# Patient Record
Sex: MALE | Race: WHITE | NOT HISPANIC OR LATINO | Employment: OTHER | URBAN - METROPOLITAN AREA
[De-identification: names, ages, dates, MRNs, and addresses within clinical notes are randomized per-mention and may not be internally consistent; named-entity substitution may affect disease eponyms.]

---

## 2017-01-19 ENCOUNTER — COMMUNICATION - HEALTHEAST (OUTPATIENT)
Dept: SCHEDULING | Facility: CLINIC | Age: 77
End: 2017-01-19

## 2017-01-19 DIAGNOSIS — I10 HYPERTENSION: ICD-10-CM

## 2017-02-28 ENCOUNTER — OFFICE VISIT - HEALTHEAST (OUTPATIENT)
Dept: VASCULAR SURGERY | Facility: CLINIC | Age: 77
End: 2017-02-28

## 2017-02-28 DIAGNOSIS — Z72.0 TOBACCO ABUSE: ICD-10-CM

## 2017-02-28 DIAGNOSIS — I10 UNSPECIFIED ESSENTIAL HYPERTENSION: ICD-10-CM

## 2017-02-28 DIAGNOSIS — I73.9 PAD (PERIPHERAL ARTERY DISEASE) (H): ICD-10-CM

## 2017-02-28 DIAGNOSIS — I65.23 CAROTID STENOSIS, BILATERAL: ICD-10-CM

## 2017-02-28 DIAGNOSIS — E78.5 HYPERLIPIDEMIA: ICD-10-CM

## 2017-02-28 ASSESSMENT — MIFFLIN-ST. JEOR: SCORE: 1372.63

## 2017-04-06 ENCOUNTER — OFFICE VISIT - HEALTHEAST (OUTPATIENT)
Dept: INTERNAL MEDICINE | Facility: CLINIC | Age: 77
End: 2017-04-06

## 2017-04-06 DIAGNOSIS — N18.9 CHRONIC KIDNEY DISEASE, UNSPECIFIED: ICD-10-CM

## 2017-04-06 DIAGNOSIS — E87.1 HYPONATREMIA: ICD-10-CM

## 2017-04-06 DIAGNOSIS — R01.0 INNOCENT HEART MURMUR: ICD-10-CM

## 2017-04-06 DIAGNOSIS — I10 ESSENTIAL HYPERTENSION WITH GOAL BLOOD PRESSURE LESS THAN 140/90: ICD-10-CM

## 2017-04-06 ASSESSMENT — MIFFLIN-ST. JEOR: SCORE: 1408.92

## 2017-06-12 ENCOUNTER — COMMUNICATION - HEALTHEAST (OUTPATIENT)
Dept: INTERNAL MEDICINE | Facility: CLINIC | Age: 77
End: 2017-06-12

## 2017-06-12 DIAGNOSIS — I10 HYPERTENSION: ICD-10-CM

## 2017-06-27 ENCOUNTER — OFFICE VISIT - HEALTHEAST (OUTPATIENT)
Dept: INTERNAL MEDICINE | Facility: CLINIC | Age: 77
End: 2017-06-27

## 2017-06-27 DIAGNOSIS — L02.31 CUTANEOUS ABSCESS OF BUTTOCK: ICD-10-CM

## 2017-06-27 ASSESSMENT — MIFFLIN-ST. JEOR: SCORE: 1419.35

## 2017-07-03 ENCOUNTER — RECORDS - HEALTHEAST (OUTPATIENT)
Dept: ADMINISTRATIVE | Facility: OTHER | Age: 77
End: 2017-07-03

## 2017-07-24 ENCOUNTER — COMMUNICATION - HEALTHEAST (OUTPATIENT)
Dept: INTERNAL MEDICINE | Facility: CLINIC | Age: 77
End: 2017-07-24

## 2017-07-24 ENCOUNTER — OFFICE VISIT - HEALTHEAST (OUTPATIENT)
Dept: INTERNAL MEDICINE | Facility: CLINIC | Age: 77
End: 2017-07-24

## 2017-07-24 DIAGNOSIS — Z01.818 PREOP EXAM FOR INTERNAL MEDICINE: ICD-10-CM

## 2017-07-24 DIAGNOSIS — E87.1 HYPONATREMIA: ICD-10-CM

## 2017-07-24 DIAGNOSIS — I10 ESSENTIAL HYPERTENSION WITH GOAL BLOOD PRESSURE LESS THAN 140/90: ICD-10-CM

## 2017-07-24 DIAGNOSIS — L02.31 ABSCESS OF BUTTOCK: ICD-10-CM

## 2017-07-24 LAB
ATRIAL RATE - MUSE: 53 BPM
DIASTOLIC BLOOD PRESSURE - MUSE: NORMAL MMHG
INTERPRETATION ECG - MUSE: NORMAL
P AXIS - MUSE: 74 DEGREES
PR INTERVAL - MUSE: 250 MS
QRS DURATION - MUSE: 90 MS
QT - MUSE: 438 MS
QTC - MUSE: 410 MS
R AXIS - MUSE: 48 DEGREES
SYSTOLIC BLOOD PRESSURE - MUSE: NORMAL MMHG
T AXIS - MUSE: 84 DEGREES
VENTRICULAR RATE- MUSE: 53 BPM

## 2017-07-24 ASSESSMENT — MIFFLIN-ST. JEOR: SCORE: 1400.41

## 2017-07-27 ENCOUNTER — SURGERY - HEALTHEAST (OUTPATIENT)
Dept: SURGERY | Facility: CLINIC | Age: 77
End: 2017-07-27

## 2017-07-27 ENCOUNTER — ANESTHESIA - HEALTHEAST (OUTPATIENT)
Dept: SURGERY | Facility: CLINIC | Age: 77
End: 2017-07-27

## 2017-07-27 ASSESSMENT — MIFFLIN-ST. JEOR: SCORE: 1381.7

## 2017-08-20 ENCOUNTER — COMMUNICATION - HEALTHEAST (OUTPATIENT)
Dept: INTERNAL MEDICINE | Facility: CLINIC | Age: 77
End: 2017-08-20

## 2017-08-20 DIAGNOSIS — F32.A DEPRESSION: ICD-10-CM

## 2017-08-27 ENCOUNTER — OFFICE VISIT - HEALTHEAST (OUTPATIENT)
Dept: FAMILY MEDICINE | Facility: CLINIC | Age: 77
End: 2017-08-27

## 2017-08-27 ENCOUNTER — RECORDS - HEALTHEAST (OUTPATIENT)
Dept: GENERAL RADIOLOGY | Facility: CLINIC | Age: 77
End: 2017-08-27

## 2017-08-27 DIAGNOSIS — R06.02 SOB (SHORTNESS OF BREATH): ICD-10-CM

## 2017-08-27 DIAGNOSIS — R06.02 SHORTNESS OF BREATH: ICD-10-CM

## 2017-08-27 DIAGNOSIS — R05.9 COUGH: ICD-10-CM

## 2017-08-27 DIAGNOSIS — J20.9 ACUTE BRONCHITIS: ICD-10-CM

## 2017-09-05 ENCOUNTER — COMMUNICATION - HEALTHEAST (OUTPATIENT)
Dept: INTERNAL MEDICINE | Facility: CLINIC | Age: 77
End: 2017-09-05

## 2017-09-05 DIAGNOSIS — E78.5 HYPERLIPIDEMIA: ICD-10-CM

## 2017-09-09 ENCOUNTER — COMMUNICATION - HEALTHEAST (OUTPATIENT)
Dept: INTERNAL MEDICINE | Facility: CLINIC | Age: 77
End: 2017-09-09

## 2017-09-09 DIAGNOSIS — N40.0 BPH (BENIGN PROSTATIC HYPERPLASIA): ICD-10-CM

## 2017-09-11 ENCOUNTER — AMBULATORY - HEALTHEAST (OUTPATIENT)
Dept: INTERNAL MEDICINE | Facility: CLINIC | Age: 77
End: 2017-09-11

## 2017-09-12 ENCOUNTER — OFFICE VISIT - HEALTHEAST (OUTPATIENT)
Dept: INTERNAL MEDICINE | Facility: CLINIC | Age: 77
End: 2017-09-12

## 2017-09-12 ENCOUNTER — COMMUNICATION - HEALTHEAST (OUTPATIENT)
Dept: INTERNAL MEDICINE | Facility: CLINIC | Age: 77
End: 2017-09-12

## 2017-09-12 DIAGNOSIS — I10 ESSENTIAL HYPERTENSION WITH GOAL BLOOD PRESSURE LESS THAN 140/90: ICD-10-CM

## 2017-09-12 DIAGNOSIS — J40 BRONCHITIS: ICD-10-CM

## 2017-09-12 DIAGNOSIS — J31.0 RHINITIS: ICD-10-CM

## 2017-09-12 ASSESSMENT — MIFFLIN-ST. JEOR: SCORE: 1390.78

## 2017-10-14 ENCOUNTER — COMMUNICATION - HEALTHEAST (OUTPATIENT)
Dept: INTERNAL MEDICINE | Facility: CLINIC | Age: 77
End: 2017-10-14

## 2017-10-14 DIAGNOSIS — I10 HYPERTENSION: ICD-10-CM

## 2017-10-14 DIAGNOSIS — F32.A DEPRESSION: ICD-10-CM

## 2017-10-19 ENCOUNTER — COMMUNICATION - HEALTHEAST (OUTPATIENT)
Dept: INTERNAL MEDICINE | Facility: CLINIC | Age: 77
End: 2017-10-19

## 2017-10-19 DIAGNOSIS — I10 HYPERTENSION: ICD-10-CM

## 2017-10-25 ENCOUNTER — COMMUNICATION - HEALTHEAST (OUTPATIENT)
Dept: INTERNAL MEDICINE | Facility: CLINIC | Age: 77
End: 2017-10-25

## 2017-10-25 DIAGNOSIS — I10 ESSENTIAL HYPERTENSION: ICD-10-CM

## 2018-04-23 ENCOUNTER — COMMUNICATION - HEALTHEAST (OUTPATIENT)
Dept: INTERNAL MEDICINE | Facility: CLINIC | Age: 78
End: 2018-04-23

## 2018-04-23 DIAGNOSIS — I10 HYPERTENSION: ICD-10-CM

## 2018-04-24 ENCOUNTER — AMBULATORY - HEALTHEAST (OUTPATIENT)
Dept: INTERNAL MEDICINE | Facility: CLINIC | Age: 78
End: 2018-04-24

## 2018-04-24 ENCOUNTER — COMMUNICATION - HEALTHEAST (OUTPATIENT)
Dept: INTERNAL MEDICINE | Facility: CLINIC | Age: 78
End: 2018-04-24

## 2018-05-10 ENCOUNTER — OFFICE VISIT - HEALTHEAST (OUTPATIENT)
Dept: INTERNAL MEDICINE | Facility: CLINIC | Age: 78
End: 2018-05-10

## 2018-05-10 ENCOUNTER — COMMUNICATION - HEALTHEAST (OUTPATIENT)
Dept: INTERNAL MEDICINE | Facility: CLINIC | Age: 78
End: 2018-05-10

## 2018-05-10 DIAGNOSIS — E55.9 VITAMIN D DEFICIENCY: ICD-10-CM

## 2018-05-10 DIAGNOSIS — I73.9 PERIPHERAL VASCULAR DISEASE (H): ICD-10-CM

## 2018-05-10 DIAGNOSIS — N18.9 CHRONIC KIDNEY DISEASE, UNSPECIFIED CKD STAGE: ICD-10-CM

## 2018-05-10 DIAGNOSIS — K63.5 COLON POLYP: ICD-10-CM

## 2018-05-10 DIAGNOSIS — E78.49 OTHER HYPERLIPIDEMIA: ICD-10-CM

## 2018-05-10 DIAGNOSIS — E72.10 DISORDER OF SULFUR-BEARING AMINO ACID METABOLISM (H): ICD-10-CM

## 2018-05-10 DIAGNOSIS — I10 ESSENTIAL HYPERTENSION: ICD-10-CM

## 2018-05-10 DIAGNOSIS — J43.8 OTHER EMPHYSEMA (H): ICD-10-CM

## 2018-05-10 LAB
ALBUMIN SERPL-MCNC: 3.2 G/DL (ref 3.5–5)
ALP SERPL-CCNC: 71 U/L (ref 45–120)
ALT SERPL W P-5'-P-CCNC: <9 U/L (ref 0–45)
ANION GAP SERPL CALCULATED.3IONS-SCNC: 8 MMOL/L (ref 5–18)
AST SERPL W P-5'-P-CCNC: 11 U/L (ref 0–40)
BILIRUB DIRECT SERPL-MCNC: 0.1 MG/DL
BILIRUB SERPL-MCNC: 0.3 MG/DL (ref 0–1)
BUN SERPL-MCNC: 25 MG/DL (ref 8–28)
CALCIUM SERPL-MCNC: 9.3 MG/DL (ref 8.5–10.5)
CHLORIDE BLD-SCNC: 102 MMOL/L (ref 98–107)
CHOLEST SERPL-MCNC: 108 MG/DL
CO2 SERPL-SCNC: 25 MMOL/L (ref 22–31)
CREAT SERPL-MCNC: 1.48 MG/DL (ref 0.7–1.3)
ERYTHROCYTE [DISTWIDTH] IN BLOOD BY AUTOMATED COUNT: 12 % (ref 11–14.5)
FASTING STATUS PATIENT QL REPORTED: YES
FASTING STATUS PATIENT QL REPORTED: YES
GFR SERPL CREATININE-BSD FRML MDRD: 46 ML/MIN/1.73M2
GLUCOSE BLD-MCNC: 98 MG/DL (ref 70–125)
HCT VFR BLD AUTO: 37.5 % (ref 40–54)
HDLC SERPL-MCNC: 31 MG/DL
HGB BLD-MCNC: 12.8 G/DL (ref 14–18)
HOMOCYSTEINE PLASMA - HISTORICAL: 16 UMOL/L (ref 0–13)
LDLC SERPL CALC-MCNC: 63 MG/DL
MCH RBC QN AUTO: 30.1 PG (ref 27–34)
MCHC RBC AUTO-ENTMCNC: 34 G/DL (ref 32–36)
MCV RBC AUTO: 88 FL (ref 80–100)
PLATELET # BLD AUTO: 271 THOU/UL (ref 140–440)
PMV BLD AUTO: 7.3 FL (ref 7–10)
POTASSIUM BLD-SCNC: 5.5 MMOL/L (ref 3.5–5)
PROT SERPL-MCNC: 6.2 G/DL (ref 6–8)
RBC # BLD AUTO: 4.24 MILL/UL (ref 4.4–6.2)
SODIUM SERPL-SCNC: 135 MMOL/L (ref 136–145)
TRIGL SERPL-MCNC: 70 MG/DL
TSH SERPL DL<=0.005 MIU/L-ACNC: 1.27 UIU/ML (ref 0.3–5)
WBC: 7.2 THOU/UL (ref 4–11)

## 2018-05-11 ENCOUNTER — COMMUNICATION - HEALTHEAST (OUTPATIENT)
Dept: INTERNAL MEDICINE | Facility: CLINIC | Age: 78
End: 2018-05-11

## 2018-05-11 LAB — 25(OH)D3 SERPL-MCNC: 55.7 NG/ML (ref 30–80)

## 2018-05-13 ENCOUNTER — AMBULATORY - HEALTHEAST (OUTPATIENT)
Dept: INTERNAL MEDICINE | Facility: CLINIC | Age: 78
End: 2018-05-13

## 2018-05-15 ENCOUNTER — COMMUNICATION - HEALTHEAST (OUTPATIENT)
Dept: INTERNAL MEDICINE | Facility: CLINIC | Age: 78
End: 2018-05-15

## 2018-05-15 DIAGNOSIS — F32.A DEPRESSION: ICD-10-CM

## 2018-05-18 ENCOUNTER — COMMUNICATION - HEALTHEAST (OUTPATIENT)
Dept: INTERNAL MEDICINE | Facility: CLINIC | Age: 78
End: 2018-05-18

## 2018-05-18 DIAGNOSIS — J44.9 COPD (CHRONIC OBSTRUCTIVE PULMONARY DISEASE) (H): ICD-10-CM

## 2018-05-26 ENCOUNTER — COMMUNICATION - HEALTHEAST (OUTPATIENT)
Dept: INTERNAL MEDICINE | Facility: CLINIC | Age: 78
End: 2018-05-26

## 2018-06-05 ENCOUNTER — COMMUNICATION - HEALTHEAST (OUTPATIENT)
Dept: INTERNAL MEDICINE | Facility: CLINIC | Age: 78
End: 2018-06-05

## 2018-06-12 ENCOUNTER — COMMUNICATION - HEALTHEAST (OUTPATIENT)
Dept: INTERNAL MEDICINE | Facility: CLINIC | Age: 78
End: 2018-06-12

## 2018-06-12 DIAGNOSIS — N40.0 BPH (BENIGN PROSTATIC HYPERPLASIA): ICD-10-CM

## 2018-06-20 ENCOUNTER — COMMUNICATION - HEALTHEAST (OUTPATIENT)
Dept: INTERNAL MEDICINE | Facility: CLINIC | Age: 78
End: 2018-06-20

## 2018-06-20 DIAGNOSIS — I10 HYPERTENSION: ICD-10-CM

## 2018-06-25 ENCOUNTER — OFFICE VISIT - HEALTHEAST (OUTPATIENT)
Dept: INTERNAL MEDICINE | Facility: CLINIC | Age: 78
End: 2018-06-25

## 2018-06-25 DIAGNOSIS — I10 ESSENTIAL HYPERTENSION: ICD-10-CM

## 2018-06-25 DIAGNOSIS — F32.A DEPRESSION: ICD-10-CM

## 2018-10-30 ENCOUNTER — RECORDS - HEALTHEAST (OUTPATIENT)
Dept: ADMINISTRATIVE | Facility: OTHER | Age: 78
End: 2018-10-30

## 2018-10-31 ENCOUNTER — RECORDS - HEALTHEAST (OUTPATIENT)
Dept: ADMINISTRATIVE | Facility: OTHER | Age: 78
End: 2018-10-31

## 2018-10-31 ENCOUNTER — COMMUNICATION - HEALTHEAST (OUTPATIENT)
Dept: INTERNAL MEDICINE | Facility: CLINIC | Age: 78
End: 2018-10-31

## 2018-10-31 DIAGNOSIS — I10 HYPERTENSION: ICD-10-CM

## 2018-11-14 ENCOUNTER — COMMUNICATION - HEALTHEAST (OUTPATIENT)
Dept: INTERNAL MEDICINE | Facility: CLINIC | Age: 78
End: 2018-11-14

## 2018-11-14 DIAGNOSIS — I10 HYPERTENSION: ICD-10-CM

## 2018-12-09 ENCOUNTER — COMMUNICATION - HEALTHEAST (OUTPATIENT)
Dept: INTERNAL MEDICINE | Facility: CLINIC | Age: 78
End: 2018-12-09

## 2018-12-09 DIAGNOSIS — E78.5 HYPERLIPIDEMIA: ICD-10-CM

## 2019-02-13 ENCOUNTER — OFFICE VISIT - HEALTHEAST (OUTPATIENT)
Dept: INTERNAL MEDICINE | Facility: CLINIC | Age: 79
End: 2019-02-13

## 2019-02-13 DIAGNOSIS — J43.9 PULMONARY EMPHYSEMA, UNSPECIFIED EMPHYSEMA TYPE (H): ICD-10-CM

## 2019-02-13 DIAGNOSIS — E78.5 HYPERLIPIDEMIA: ICD-10-CM

## 2019-02-13 DIAGNOSIS — Z51.81 MEDICATION MONITORING ENCOUNTER: ICD-10-CM

## 2019-02-13 DIAGNOSIS — N40.0 BPH (BENIGN PROSTATIC HYPERPLASIA): ICD-10-CM

## 2019-02-13 DIAGNOSIS — Z87.891 HISTORY OF TOBACCO USE: ICD-10-CM

## 2019-02-13 DIAGNOSIS — Z12.5 SCREENING FOR PROSTATE CANCER: ICD-10-CM

## 2019-02-13 DIAGNOSIS — F32.A DEPRESSION: ICD-10-CM

## 2019-02-13 DIAGNOSIS — I73.9 PERIPHERAL VASCULAR DISEASE (H): ICD-10-CM

## 2019-02-13 DIAGNOSIS — N18.9 CHRONIC KIDNEY DISEASE, UNSPECIFIED CKD STAGE: ICD-10-CM

## 2019-02-13 DIAGNOSIS — I10 HYPERTENSION: ICD-10-CM

## 2019-02-13 DIAGNOSIS — Z86.0100 HISTORY OF COLONIC POLYPS: ICD-10-CM

## 2019-02-13 DIAGNOSIS — Z86.73 HISTORY OF STROKE: ICD-10-CM

## 2019-02-13 LAB
ALBUMIN SERPL-MCNC: 3.5 G/DL (ref 3.5–5)
ALP SERPL-CCNC: 75 U/L (ref 45–120)
ALT SERPL W P-5'-P-CCNC: <9 U/L (ref 0–45)
ANION GAP SERPL CALCULATED.3IONS-SCNC: 11 MMOL/L (ref 5–18)
AST SERPL W P-5'-P-CCNC: 15 U/L (ref 0–40)
BILIRUB SERPL-MCNC: 0.3 MG/DL (ref 0–1)
BUN SERPL-MCNC: 22 MG/DL (ref 8–28)
CALCIUM SERPL-MCNC: 9.2 MG/DL (ref 8.5–10.5)
CHLORIDE BLD-SCNC: 96 MMOL/L (ref 98–107)
CO2 SERPL-SCNC: 26 MMOL/L (ref 22–31)
CREAT SERPL-MCNC: 1.38 MG/DL (ref 0.7–1.3)
ERYTHROCYTE [DISTWIDTH] IN BLOOD BY AUTOMATED COUNT: 12.6 % (ref 11–14.5)
GFR SERPL CREATININE-BSD FRML MDRD: 50 ML/MIN/1.73M2
GLUCOSE BLD-MCNC: 102 MG/DL (ref 70–125)
HCT VFR BLD AUTO: 40.9 % (ref 40–54)
HGB BLD-MCNC: 13.6 G/DL (ref 14–18)
MCH RBC QN AUTO: 29.7 PG (ref 27–34)
MCHC RBC AUTO-ENTMCNC: 33.3 G/DL (ref 32–36)
MCV RBC AUTO: 89 FL (ref 80–100)
PLATELET # BLD AUTO: 286 THOU/UL (ref 140–440)
PMV BLD AUTO: 7.2 FL (ref 7–10)
POTASSIUM BLD-SCNC: 4.9 MMOL/L (ref 3.5–5)
PROT SERPL-MCNC: 6.3 G/DL (ref 6–8)
PSA SERPL-MCNC: 0.6 NG/ML (ref 0–6.5)
RBC # BLD AUTO: 4.58 MILL/UL (ref 4.4–6.2)
SODIUM SERPL-SCNC: 133 MMOL/L (ref 136–145)
WBC: 8.3 THOU/UL (ref 4–11)

## 2019-02-14 ENCOUNTER — COMMUNICATION - HEALTHEAST (OUTPATIENT)
Dept: INTERNAL MEDICINE | Facility: CLINIC | Age: 79
End: 2019-02-14

## 2019-02-14 ENCOUNTER — HOSPITAL ENCOUNTER (OUTPATIENT)
Dept: CT IMAGING | Facility: HOSPITAL | Age: 79
Discharge: HOME | End: 2019-02-14
Attending: INTERNAL MEDICINE

## 2019-02-14 DIAGNOSIS — Z87.891 HISTORY OF TOBACCO USE: ICD-10-CM

## 2019-02-18 ENCOUNTER — COMMUNICATION - HEALTHEAST (OUTPATIENT)
Dept: INTERNAL MEDICINE | Facility: CLINIC | Age: 79
End: 2019-02-18

## 2019-02-18 DIAGNOSIS — F32.A DEPRESSION: ICD-10-CM

## 2019-02-18 DIAGNOSIS — I10 HYPERTENSION: ICD-10-CM

## 2019-03-11 ENCOUNTER — COMMUNICATION - HEALTHEAST (OUTPATIENT)
Dept: INTERNAL MEDICINE | Facility: CLINIC | Age: 79
End: 2019-03-11

## 2019-03-11 DIAGNOSIS — N40.0 BPH (BENIGN PROSTATIC HYPERPLASIA): ICD-10-CM

## 2019-03-14 ENCOUNTER — COMMUNICATION - HEALTHEAST (OUTPATIENT)
Dept: SCHEDULING | Facility: CLINIC | Age: 79
End: 2019-03-14

## 2019-03-14 DIAGNOSIS — N40.0 BPH (BENIGN PROSTATIC HYPERPLASIA): ICD-10-CM

## 2019-05-15 ENCOUNTER — OFFICE VISIT - HEALTHEAST (OUTPATIENT)
Dept: INTERNAL MEDICINE | Facility: CLINIC | Age: 79
End: 2019-05-15

## 2019-05-15 DIAGNOSIS — I73.9 PERIPHERAL VASCULAR DISEASE (H): ICD-10-CM

## 2019-05-15 DIAGNOSIS — I65.29 STENOSIS OF CAROTID ARTERY, UNSPECIFIED LATERALITY: ICD-10-CM

## 2019-05-15 DIAGNOSIS — Z86.73 HISTORY OF STROKE: ICD-10-CM

## 2019-05-15 DIAGNOSIS — J43.9 PULMONARY EMPHYSEMA, UNSPECIFIED EMPHYSEMA TYPE (H): ICD-10-CM

## 2019-05-15 DIAGNOSIS — Z87.891 HISTORY OF TOBACCO USE: ICD-10-CM

## 2019-05-15 DIAGNOSIS — Z51.81 MEDICATION MONITORING ENCOUNTER: ICD-10-CM

## 2019-05-15 DIAGNOSIS — N40.0 BENIGN PROSTATIC HYPERPLASIA, UNSPECIFIED WHETHER LOWER URINARY TRACT SYMPTOMS PRESENT: ICD-10-CM

## 2019-05-15 DIAGNOSIS — I10 ESSENTIAL HYPERTENSION: ICD-10-CM

## 2019-05-15 LAB
ALBUMIN SERPL-MCNC: 3.1 G/DL (ref 3.5–5)
ALP SERPL-CCNC: 67 U/L (ref 45–120)
ALT SERPL W P-5'-P-CCNC: <9 U/L (ref 0–45)
ANION GAP SERPL CALCULATED.3IONS-SCNC: 9 MMOL/L (ref 5–18)
AST SERPL W P-5'-P-CCNC: 13 U/L (ref 0–40)
BILIRUB SERPL-MCNC: 0.2 MG/DL (ref 0–1)
BUN SERPL-MCNC: 22 MG/DL (ref 8–28)
CALCIUM SERPL-MCNC: 9.1 MG/DL (ref 8.5–10.5)
CHLORIDE BLD-SCNC: 97 MMOL/L (ref 98–107)
CO2 SERPL-SCNC: 25 MMOL/L (ref 22–31)
CREAT SERPL-MCNC: 1.39 MG/DL (ref 0.7–1.3)
GFR SERPL CREATININE-BSD FRML MDRD: 50 ML/MIN/1.73M2
GLUCOSE BLD-MCNC: 102 MG/DL (ref 70–125)
POTASSIUM BLD-SCNC: 5.1 MMOL/L (ref 3.5–5)
PROT SERPL-MCNC: 5.7 G/DL (ref 6–8)
SODIUM SERPL-SCNC: 131 MMOL/L (ref 136–145)

## 2019-05-16 ENCOUNTER — COMMUNICATION - HEALTHEAST (OUTPATIENT)
Dept: INTERNAL MEDICINE | Facility: CLINIC | Age: 79
End: 2019-05-16

## 2019-05-16 DIAGNOSIS — E87.1 HYPONATREMIA: ICD-10-CM

## 2019-05-19 ENCOUNTER — COMMUNICATION - HEALTHEAST (OUTPATIENT)
Dept: INTERNAL MEDICINE | Facility: CLINIC | Age: 79
End: 2019-05-19

## 2019-05-19 DIAGNOSIS — I10 HYPERTENSION: ICD-10-CM

## 2019-05-28 ENCOUNTER — AMBULATORY - HEALTHEAST (OUTPATIENT)
Dept: LAB | Facility: CLINIC | Age: 79
End: 2019-05-28

## 2019-05-28 DIAGNOSIS — E87.1 HYPONATREMIA: ICD-10-CM

## 2019-05-28 LAB
ANION GAP SERPL CALCULATED.3IONS-SCNC: 11 MMOL/L (ref 5–18)
BUN SERPL-MCNC: 20 MG/DL (ref 8–28)
CALCIUM SERPL-MCNC: 9.6 MG/DL (ref 8.5–10.5)
CHLORIDE BLD-SCNC: 97 MMOL/L (ref 98–107)
CO2 SERPL-SCNC: 24 MMOL/L (ref 22–31)
CREAT SERPL-MCNC: 1.38 MG/DL (ref 0.7–1.3)
GFR SERPL CREATININE-BSD FRML MDRD: 50 ML/MIN/1.73M2
GLUCOSE BLD-MCNC: 105 MG/DL (ref 70–125)
POTASSIUM BLD-SCNC: 5 MMOL/L (ref 3.5–5)
SODIUM SERPL-SCNC: 132 MMOL/L (ref 136–145)

## 2019-05-29 ENCOUNTER — COMMUNICATION - HEALTHEAST (OUTPATIENT)
Dept: INTERNAL MEDICINE | Facility: CLINIC | Age: 79
End: 2019-05-29

## 2019-07-10 ENCOUNTER — TRANSFERRED RECORDS (OUTPATIENT)
Dept: HEALTH INFORMATION MANAGEMENT | Facility: CLINIC | Age: 79
End: 2019-07-10

## 2019-07-12 ENCOUNTER — PRE VISIT (OUTPATIENT)
Dept: OPHTHALMOLOGY | Facility: CLINIC | Age: 79
End: 2019-07-12

## 2019-07-12 NOTE — TELEPHONE ENCOUNTER
FUTURE VISIT INFORMATION      FUTURE VISIT INFORMATION:    Date: 8/12/19    Time: 8:00am    Location: [facility]  REFERRAL INFORMATION:    Referring provider:   Dr. Mando Avila     Referring providers clinic:  [facility]    Reason for visit/diagnosis  Right eye enucleation    RECORDS REQUESTED FROM:       Clinic name Comments Records Status Imaging Status   [facility] Records received and sent to [facility]

## 2019-08-07 ENCOUNTER — OFFICE VISIT - HEALTHEAST (OUTPATIENT)
Dept: INTERNAL MEDICINE | Facility: CLINIC | Age: 79
End: 2019-08-07

## 2019-08-07 ENCOUNTER — HOSPITAL ENCOUNTER (OUTPATIENT)
Dept: ULTRASOUND IMAGING | Facility: CLINIC | Age: 79
Discharge: HOME | End: 2019-08-07
Attending: INTERNAL MEDICINE

## 2019-08-07 DIAGNOSIS — E78.00 HYPERCHOLESTEROLEMIA: ICD-10-CM

## 2019-08-07 DIAGNOSIS — H54.40 BLIND RIGHT EYE: ICD-10-CM

## 2019-08-07 DIAGNOSIS — J44.9 COPD (CHRONIC OBSTRUCTIVE PULMONARY DISEASE) (H): ICD-10-CM

## 2019-08-07 DIAGNOSIS — Z86.73 HISTORY OF STROKE: ICD-10-CM

## 2019-08-07 DIAGNOSIS — Z51.81 ENCOUNTER FOR THERAPEUTIC DRUG MONITORING: ICD-10-CM

## 2019-08-07 DIAGNOSIS — I73.9 PERIPHERAL VASCULAR DISEASE (H): ICD-10-CM

## 2019-08-07 DIAGNOSIS — I10 ESSENTIAL HYPERTENSION: ICD-10-CM

## 2019-08-07 DIAGNOSIS — N18.9 CHRONIC KIDNEY DISEASE, UNSPECIFIED CKD STAGE: ICD-10-CM

## 2019-08-07 DIAGNOSIS — I65.29 STENOSIS OF CAROTID ARTERY, UNSPECIFIED LATERALITY: ICD-10-CM

## 2019-08-07 DIAGNOSIS — Z86.0100 HISTORY OF COLONIC POLYPS: ICD-10-CM

## 2019-08-07 DIAGNOSIS — I35.0 AORTIC VALVE STENOSIS, ETIOLOGY OF CARDIAC VALVE DISEASE UNSPECIFIED: ICD-10-CM

## 2019-08-07 DIAGNOSIS — J43.9 PULMONARY EMPHYSEMA, UNSPECIFIED EMPHYSEMA TYPE (H): ICD-10-CM

## 2019-08-07 DIAGNOSIS — Z87.891 HISTORY OF TOBACCO USE: ICD-10-CM

## 2019-08-07 LAB
ALBUMIN SERPL-MCNC: 3.7 G/DL (ref 3.5–5)
ALP SERPL-CCNC: 69 U/L (ref 45–120)
ALT SERPL W P-5'-P-CCNC: 12 U/L (ref 0–45)
ANION GAP SERPL CALCULATED.3IONS-SCNC: 8 MMOL/L (ref 5–18)
AST SERPL W P-5'-P-CCNC: 16 U/L (ref 0–40)
BILIRUB SERPL-MCNC: 0.5 MG/DL (ref 0–1)
BUN SERPL-MCNC: 21 MG/DL (ref 8–28)
CALCIUM SERPL-MCNC: 9.4 MG/DL (ref 8.5–10.5)
CHLORIDE BLD-SCNC: 98 MMOL/L (ref 98–107)
CHOLEST SERPL-MCNC: 95 MG/DL
CK SERPL-CCNC: 120 U/L (ref 30–190)
CO2 SERPL-SCNC: 23 MMOL/L (ref 22–31)
CREAT SERPL-MCNC: 1.47 MG/DL (ref 0.7–1.3)
ERYTHROCYTE [DISTWIDTH] IN BLOOD BY AUTOMATED COUNT: 12.9 % (ref 11–14.5)
FASTING STATUS PATIENT QL REPORTED: YES
GFR SERPL CREATININE-BSD FRML MDRD: 47 ML/MIN/1.73M2
GLUCOSE BLD-MCNC: 92 MG/DL (ref 70–125)
HCT VFR BLD AUTO: 37.6 % (ref 40–54)
HDLC SERPL-MCNC: 33 MG/DL
HGB BLD-MCNC: 12.6 G/DL (ref 14–18)
LDLC SERPL CALC-MCNC: 46 MG/DL
MCH RBC QN AUTO: 30.1 PG (ref 27–34)
MCHC RBC AUTO-ENTMCNC: 33.6 G/DL (ref 32–36)
MCV RBC AUTO: 90 FL (ref 80–100)
PLATELET # BLD AUTO: 250 THOU/UL (ref 140–440)
PMV BLD AUTO: 8.1 FL (ref 7–10)
POTASSIUM BLD-SCNC: 5.7 MMOL/L (ref 3.5–5)
PROT SERPL-MCNC: 5.9 G/DL (ref 6–8)
RBC # BLD AUTO: 4.2 MILL/UL (ref 4.4–6.2)
SODIUM SERPL-SCNC: 129 MMOL/L (ref 136–145)
TRIGL SERPL-MCNC: 81 MG/DL
WBC: 9.2 THOU/UL (ref 4–11)

## 2019-08-08 ENCOUNTER — COMMUNICATION - HEALTHEAST (OUTPATIENT)
Dept: INTERNAL MEDICINE | Facility: CLINIC | Age: 79
End: 2019-08-08

## 2019-08-12 ENCOUNTER — ALLIED HEALTH/NURSE VISIT (OUTPATIENT)
Dept: OPHTHALMOLOGY | Facility: CLINIC | Age: 79
End: 2019-08-12
Attending: OPHTHALMOLOGY
Payer: COMMERCIAL

## 2019-08-12 ENCOUNTER — OFFICE VISIT (OUTPATIENT)
Dept: OPHTHALMOLOGY | Facility: CLINIC | Age: 79
End: 2019-08-12
Payer: COMMERCIAL

## 2019-08-12 VITALS — WEIGHT: 145 LBS | BODY MASS INDEX: 21.48 KG/M2 | HEIGHT: 69 IN

## 2019-08-12 DIAGNOSIS — H57.10 BLIND PAINFUL EYE: Primary | ICD-10-CM

## 2019-08-12 DIAGNOSIS — H54.40 BLIND PAINFUL EYE: Primary | ICD-10-CM

## 2019-08-12 DIAGNOSIS — H18.421 BAND KERATOPATHY OF RIGHT EYE: ICD-10-CM

## 2019-08-12 RX ORDER — CLOPIDOGREL BISULFATE 75 MG/1
TABLET ORAL
COMMUNITY
Start: 2019-07-10 | End: 2020-07-27

## 2019-08-12 RX ORDER — LOSARTAN POTASSIUM 100 MG/1
TABLET ORAL
COMMUNITY
Start: 2019-07-10 | End: 2021-01-01

## 2019-08-12 RX ORDER — SIMVASTATIN 40 MG
TABLET ORAL
COMMUNITY
Start: 2019-03-11 | End: 2021-01-01

## 2019-08-12 RX ORDER — NEOMYCIN SULFATE, POLYMYXIN B SULFATE AND DEXAMETHASONE 3.5; 10000; 1 MG/ML; [USP'U]/ML; MG/ML
SUSPENSION/ DROPS OPHTHALMIC
COMMUNITY
Start: 2019-07-25 | End: 2021-01-01

## 2019-08-12 RX ORDER — TAMSULOSIN HYDROCHLORIDE 0.4 MG/1
0.4 CAPSULE ORAL DAILY
COMMUNITY
Start: 2019-06-10

## 2019-08-12 RX ORDER — METOPROLOL SUCCINATE 100 MG/1
TABLET, EXTENDED RELEASE ORAL
COMMUNITY
Start: 2019-05-16 | End: 2021-01-01

## 2019-08-12 RX ORDER — MIRTAZAPINE 30 MG/1
30 TABLET, FILM COATED ORAL AT BEDTIME
COMMUNITY
Start: 2019-05-17

## 2019-08-12 RX ORDER — AMLODIPINE BESYLATE 5 MG/1
5 TABLET ORAL DAILY
COMMUNITY
Start: 2019-05-16 | End: 2021-01-01

## 2019-08-12 ASSESSMENT — CONF VISUAL FIELD
OD_SUPERIOR_TEMPORAL_RESTRICTION: 1
OD_INFERIOR_NASAL_RESTRICTION: 1
OD_SUPERIOR_NASAL_RESTRICTION: 1
OD_INFERIOR_TEMPORAL_RESTRICTION: 1
OS_NORMAL: 1
COMMENTS: CHECKED WITH HM

## 2019-08-12 ASSESSMENT — MIFFLIN-ST. JEOR: SCORE: 1370.16

## 2019-08-12 ASSESSMENT — EXTERNAL EXAM - RIGHT EYE: OD_EXAM: NORMAL

## 2019-08-12 ASSESSMENT — VISUAL ACUITY
OD_CC: NLP
OS_CC: 20/30
METHOD: SNELLEN - LINEAR
OS_CC+: -1

## 2019-08-12 ASSESSMENT — SLIT LAMP EXAM - LIDS
COMMENTS: DERMATOCHALASIS
COMMENTS: DERMATOCHALASIS

## 2019-08-12 ASSESSMENT — TONOMETRY
OS_IOP_MMHG: 06
IOP_METHOD: ICARE
OD_IOP_MMHG: 04

## 2019-08-12 ASSESSMENT — EXTERNAL EXAM - LEFT EYE: OS_EXAM: NORMAL

## 2019-08-12 NOTE — NURSING NOTE
"Chief Complaints and History of Present Illnesses   Patient presents with     Consult For     Enucleation Right Eye     Chief Complaint(s) and History of Present Illness(es)     Consult For     Laterality: right eye    Associated symptoms: redness, tearing, photophobia and headache.  Negative for eye pain and weight loss    Comments: Enucleation Right Eye              Comments     Richie Gordillo is being seen today by the request of Dr. Avila for Right Eye Enucleation.    Patient notes that he has pain in the RE that comes and goes, at one point he was having \"excruciating\" pain in the RE, was thought to be allergies to OTC gtts, now pain comes and goes, and ache or a burn, using generic Maxitrol TID RE prn for pain.     Yumiko Franklin August 12, 2019 7:44 AM                  "

## 2019-08-12 NOTE — PATIENT INSTRUCTIONS
Enucleation and Evisceration     What are enucleation and evisceration?  Enucleation is the surgical removal of the entire eye. Evisceration is the surgical removal of the contents of the eye, leaving the white part of the eye and the eye muscles intact.     Why is enucleation or evisceration necessary?  Removal of an eye may be required following a severe injury, to control pain in a blind eye, to treat some intra-ocular tumors, to alleviate a severe infection inside the eye, or for cosmetic improvement of a disfigured eye.     Why chose one procedure over another?  Enucleation is the procedure of choice if the eye is being removed to treat an intraocular tumor, or to try to reduce the risk of developing a severe autoimmune condition called sympathetic ophthalmia following trauma. In most other situations, either enucleation or evisceration can each achieve the desired objective. Your surgeon will help you to determine which surgery is most appropriate for your condition.     How is the surgery performed?  Both surgeries are usually performed in the operating room under general anesthesia, although they can be completed safely using local anesthesia with sedation. After enucleation or evisceration, most of the lost volume is replaced by an implant placed in the eye socket. The implant is a usually a sphere made of silicone rubber, polyethylene, hydroxyapatite, or alumina, and is covered by the patient's own tissue. In most cases, the eye muscles are attached to the implant following enucleation, in order to preserve eye movement. Several weeks after surgery, an artificial eye, or prosthesis, is made by an . The front surface of the artificial eye is custom painted to match the patient's other eye. The back surface is custom molded to fit exactly in the eye socket for maximum comfort and movement. The prosthesis is easily removable, and may be removed as needed for cleaning. Most patients sleep with the  prosthesis in place. A prosthesis lasts up to 5-10 years in many patients     What is the post-operative care?  Some patients spend the night at the hospital, while others go home the same day as surgery. You may be asked to take medications after surgery such as antibiotics, steroids, or pain-relievers. Patients may wear a patch after surgery for several days to several weeks, until they receive their prosthesis. Continued follow-up is important as the tissues in the socket may atrophy (shrink) with time. This loss of volume may lead to eyelid laxity or socket changes that may affect the fit of the prosthesis. Careful monitoring of the socket and prosthesis by the surgeon and the  will help keep the socket healthy, and will allow for early detection of any changes that may require further treatment.     What are the risks and complications?  Short-term risks for this surgery, as with any surgery, include bleeding and infection. Longer-range complications include discharge and socket irritation or exposure of the implant. As with any medical procedure, there may be other inherent risks that should be discussed with your surgeon.     Who performs the surgery?  Patients are most commonly treated by ophthalmic plastic and reconstructive surgeons who specialize in diseases and problems of the eyelids, tear drain, and orbit (the area around the eye). Dr. Mims has completed an American Society of Ophthalmic Plastic and Reconstructive [employer] (ASOPRS) fellowship. This indicates that he is not only a board certified ophthalmologist, but also has had extensive training in ophthalmic plastic surgery. When you are ready, you will be in experienced hands. Your surgery will be in an outpatient facility or at a hospital depending on your surgical needs.

## 2019-08-12 NOTE — LETTER
2019         RE:  :  MRN: Richie Gordillo  1940  [id number]     Dear Dr. Avila,    Thank you for asking me to see your patient, Richie Gordillo, for an oculoplastic   consultation.  My assessment and plan are below.  For further details, please see my attached clinic note.           Assessment & Plan     Richie Gordillo is a 78 year old male with the following diagnoses:   1. Blind painful eye    2. Band keratopathy of right eye       Recommend evisceration right eye    -Verified no light perception vision right eye  -Will discuss stopping ASA and plavix with PCP   -Monocular precautions discussed. Patient reports has polycarb lenses  -B scan U/S today        Again, thank you for allowing me to participate in the care of your patient.      Sincerely,    Moe Mims MD  Department of Ophthalmology and Visual Neurosciences  [facility]    CC: Mando Avila MD  [facility]   [facility] [address]  [facility] [place] [facility] [address]  VIA Facsimile: [phone]     [address] [address]  VIA Facsimile: [phone]

## 2019-08-12 NOTE — LETTER
August 13, 2019      Richie Gordillo  [address] [facility] [address]        Dear Richie,    Please see below for your test results. There was no evidence for tumor.     Resulted Orders   Ultrasound B-scan OD (right eye)    Narrative    Performed by: dcm   .   Retina attached, negative for patent intraocular or orbital masses or   other atypical findings. Note a focal chorioretinal elevation beyond   mid-periphery superiorly (12 o'clock) with orbital shadowing c/w   calcification, also there is diffuse chorioretinal / scleral thickening   t/out posterior, expected given patient's history. Negative for vitreitis,   negative for IOFB. .        If you have any questions, please call the clinic to make an appointment.    Sincerely,    Moe Mims MD

## 2019-08-12 NOTE — PROGRESS NOTES
"Chief Complaints and History of Present Illnesses   Patient presents with     Consult For     Enucleation Right Eye     Chief Complaint(s) and History of Present Illness(es)     Consult For    -Richie Gordillo is being seen today by the request of Dr. Avila for Right Eye Enucleation.  -Patient notes that he has pain in the RE that comes and goes, at one point he was having \"excruciating\" pain in the RE, was thought to be allergies to OTC gtts, now pain comes and goes, and ache or a burn, using generic Maxitrol TID RE prn for pain.   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   -Patient reports pain in the right eye for at least the past 2 years. Eye always feels \"irritated and bothersome.\" Taking maxitrol 1-3x/day in the right eye.    On plavix for history of multiple strokes (vertebral artery disease) and leg stents. Taking ASA 81mg daily.         Assessment & Plan     Richie Gordillo is a 78 year old male with the following diagnoses:   1. Blind painful eye    2. Band keratopathy of right eye       Recommend evisceration right eye    -Verified no light perception vision right eye  -Will discuss stopping ASA and plavix with PCP   -Monocular precautions discussed. Patient reports has polycarb lenses  -B scan U/S today          Oliva Martin MD  Oculoplastics Fellow    Attending Physician Attestation:  Complete documentation of historical and exam elements from today's encounter can be found in the full encounter summary report (not reduplicated in this progress note).  I personally obtained the chief complaint(s) and history of present illness.  I confirmed and edited as necessary the review of systems, past " medical/surgical history, family history, social history, and examination findings as documented by others; and I examined the patient myself.  I personally reviewed the relevant tests, images, and reports as documented above.  I formulated and edited as necessary the assessment and plan and discussed the findings and management plan with the patient and family. I personally reviewed the ophthalmic test(s) associated with this encounter, agree with the interpretation(s) as documented by the resident/fellow, and have edited the corresponding report(s) as necessary.   -Moe Mims MD    Today with Richie Gordillo  and his family, I reviewed the indications, risks, benefits, and alternatives of the proposed surgical procedure including, but not limited to, failure obtain the desired result  and need for additional surgery, bleeding, infection, loss of vision, loss of the eye, and the remote possibility of permanent damage to any organ system or death with the use of anesthesia.  I provided multiple opportunities for the questions, answered all questions to the best of my ability, and confirmed that my answers and my discussion were understood.     - Moe Mims MD 8:57 AM 8/12/2019

## 2019-08-13 ENCOUNTER — HOSPITAL ENCOUNTER (OUTPATIENT)
Dept: CARDIOLOGY | Facility: CLINIC | Age: 79
Discharge: HOME | End: 2019-08-13
Attending: INTERNAL MEDICINE

## 2019-08-13 DIAGNOSIS — I35.0 AORTIC VALVE STENOSIS, ETIOLOGY OF CARDIAC VALVE DISEASE UNSPECIFIED: ICD-10-CM

## 2019-08-13 ASSESSMENT — MIFFLIN-ST. JEOR: SCORE: 1381.7

## 2019-08-14 ENCOUNTER — COMMUNICATION - HEALTHEAST (OUTPATIENT)
Dept: INTERNAL MEDICINE | Facility: CLINIC | Age: 79
End: 2019-08-14

## 2019-08-14 LAB
AORTIC ROOT: 3.3 CM
AORTIC VALVE MEAN VELOCITY: 232 CM/S
AV DIMENSIONLESS INDEX VTI: 0.4
AV MEAN GRADIENT: 23 MMHG
AV PEAK GRADIENT: 32.9 MMHG
AV VALVE AREA: 1.3 CM2
AV VELOCITY RATIO: 0.4
BSA FOR ECHO PROCEDURE: 1.81 M2
CV BLOOD PRESSURE: ABNORMAL MMHG
CV ECHO HEIGHT: 69 IN
CV ECHO WEIGHT: 148 LBS
DOP CALC AO PEAK VEL: 287 CM/S
DOP CALC AO VTI: 60.8 CM
DOP CALC LVOT AREA: 3.14 CM2
DOP CALC LVOT DIAMETER: 2 CM
DOP CALC LVOT PEAK VEL: 115 CM/S
DOP CALC LVOT STROKE VOLUME: 79.1 CM3
DOP CALCLVOT PEAK VEL VTI: 25.2 CM
EJECTION FRACTION: 57 % (ref 55–75)
FRACTIONAL SHORTENING: 31.1 % (ref 28–44)
INTERVENTRICULAR SEPTUM IN END DIASTOLE: 1.31 CM (ref 0.6–1)
IVS/PW RATIO: 1.2
LA AREA 1: 24 CM2
LA AREA 2: 22 CM2
LEFT ATRIUM LENGTH: 5.22 CM
LEFT ATRIUM SIZE: 4.5 CM
LEFT ATRIUM VOLUME INDEX: 47.5 ML/M2
LEFT ATRIUM VOLUME: 86 ML
LEFT VENTRICLE CARDIAC INDEX: 2.6 L/MIN/M2
LEFT VENTRICLE CARDIAC OUTPUT: 4.7 L/MIN
LEFT VENTRICLE DIASTOLIC VOLUME INDEX: 37.6 CM3/M2 (ref 34–74)
LEFT VENTRICLE DIASTOLIC VOLUME: 68 CM3 (ref 62–150)
LEFT VENTRICLE HEART RATE: 60 BPM
LEFT VENTRICLE MASS INDEX: 108 G/M2
LEFT VENTRICLE SYSTOLIC VOLUME INDEX: 16 CM3/M2 (ref 11–31)
LEFT VENTRICLE SYSTOLIC VOLUME: 29 CM3 (ref 21–61)
LEFT VENTRICULAR INTERNAL DIMENSION IN DIASTOLE: 4.41 CM (ref 4.2–5.8)
LEFT VENTRICULAR INTERNAL DIMENSION IN SYSTOLE: 3.04 CM (ref 2.5–4)
LEFT VENTRICULAR MASS: 195.5 G
LEFT VENTRICULAR OUTFLOW TRACT MEAN GRADIENT: 5 MMHG
LEFT VENTRICULAR OUTFLOW TRACT MEAN VELOCITY: 105 CM/S
LEFT VENTRICULAR OUTFLOW TRACT PEAK GRADIENT: 5 MMHG
LEFT VENTRICULAR POSTERIOR WALL IN END DIASTOLE: 1.12 CM (ref 0.6–1)
LV STROKE VOLUME INDEX: 43.7 ML/M2
MITRAL VALVE E/A RATIO: 1.1
MV AVERAGE E/E' RATIO: 14.9 CM/S
MV DECELERATION TIME: 331 MS
MV E'TISSUE VEL-LAT: 6.73 CM/S
MV E'TISSUE VEL-MED: 4.09 CM/S
MV LATERAL E/E' RATIO: 12
MV MEDIAL E/E' RATIO: 19.7
MV PEAK A VELOCITY: 73.1 CM/S
MV PEAK E VELOCITY: 80.5 CM/S
NUC REST DIASTOLIC VOLUME INDEX: 2368 LBS
NUC REST SYSTOLIC VOLUME INDEX: 69 IN
TRICUSPID REGURGITATION PEAK PRESSURE GRADIENT: 28.5 MMHG
TRICUSPID VALVE ANULAR PLANE SYSTOLIC EXCURSION: 3.2 CM
TRICUSPID VALVE PEAK REGURGITANT VELOCITY: 267 CM/S

## 2019-08-16 ENCOUNTER — TELEPHONE (OUTPATIENT)
Dept: OPHTHALMOLOGY | Facility: CLINIC | Age: 79
End: 2019-08-16

## 2019-08-16 NOTE — TELEPHONE ENCOUNTER
Spoke with patient to schedule surgery with Dr. Moe Mims.    Surgery was scheduled on 10/30 at [facility]  Patient will have H&P at [facility]   Post-Op visit was scheduled on 11/4  Patient is aware a / is needed day of surgery.   Surgery packet was mailed, patient has my direct contact information for any further questions.

## 2019-08-20 ENCOUNTER — OFFICE VISIT - HEALTHEAST (OUTPATIENT)
Dept: INTERNAL MEDICINE | Facility: CLINIC | Age: 79
End: 2019-08-20

## 2019-08-20 DIAGNOSIS — Z86.0100 HISTORY OF COLONIC POLYPS: ICD-10-CM

## 2019-08-20 DIAGNOSIS — I10 HYPERTENSION: ICD-10-CM

## 2019-08-20 DIAGNOSIS — Z51.81 ENCOUNTER FOR THERAPEUTIC DRUG MONITORING: ICD-10-CM

## 2019-08-20 DIAGNOSIS — N18.9 CHRONIC KIDNEY DISEASE, UNSPECIFIED CKD STAGE: ICD-10-CM

## 2019-08-20 DIAGNOSIS — Z86.73 HISTORY OF STROKE: ICD-10-CM

## 2019-08-20 DIAGNOSIS — I10 ESSENTIAL HYPERTENSION: ICD-10-CM

## 2019-08-20 DIAGNOSIS — J43.9 PULMONARY EMPHYSEMA, UNSPECIFIED EMPHYSEMA TYPE (H): ICD-10-CM

## 2019-08-20 DIAGNOSIS — I65.23 CAROTID ARTERY STENOSIS, ASYMPTOMATIC, BILATERAL: ICD-10-CM

## 2019-08-20 DIAGNOSIS — I35.0 AORTIC STENOSIS, MODERATE: ICD-10-CM

## 2019-08-20 LAB
ANION GAP SERPL CALCULATED.3IONS-SCNC: 9 MMOL/L (ref 5–18)
BUN SERPL-MCNC: 18 MG/DL (ref 8–28)
CALCIUM SERPL-MCNC: 9.3 MG/DL (ref 8.5–10.5)
CHLORIDE BLD-SCNC: 94 MMOL/L (ref 98–107)
CO2 SERPL-SCNC: 25 MMOL/L (ref 22–31)
CREAT SERPL-MCNC: 1.45 MG/DL (ref 0.7–1.3)
GFR SERPL CREATININE-BSD FRML MDRD: 47 ML/MIN/1.73M2
GLUCOSE BLD-MCNC: 102 MG/DL (ref 70–125)
POTASSIUM BLD-SCNC: 4.9 MMOL/L (ref 3.5–5)
SODIUM SERPL-SCNC: 128 MMOL/L (ref 136–145)

## 2019-08-21 ENCOUNTER — COMMUNICATION - HEALTHEAST (OUTPATIENT)
Dept: INTERNAL MEDICINE | Facility: CLINIC | Age: 79
End: 2019-08-21

## 2019-08-21 DIAGNOSIS — I10 HYPERTENSION: ICD-10-CM

## 2019-08-29 DIAGNOSIS — H57.10 BLIND PAINFUL EYE: Primary | ICD-10-CM

## 2019-08-29 DIAGNOSIS — H54.40 BLIND PAINFUL EYE: Primary | ICD-10-CM

## 2019-08-29 RX ORDER — NEOMYCIN SULFATE, POLYMYXIN B SULFATE AND DEXAMETHASONE 3.5; 10000; 1 MG/ML; [USP'U]/ML; MG/ML
1-2 SUSPENSION/ DROPS OPHTHALMIC 3 TIMES DAILY PRN
Qty: 1 BOTTLE | Refills: 1 | Status: SHIPPED | OUTPATIENT
Start: 2019-08-29 | End: 2020-02-10

## 2019-09-26 ENCOUNTER — OFFICE VISIT - HEALTHEAST (OUTPATIENT)
Dept: INTERNAL MEDICINE | Facility: CLINIC | Age: 79
End: 2019-09-26

## 2019-09-26 DIAGNOSIS — I65.23 CAROTID ARTERY STENOSIS, ASYMPTOMATIC, BILATERAL: ICD-10-CM

## 2019-09-26 DIAGNOSIS — J43.9 PULMONARY EMPHYSEMA, UNSPECIFIED EMPHYSEMA TYPE (H): ICD-10-CM

## 2019-09-26 DIAGNOSIS — I10 ESSENTIAL HYPERTENSION: ICD-10-CM

## 2019-09-26 DIAGNOSIS — I35.0 AORTIC STENOSIS, MODERATE: ICD-10-CM

## 2019-09-26 DIAGNOSIS — Z86.0100 HISTORY OF COLONIC POLYPS: ICD-10-CM

## 2019-09-26 DIAGNOSIS — N18.9 CHRONIC KIDNEY DISEASE, UNSPECIFIED CKD STAGE: ICD-10-CM

## 2019-09-26 DIAGNOSIS — Z51.81 MEDICATION MONITORING ENCOUNTER: ICD-10-CM

## 2019-09-26 LAB
ANION GAP SERPL CALCULATED.3IONS-SCNC: 10 MMOL/L (ref 5–18)
BUN SERPL-MCNC: 21 MG/DL (ref 8–28)
CALCIUM SERPL-MCNC: 8.9 MG/DL (ref 8.5–10.5)
CHLORIDE BLD-SCNC: 98 MMOL/L (ref 98–107)
CO2 SERPL-SCNC: 26 MMOL/L (ref 22–31)
CREAT SERPL-MCNC: 1.24 MG/DL (ref 0.7–1.3)
GFR SERPL CREATININE-BSD FRML MDRD: 57 ML/MIN/1.73M2
GLUCOSE BLD-MCNC: 95 MG/DL (ref 70–125)
POTASSIUM BLD-SCNC: 4.3 MMOL/L (ref 3.5–5)
SODIUM SERPL-SCNC: 134 MMOL/L (ref 136–145)

## 2019-09-27 ENCOUNTER — COMMUNICATION - HEALTHEAST (OUTPATIENT)
Dept: INTERNAL MEDICINE | Facility: CLINIC | Age: 79
End: 2019-09-27

## 2019-10-16 ENCOUNTER — OFFICE VISIT - HEALTHEAST (OUTPATIENT)
Dept: INTERNAL MEDICINE | Facility: CLINIC | Age: 79
End: 2019-10-16

## 2019-10-16 DIAGNOSIS — Z87.891 HISTORY OF TOBACCO USE: ICD-10-CM

## 2019-10-16 DIAGNOSIS — H54.40 BLINDNESS OF RIGHT EYE, UNSPECIFIED LEFT EYE VISUAL IMPAIRMENT CATEGORY: ICD-10-CM

## 2019-10-16 DIAGNOSIS — I10 ESSENTIAL HYPERTENSION: ICD-10-CM

## 2019-10-16 DIAGNOSIS — I65.23 CAROTID ARTERY STENOSIS, ASYMPTOMATIC, BILATERAL: ICD-10-CM

## 2019-10-16 DIAGNOSIS — Z01.818 PREOPERATIVE EXAMINATION: ICD-10-CM

## 2019-10-16 DIAGNOSIS — I35.0 AORTIC STENOSIS, MODERATE: ICD-10-CM

## 2019-10-16 DIAGNOSIS — N18.9 CHRONIC KIDNEY DISEASE, UNSPECIFIED CKD STAGE: ICD-10-CM

## 2019-10-16 DIAGNOSIS — Z51.81 MEDICATION MONITORING ENCOUNTER: ICD-10-CM

## 2019-10-16 DIAGNOSIS — J43.9 PULMONARY EMPHYSEMA, UNSPECIFIED EMPHYSEMA TYPE (H): ICD-10-CM

## 2019-10-16 DIAGNOSIS — Z86.0100 HISTORY OF COLONIC POLYPS: ICD-10-CM

## 2019-10-16 LAB
ALBUMIN SERPL-MCNC: 3.3 G/DL (ref 3.5–5)
ALP SERPL-CCNC: 70 U/L (ref 45–120)
ALT SERPL W P-5'-P-CCNC: <9 U/L (ref 0–45)
ANION GAP SERPL CALCULATED.3IONS-SCNC: 9 MMOL/L (ref 5–18)
AST SERPL W P-5'-P-CCNC: 14 U/L (ref 0–40)
BILIRUB SERPL-MCNC: 0.3 MG/DL (ref 0–1)
BUN SERPL-MCNC: 16 MG/DL (ref 8–28)
CALCIUM SERPL-MCNC: 9 MG/DL (ref 8.5–10.5)
CHLORIDE BLD-SCNC: 99 MMOL/L (ref 98–107)
CO2 SERPL-SCNC: 27 MMOL/L (ref 22–31)
CREAT SERPL-MCNC: 1.41 MG/DL (ref 0.7–1.3)
ERYTHROCYTE [DISTWIDTH] IN BLOOD BY AUTOMATED COUNT: 12 % (ref 11–14.5)
GFR SERPL CREATININE-BSD FRML MDRD: 49 ML/MIN/1.73M2
GLUCOSE BLD-MCNC: 104 MG/DL (ref 70–125)
HCT VFR BLD AUTO: 39.4 % (ref 40–54)
HGB BLD-MCNC: 13.3 G/DL (ref 14–18)
MCH RBC QN AUTO: 31.4 PG (ref 27–34)
MCHC RBC AUTO-ENTMCNC: 33.7 G/DL (ref 32–36)
MCV RBC AUTO: 93 FL (ref 80–100)
PLATELET # BLD AUTO: 271 THOU/UL (ref 140–440)
PMV BLD AUTO: 7.8 FL (ref 7–10)
POTASSIUM BLD-SCNC: 5.2 MMOL/L (ref 3.5–5)
PROT SERPL-MCNC: 5.9 G/DL (ref 6–8)
RBC # BLD AUTO: 4.22 MILL/UL (ref 4.4–6.2)
SODIUM SERPL-SCNC: 135 MMOL/L (ref 136–145)
WBC: 8.4 THOU/UL (ref 4–11)

## 2019-10-16 ASSESSMENT — MIFFLIN-ST. JEOR: SCORE: 1351.87

## 2019-10-17 LAB
ATRIAL RATE - MUSE: 56 BPM
DIASTOLIC BLOOD PRESSURE - MUSE: NORMAL
INTERPRETATION ECG - MUSE: NORMAL
P AXIS - MUSE: 84 DEGREES
PR INTERVAL - MUSE: 218 MS
QRS DURATION - MUSE: 86 MS
QT - MUSE: 414 MS
QTC - MUSE: 399 MS
R AXIS - MUSE: 32 DEGREES
SYSTOLIC BLOOD PRESSURE - MUSE: NORMAL
T AXIS - MUSE: 109 DEGREES
VENTRICULAR RATE- MUSE: 56 BPM

## 2019-10-21 ENCOUNTER — COMMUNICATION - HEALTHEAST (OUTPATIENT)
Dept: INTERNAL MEDICINE | Facility: CLINIC | Age: 79
End: 2019-10-21

## 2019-10-29 ENCOUNTER — ANESTHESIA EVENT (OUTPATIENT)
Dept: SURGERY | Facility: AMBULATORY SURGERY CENTER | Age: 79
End: 2019-10-29

## 2019-10-30 ENCOUNTER — ANESTHESIA (OUTPATIENT)
Dept: SURGERY | Facility: AMBULATORY SURGERY CENTER | Age: 79
End: 2019-10-30

## 2019-10-30 ENCOUNTER — HOSPITAL ENCOUNTER (OUTPATIENT)
Facility: AMBULATORY SURGERY CENTER | Age: 79
End: 2019-10-30
Attending: OPHTHALMOLOGY
Payer: COMMERCIAL

## 2019-10-30 VITALS
HEIGHT: 68 IN | HEART RATE: 65 BPM | DIASTOLIC BLOOD PRESSURE: 52 MMHG | TEMPERATURE: 98.3 F | BODY MASS INDEX: 22.28 KG/M2 | WEIGHT: 147 LBS | OXYGEN SATURATION: 92 % | SYSTOLIC BLOOD PRESSURE: 105 MMHG | RESPIRATION RATE: 20 BRPM

## 2019-10-30 DIAGNOSIS — Z98.890 POSTOPERATIVE EYE STATE: Primary | ICD-10-CM

## 2019-10-30 DEVICE — EYE IMP SPHERE SILICONE 18MM S6.1018U: Type: IMPLANTABLE DEVICE | Site: EYE | Status: FUNCTIONAL

## 2019-10-30 RX ORDER — ONDANSETRON 2 MG/ML
INJECTION INTRAMUSCULAR; INTRAVENOUS PRN
Status: DISCONTINUED | OUTPATIENT
Start: 2019-10-30 | End: 2019-10-30

## 2019-10-30 RX ORDER — NEOMYCIN SULFATE, POLYMYXIN B SULFATE AND DEXAMETHASONE 3.5; 10000; 1 MG/ML; [USP'U]/ML; MG/ML
1-2 SUSPENSION/ DROPS OPHTHALMIC 4 TIMES DAILY
Qty: 5 ML | Refills: 0 | Status: SHIPPED | OUTPATIENT
Start: 2019-10-30 | End: 2019-11-15

## 2019-10-30 RX ORDER — LIDOCAINE 40 MG/G
CREAM TOPICAL
Status: DISCONTINUED | OUTPATIENT
Start: 2019-10-30 | End: 2019-10-30 | Stop reason: HOSPADM

## 2019-10-30 RX ORDER — PROPOFOL 10 MG/ML
INJECTION, EMULSION INTRAVENOUS PRN
Status: DISCONTINUED | OUTPATIENT
Start: 2019-10-30 | End: 2019-10-30

## 2019-10-30 RX ORDER — LIDOCAINE HYDROCHLORIDE 20 MG/ML
INJECTION, SOLUTION INFILTRATION; PERINEURAL PRN
Status: DISCONTINUED | OUTPATIENT
Start: 2019-10-30 | End: 2019-10-30

## 2019-10-30 RX ORDER — ONDANSETRON 4 MG/1
4 TABLET, ORALLY DISINTEGRATING ORAL EVERY 30 MIN PRN
Status: DISCONTINUED | OUTPATIENT
Start: 2019-10-30 | End: 2019-10-31 | Stop reason: HOSPADM

## 2019-10-30 RX ORDER — SODIUM CHLORIDE, SODIUM LACTATE, POTASSIUM CHLORIDE, CALCIUM CHLORIDE 600; 310; 30; 20 MG/100ML; MG/100ML; MG/100ML; MG/100ML
INJECTION, SOLUTION INTRAVENOUS CONTINUOUS
Status: DISCONTINUED | OUTPATIENT
Start: 2019-10-30 | End: 2019-10-31 | Stop reason: HOSPADM

## 2019-10-30 RX ORDER — HYDRALAZINE HYDROCHLORIDE 20 MG/ML
2.5-5 INJECTION INTRAMUSCULAR; INTRAVENOUS EVERY 10 MIN PRN
Status: DISCONTINUED | OUTPATIENT
Start: 2019-10-30 | End: 2019-10-30 | Stop reason: HOSPADM

## 2019-10-30 RX ORDER — CEFAZOLIN SODIUM 2 G/100ML
INJECTION, SOLUTION INTRAVENOUS PRN
Status: DISCONTINUED | OUTPATIENT
Start: 2019-10-30 | End: 2019-10-30

## 2019-10-30 RX ORDER — SODIUM CHLORIDE, SODIUM LACTATE, POTASSIUM CHLORIDE, CALCIUM CHLORIDE 600; 310; 30; 20 MG/100ML; MG/100ML; MG/100ML; MG/100ML
INJECTION, SOLUTION INTRAVENOUS CONTINUOUS
Status: DISCONTINUED | OUTPATIENT
Start: 2019-10-30 | End: 2019-10-30 | Stop reason: HOSPADM

## 2019-10-30 RX ORDER — ONDANSETRON 2 MG/ML
4 INJECTION INTRAMUSCULAR; INTRAVENOUS EVERY 30 MIN PRN
Status: DISCONTINUED | OUTPATIENT
Start: 2019-10-30 | End: 2019-10-31 | Stop reason: HOSPADM

## 2019-10-30 RX ORDER — HYDROMORPHONE HYDROCHLORIDE 1 MG/ML
.3-.5 INJECTION, SOLUTION INTRAMUSCULAR; INTRAVENOUS; SUBCUTANEOUS EVERY 10 MIN PRN
Status: DISCONTINUED | OUTPATIENT
Start: 2019-10-30 | End: 2019-10-31 | Stop reason: HOSPADM

## 2019-10-30 RX ORDER — OXYCODONE HYDROCHLORIDE 5 MG/1
5 TABLET ORAL EVERY 4 HOURS PRN
Status: DISCONTINUED | OUTPATIENT
Start: 2019-10-30 | End: 2019-10-31 | Stop reason: HOSPADM

## 2019-10-30 RX ORDER — ERYTHROMYCIN 5 MG/G
OINTMENT OPHTHALMIC
Qty: 3.5 G | Refills: 0 | Status: SHIPPED | OUTPATIENT
Start: 2019-10-30 | End: 2020-02-10

## 2019-10-30 RX ORDER — LIDOCAINE HYDROCHLORIDE AND EPINEPHRINE 10; 10 MG/ML; UG/ML
INJECTION, SOLUTION INFILTRATION; PERINEURAL PRN
Status: DISCONTINUED | OUTPATIENT
Start: 2019-10-30 | End: 2019-10-30 | Stop reason: HOSPADM

## 2019-10-30 RX ORDER — FENTANYL CITRATE 50 UG/ML
25-50 INJECTION, SOLUTION INTRAMUSCULAR; INTRAVENOUS
Status: DISCONTINUED | OUTPATIENT
Start: 2019-10-30 | End: 2019-10-30 | Stop reason: HOSPADM

## 2019-10-30 RX ORDER — GLYCOPYRROLATE 0.2 MG/ML
INJECTION, SOLUTION INTRAMUSCULAR; INTRAVENOUS PRN
Status: DISCONTINUED | OUTPATIENT
Start: 2019-10-30 | End: 2019-10-30

## 2019-10-30 RX ORDER — ACETAMINOPHEN 325 MG/1
975 TABLET ORAL ONCE
Status: COMPLETED | OUTPATIENT
Start: 2019-10-30 | End: 2019-10-30

## 2019-10-30 RX ORDER — FENTANYL CITRATE 50 UG/ML
INJECTION, SOLUTION INTRAMUSCULAR; INTRAVENOUS PRN
Status: DISCONTINUED | OUTPATIENT
Start: 2019-10-30 | End: 2019-10-30

## 2019-10-30 RX ORDER — PROPOFOL 10 MG/ML
INJECTION, EMULSION INTRAVENOUS CONTINUOUS PRN
Status: DISCONTINUED | OUTPATIENT
Start: 2019-10-30 | End: 2019-10-30

## 2019-10-30 RX ORDER — MEPERIDINE HYDROCHLORIDE 25 MG/ML
12.5 INJECTION INTRAMUSCULAR; INTRAVENOUS; SUBCUTANEOUS
Status: DISCONTINUED | OUTPATIENT
Start: 2019-10-30 | End: 2019-10-31 | Stop reason: HOSPADM

## 2019-10-30 RX ORDER — CEPHALEXIN 500 MG/1
500 CAPSULE ORAL 2 TIMES DAILY
Qty: 14 CAPSULE | Refills: 0 | Status: SHIPPED | OUTPATIENT
Start: 2019-10-30 | End: 2020-02-10

## 2019-10-30 RX ORDER — EPHEDRINE SULFATE 50 MG/ML
INJECTION, SOLUTION INTRAMUSCULAR; INTRAVENOUS; SUBCUTANEOUS PRN
Status: DISCONTINUED | OUTPATIENT
Start: 2019-10-30 | End: 2019-10-30

## 2019-10-30 RX ORDER — NALOXONE HYDROCHLORIDE 0.4 MG/ML
.1-.4 INJECTION, SOLUTION INTRAMUSCULAR; INTRAVENOUS; SUBCUTANEOUS
Status: DISCONTINUED | OUTPATIENT
Start: 2019-10-30 | End: 2019-10-31 | Stop reason: HOSPADM

## 2019-10-30 RX ORDER — FENTANYL CITRATE 50 UG/ML
25-50 INJECTION, SOLUTION INTRAMUSCULAR; INTRAVENOUS
Status: DISCONTINUED | OUTPATIENT
Start: 2019-10-30 | End: 2019-10-31 | Stop reason: HOSPADM

## 2019-10-30 RX ORDER — DEXAMETHASONE SODIUM PHOSPHATE 4 MG/ML
INJECTION, SOLUTION INTRA-ARTICULAR; INTRALESIONAL; INTRAMUSCULAR; INTRAVENOUS; SOFT TISSUE PRN
Status: DISCONTINUED | OUTPATIENT
Start: 2019-10-30 | End: 2019-10-30

## 2019-10-30 RX ORDER — OXYCODONE HYDROCHLORIDE 5 MG/1
5 TABLET ORAL EVERY 4 HOURS PRN
Qty: 6 TABLET | Refills: 0 | Status: SHIPPED | OUTPATIENT
Start: 2019-10-30 | End: 2020-02-10

## 2019-10-30 RX ORDER — BALANCED SALT SOLUTION 6.4; .75; .48; .3; 3.9; 1.7 MG/ML; MG/ML; MG/ML; MG/ML; MG/ML; MG/ML
SOLUTION OPHTHALMIC PRN
Status: DISCONTINUED | OUTPATIENT
Start: 2019-10-30 | End: 2019-10-30 | Stop reason: HOSPADM

## 2019-10-30 RX ORDER — METOPROLOL TARTRATE 1 MG/ML
1-2 INJECTION, SOLUTION INTRAVENOUS EVERY 5 MIN PRN
Status: DISCONTINUED | OUTPATIENT
Start: 2019-10-30 | End: 2019-10-30 | Stop reason: HOSPADM

## 2019-10-30 RX ORDER — SODIUM CHLORIDE, SODIUM LACTATE, POTASSIUM CHLORIDE, CALCIUM CHLORIDE 600; 310; 30; 20 MG/100ML; MG/100ML; MG/100ML; MG/100ML
INJECTION, SOLUTION INTRAVENOUS CONTINUOUS PRN
Status: DISCONTINUED | OUTPATIENT
Start: 2019-10-30 | End: 2019-10-30

## 2019-10-30 RX ORDER — ERYTHROMYCIN 5 MG/G
OINTMENT OPHTHALMIC PRN
Status: DISCONTINUED | OUTPATIENT
Start: 2019-10-30 | End: 2019-10-30 | Stop reason: HOSPADM

## 2019-10-30 RX ADMIN — ONDANSETRON 4 MG: 2 INJECTION INTRAMUSCULAR; INTRAVENOUS at 15:03

## 2019-10-30 RX ADMIN — CEFAZOLIN SODIUM 2 G: 2 INJECTION, SOLUTION INTRAVENOUS at 14:54

## 2019-10-30 RX ADMIN — SODIUM CHLORIDE, SODIUM LACTATE, POTASSIUM CHLORIDE, CALCIUM CHLORIDE: 600; 310; 30; 20 INJECTION, SOLUTION INTRAVENOUS at 12:56

## 2019-10-30 RX ADMIN — DEXAMETHASONE SODIUM PHOSPHATE 6 MG: 4 INJECTION, SOLUTION INTRA-ARTICULAR; INTRALESIONAL; INTRAMUSCULAR; INTRAVENOUS; SOFT TISSUE at 14:55

## 2019-10-30 RX ADMIN — Medication 200 MCG: at 14:50

## 2019-10-30 RX ADMIN — EPHEDRINE SULFATE 5 MG: 50 INJECTION, SOLUTION INTRAMUSCULAR; INTRAVENOUS; SUBCUTANEOUS at 15:03

## 2019-10-30 RX ADMIN — PROPOFOL 150 MCG/KG/MIN: 10 INJECTION, EMULSION INTRAVENOUS at 14:42

## 2019-10-30 RX ADMIN — LIDOCAINE HYDROCHLORIDE 100 MG: 20 INJECTION, SOLUTION INFILTRATION; PERINEURAL at 14:42

## 2019-10-30 RX ADMIN — Medication 200 MCG: at 14:48

## 2019-10-30 RX ADMIN — SODIUM CHLORIDE, SODIUM LACTATE, POTASSIUM CHLORIDE, CALCIUM CHLORIDE: 600; 310; 30; 20 INJECTION, SOLUTION INTRAVENOUS at 14:38

## 2019-10-30 RX ADMIN — EPHEDRINE SULFATE 5 MG: 50 INJECTION, SOLUTION INTRAMUSCULAR; INTRAVENOUS; SUBCUTANEOUS at 14:51

## 2019-10-30 RX ADMIN — Medication 200 MCG: at 15:12

## 2019-10-30 RX ADMIN — ACETAMINOPHEN 975 MG: 325 TABLET ORAL at 12:47

## 2019-10-30 RX ADMIN — GLYCOPYRROLATE 0.2 MG: 0.2 INJECTION, SOLUTION INTRAMUSCULAR; INTRAVENOUS at 15:05

## 2019-10-30 RX ADMIN — Medication 200 MCG: at 15:22

## 2019-10-30 RX ADMIN — SODIUM CHLORIDE, SODIUM LACTATE, POTASSIUM CHLORIDE, CALCIUM CHLORIDE: 600; 310; 30; 20 INJECTION, SOLUTION INTRAVENOUS at 12:54

## 2019-10-30 RX ADMIN — EPHEDRINE SULFATE 5 MG: 50 INJECTION, SOLUTION INTRAMUSCULAR; INTRAVENOUS; SUBCUTANEOUS at 15:12

## 2019-10-30 RX ADMIN — PROPOFOL 120 MG: 10 INJECTION, EMULSION INTRAVENOUS at 14:42

## 2019-10-30 RX ADMIN — FENTANYL CITRATE 50 MCG: 50 INJECTION, SOLUTION INTRAMUSCULAR; INTRAVENOUS at 14:42

## 2019-10-30 RX ADMIN — DEXAMETHASONE SODIUM PHOSPHATE 4 MG: 4 INJECTION, SOLUTION INTRA-ARTICULAR; INTRALESIONAL; INTRAMUSCULAR; INTRAVENOUS; SOFT TISSUE at 14:42

## 2019-10-30 RX ADMIN — EPHEDRINE SULFATE 5 MG: 50 INJECTION, SOLUTION INTRAMUSCULAR; INTRAVENOUS; SUBCUTANEOUS at 14:48

## 2019-10-30 RX ADMIN — OXYCODONE HYDROCHLORIDE 5 MG: 5 TABLET ORAL at 16:16

## 2019-10-30 RX ADMIN — EPHEDRINE SULFATE 5 MG: 50 INJECTION, SOLUTION INTRAMUSCULAR; INTRAVENOUS; SUBCUTANEOUS at 15:22

## 2019-10-30 RX ADMIN — Medication 200 MCG: at 15:03

## 2019-10-30 ASSESSMENT — MIFFLIN-ST. JEOR: SCORE: 1367.32

## 2019-10-30 ASSESSMENT — COPD QUESTIONNAIRES: COPD: 1

## 2019-10-30 NOTE — OP NOTE
PREOPERATIVE DIAGNOSIS:  Right blind painful eye secondary to Central retinal artery occlusion.  POSTOPERATIVE DIAGNOSIS: Right  blind painful eye secondary to Central retinal artery occlusion.  PROCEDURES PERFORMED: Evisceration of right  with placement of a 18  mm  silicone implant. Right temporary tarsorrhaphy.  SURGEON: Moe Mims MD.   ASSISTANTS: Oliva Martin MD and Maricruz Dillon MD   ANESTHESIA: General via endotracheal tube and a retrobulbar block consisting of 1% lidocaine with epinephrine.   COMPLICATIONS: None.   ESTIMATED BLOOD LOSS: Less than 10 mL.   SPECIMENS: Intraocular contents and cornea from left eye sent to pathology.   IMPLANTS: 18mm PMMA sphere.   HISTORY OF PRESENT ILLNESS: Richie Gordillo  presented with a  history of severe pain and blindness in the right eye. After the risks, benefits and alternatives to the proposed procedure were explained to the patient, informed consent was obtained.   DESCRIPTION OF PROCEDURE: Richie Gordillo  was brought to the operating room and placed supine on the operating table. General anesthesia was induced. The right was identified as the correct eye for surgery. Retrobulbar block was placed on the right. The area was  prepped and draped in the typical sterile ophthalmic fashion. A 360-degree conjunctival peritomy was performed. The limbal incision was made with the keratome and the cornea was excised with Lyudmila scissors, taking a rim of the sclera. Using the evisceration spoon, the intraocular contents were removed. Hemostasis was obtained with bipolar cautery. The scleral shell was rinsed with absolute alcohol. Hemostasis was again obtained with bipolar cautery. Relaxing incisions were made at the 2 and 7 o'clock positions on the sclera. Posterior  relaxing incisions were made in thesclera using the keratome to create a larger scleral volume to allow the 18 mm implant to be inserted and the sclera easily closed without tension over it.  The intraocular sizing spheres were used to determine the correct implant size. A 18 mm implant was chosen. The sclera was then closed with interrupted 5-0 Nylon sutures passed in mattress fashion. Tenons was closed with interrupted 6-0 Vicryl sutures. Conjunctiva was closed with a 6-0 Vicryl suture. A temporary tarsorrhaphy stitch of 6-0 Vicryl was placed through the lid margins.  Erythromycin ophthalmic ointment and a conformer were placedThe pressure patch was placed. The patient tolerated the procedure well, was awoken from general anesthesia and taken to recovery room in stable condition.     MARIIA HILARIO MD

## 2019-10-30 NOTE — ANESTHESIA PREPROCEDURE EVALUATION
Anesthesia Pre-Procedure Evaluation    Patient: Richie Gordillo   MRN:     [id number] Gender:   male   Age:    78 year old :      1940        Preoperative Diagnosis: Blind Painful Right Eye   Procedure(s):  Evisceration Right Eye     Past Medical History:   Diagnosis Date     CRAO (central retinal artery occlusion), right      Involutional ectropion of right eye      Phthisis bulbi of right eye       Past Surgical History:   Procedure Laterality Date     CATARACT IOL, RT/LT       DIODE LASER CYCLOPHOTOCOAGULATION OD (RIGHT EYE) Right           Anesthesia Evaluation     .             ROS/MED HX    ENT/Pulmonary:     (+)sleep apnea, COPD, doesn't use CPAP , . .    Neurologic:     (+)CVA (Cerebellar stroke in ) date:  without deficits    Cardiovascular: Comment: Carotid artery stenosis      (+) Dyslipidemia, hypertension----. : . . . :. valvular problems/murmurs type: AS . Previous cardiac testing Echodate:results:  Left ventricle ejection fraction is normal. The calculated left   ventricular ejection fraction is 57%.    Normal right ventricular size and systolic function.    Moderate aortic valve stenosis    Mild tricuspid regurgitation. No evidence of pulmonary hypertension.    When compared to the previous study dated 2015, the aortic valve   stenosis has mildly progressed.date: results: date: results: date: results:          METS/Exercise Tolerance:     Hematologic:  - neg hematologic  ROS       Musculoskeletal:  - neg musculoskeletal ROS       GI/Hepatic:  - neg GI/hepatic ROS       Renal/Genitourinary:     (+) BPH,       Endo:  - neg endo ROS       Psychiatric:     (+) psychiatric history depression      Infectious Disease:  - neg infectious disease ROS       Malignancy:         Other:                         PHYSICAL EXAM:   Mental Status/Neuro: A/A/O   Airway: Facies: Feasible  Mallampati: II  Mouth/Opening: Full  TM distance: > 6 cm  Neck ROM: Full   Respiratory: Auscultation: CTAB    "  Resp. Rate: Normal     Resp. Effort: Normal      CV: Rhythm: Regular  Rate: Age appropriate  Heart: Normal Sounds  Edema: None   Comments:      Dental: Normal Dentition                LABS:  CBC: No results found for: WBC, HGB, HCT, PLT  BMP: No results found for: NA, POTASSIUM, CHLORIDE, CO2, BUN, CR, GLC  COAGS: No results found for: PTT, INR, FIBR  POC: No results found for: BGM, HCG, HCGS  OTHER: No results found for: PH, LACT, A1C, KELSEY, PHOS, MAG, ALBUMIN, PROTTOTAL, ALT, AST, GGT, ALKPHOS, BILITOTAL, BILIDIRECT, LIPASE, AMYLASE, LANETTE, TSH, T4, T3, CRP, SED     Preop Vitals    BP Readings from Last 3 Encounters:   10/30/19 (!) 160/66    Pulse Readings from Last 3 Encounters:   10/30/19 56      Resp Readings from Last 3 Encounters:   10/30/19 16    SpO2 Readings from Last 3 Encounters:   10/30/19 97%      Temp Readings from Last 1 Encounters:   10/30/19 36.6  C (97.9  F) (Oral)    Ht Readings from Last 1 Encounters:   10/30/19 1.721 m (5' 7.75\")      Wt Readings from Last 1 Encounters:   10/30/19 66.7 kg (147 lb)    Estimated body mass index is 22.52 kg/m  as calculated from the following:    Height as of this encounter: 1.721 m (5' 7.75\").    Weight as of this encounter: 66.7 kg (147 lb).     LDA:  Peripheral IV 10/30/19 Left Hand (Active)   Site Assessment WDL 10/30/2019 12:54 PM   Line Status Infusing 10/30/2019 12:54 PM   Phlebitis Scale 0-->no symptoms 10/30/2019 12:54 PM   Infiltration Scale 0 10/30/2019 12:54 PM   Extravasation? No 10/30/2019 12:54 PM   Number of days: 0       Airway - Adult/Peds laryngeal mask airway (Active)   Number of days: 0        Assessment:   ASA SCORE: 3    H&P: History and physical reviewed and following examination; no interval change.   Smoking Status:  Non-Smoker/Unknown   NPO Status: NPO Appropriate     Plan:   Anes. Type:  General   Pre-Medication: None   Induction:  IV (Standard)   Airway: LMA   Access/Monitoring: PIV   Maintenance: Balanced     Postop Plan:   Postop " Pain: Opioids  Postop Sedation/Airway: Not planned  Disposition: Outpatient     PONV Management:   Adult Risk Factors:, Non-Smoker, Postop Opioids   Prevention: Ondansetron     CONSENT: Direct conversation   Plan and risks discussed with: Patient                      Boni Shah MD

## 2019-10-30 NOTE — ANESTHESIA POSTPROCEDURE EVALUATION
Anesthesia POST Procedure Evaluation    Patient: Richie Gordillo   MRN:     [id number] Gender:   male   Age:    78 year old :      1940        Preoperative Diagnosis: Blind Painful Right Eye   Procedure(s):  Evisceration Right Eye, Tarsorrhaphy   Postop Comments: No value filed.       Anesthesia Type:  Not documented  General    Reportable Event: NO     PAIN: Uncomplicated   Sign Out status: Comfortable, Well controlled pain     PONV: No PONV   Sign Out status:  No Nausea or Vomiting     Neuro/Psych: Uneventful perioperative course   Sign Out Status: Preoperative baseline; Age appropriate mentation     Airway/Resp.: Uneventful perioperative course   Sign Out Status: Resp. Status within EXPECTED Parameters     CV: Uneventful perioperative course   Sign Out status: Appropriate BP and perfusion indices; Appropriate HR/Rhythm     Disposition:   Sign Out in:  PACU  Disposition:  Phase II; Home  Recovery Course: Uneventful  Follow-Up: Not required           Last Anesthesia Record Vitals:  [facility] VITALS  10/30/2019 1531 - 10/30/2019 1631      10/30/2019             Temp:  36.4  C (97.5  F)          Last PACU Vitals:  Vitals Value Taken Time   /68 10/30/2019  4:15 PM   Temp 36.6  C (97.9  F) 10/30/2019  4:15 PM   Pulse 65 10/30/2019  4:15 PM   Resp 14 10/30/2019  4:20 PM   SpO2 89 % 10/30/2019  4:20 PM   Temp src     NIBP     Pulse     SpO2     Resp     Temp 36.4  C (97.5  F) 10/30/2019  4:06 PM   Ht Rate     Temp 2     Vitals shown include unvalidated device data.      Electronically Signed By: Bonilla Vu MD, 2019, 5:42 PM

## 2019-10-30 NOTE — ANESTHESIA CARE TRANSFER NOTE
Patient: Richie Gordillo    Procedure(s):  Evisceration Right Eye, Tarsorrhaphy    Diagnosis: Blind Painful Right Eye  Diagnosis Additional Information: No value filed.    Anesthesia Type:   General     Note:  Airway :Face Mask  Patient transferred to:PACU  Comments: Uneventful transport to PACU; VSS; Report given to RN; Pt comfortable; IV patent: pt exchanging wellHandoff Report: Identifed the Patient, Identified the Reponsible Provider, Reviewed the pertinent medical history, Discussed the surgical course, Reviewed Intra-OP anesthesia mangement and issues during anesthesia, Set expectations for post-procedure period and Allowed opportunity for questions and acknowledgement of understanding      Vitals: (Last set prior to Anesthesia Care Transfer)    CR[facility] VITALS  10/30/2019 1531 - 10/30/2019 1605      10/30/2019             Pulse:  64    SpO2:  100 %    Resp Rate (observed):  (!) 5    Resp Rate (set):  10                Electronically Signed By: MEGAN Menjivar CRNA  October 30, 2019  4:05 PM

## 2019-10-30 NOTE — DISCHARGE INSTRUCTIONS
[facility]  Home Care Following Anesthesia  For 24 hours after surgery:  1. Get plenty of rest.  A responsible adult must stay with you for at least 24 hours after you leave the surgery center.  2. Do not drive or use heavy equipment.  If you have weakness or tingling, don't drive or use heavy equipment until this feeling goes away.   3. Do not drink alcohol.   4. Avoid strenuous or risky activities.  Ask for help when climbing stairs.  5. You may feel lightheaded.  IF so, sit for a few minutes before standing.  Have someone help you get up.   6. If you have nausea (feel sick to your stomach): Drink only clear liquids such as apple juice, ginger ale, broth or 7-Up.  Rest may also help.  Be sure to drink enough fluids.  Move to a regular diet as you feel able.   7. You may have a slight fever.  Call the doctor if your fever is over 100 F (37.7 C) (taken under the tongue) or lasts longer than 24 hours.  8. You may have a dry mouth, a sore throat, muscle aches or trouble sleeping. These should go away after 24 hours.  9. Do not make important or legal decisions.             Tips for taking pain medications  To get the best pain relief possible, remember these points:    Take pain medications as directed, before pain becomes severe.    Pain medication can upset your stomach: taking it with food may help.    Constipation is a common side effect of pain medication. Drink plenty of  fluids.    Eat foods high in fiber. Take a stool softener if recommended by your doctor or pharmacist.    Do not drink alcohol, drive or operate machinery while taking pain medications.    Ask about other ways to control pain, such as with heat, ice or relaxation.    Tylenol/Acetaminophen Consumption  To help encourage the safe use of acetaminophen, the makers of TYLENOL  have lowered the maximum daily dose for single-ingredient Extra Strength TYLENOL  (acetaminophen) products sold in the [place] from 8 pills  per day (4,000 mg) to 6 pills per day (3,000 mg). The dosing interval has also changed from 2 pills every 4-6 hours to 2 pills every 6 hours.    If you feel your pain relief is insufficient, you may take Tylenol/Acetaminophen in addition to your narcotic pain medication.     Be careful not to exceed 3,000 mg of Tylenol/Acetaminophen in a 24 hour period from all sources.    If you are taking extra strength Tylenol/acetaminophen (500 mg), the maximum dose is 6 tablets in 24 hours.    If you are taking regular strength acetaminophen (325 mg), the maximum dose is 9 tablets in 24 hours.    Tylenol 975mg given at 12:47am. Next dose available after 6:50pm. Then follow package instructions.     Call a doctor for any of the followin. Signs of infection (fever, growing tenderness at the surgery site, a large amount of drainage or bleeding, severe pain, foul-smelling drainage, redness, swelling).  2. It has been over 8 to 10 hours since surgery and you are still not able to urinate (pass water).  3. Headache for over 24 hours.    Your doctor is:  Dr. Moe Mims, Ophthalmology: [phone]                  Or dial [phone] and ask for the resident on call for:  Ophthalmology  For emergency care, call the:  [place] [facility]:  [phone] (TTY for hearing impaired: [phone])          Post-operative Instructions    Ophthalmic Plastic and Reconstructive Surgery  Moe Mims M.D.  Oliva Martin M.D.    All instructions apply to the operated eye(s) or eyelid(s)    What to expect after surgery:    There will be some swelling, bruising, and likely a black eye (even into the lower eyelids and cheeks). Also expect crusting and discharge from the eye and/or incisions.     A small amount of surface bleeding is normal for the first 48 hours after surgery.    You may notice some bloody tears for the first few days after surgery. This is normal.    Your eye(s) and eyelid(s) may be painful and  tender. This is normal after surgery. Use the pain medication as prescribed. If your pain does not improve despite the medication, contact the office.    Wound care and personal care:    If a patch or bandage has been placed, please leave this in place until seen in clinic. Prevent the bandage from getting wet.     Apply ice compresses 15 minutes on 15 minutes off while awake for the first 2 days after surgery, then switch to warm compresses 4 times a day until seen by your physician.     For warm packs you can place a cup of dry uncooked rice in a clean cotton sock. Place sock in microwave 30 seconds to one minute. Next place the warm sock into a plastic bag and wrap the bag with clean warm wet washcloth and place over operated eye.      You may shower or wash your hair the day after surgery. Do not bathe or go swimming for 1 week to prevent contamination of your wounds.    Do not apply make-up to the eyes or eyelids for 2 weeks after surgery.    Activity restrictions and driving:    Avoid heavy lifting, bending, exercise or strenuous activity for 1 week after surgery.    You may resume other activities and return to work as tolerated.    You may not resume driving until have you stopped using narcotic pain medications(such as Norco, Percocet, Tylenol #3).    Sinus Precautions for 1 week: Sneeze with mouth open. Cough with mouth open. No blowing nose. No straws. No bending over.    Medications:    Restart all your regular home medications and eye drops today. If you take Plavix or Aspirin on a regular basis, wait for 3 days after your surgery before restarting these in order to decrease the risk of bleeding complications.    Avoid aspirin and aspirin-like medications (Motrin, Aleve, Ibuprofen, [place] etc) for 5 days to reduce the risk of bleeding. You may take Tylenol (acetaminophen) for pain.    In addition to your home medications, take the following post-operative medications as prescribed by your  physician:    Apply antibiotic ointment (erythromycin) into the operated eye(s) at night.     Instill eye drops (Maxitrol) four times a day for 10 days.     Take antibiotic tablets as prescribed.    Take 1 to 2 pain pills (norco or oxycodone as prescribed) as needed for pain up to every 4 hours.    The pain pills may make you drowsy. You must not drive a car, operate heavy machinery or drink alcohol while taking them.    The pain pills may cause constipation and nausea. Take them with some food to prevent a stomach upset. If you continue to experience nausea, call your physician.      WARNING: All the prescription pain medications listed above contain Tylenol (acetaminophen). You must not take more than 4,000 mg of acetaminophen per 24-hour period. This is equivalent to 6 tablets of Darvocet, 8 tablets of Vicodin, or 12 tablets of Norco, Percocet or Tylenol #3. If you take other over-the-counter medications containing acetaminophen, you must take the amount of acetaminophen into account and reduce the number of prescribed pain pills accordingly.    Contact information and follow-up:    Return to the Eye Clinic for a follow-up appointment with your physician as  scheduled. If no appointment has been scheduled, call [phone] for an  appointment with Dr. Mims within 1 to 2 weeks from your date of surgery.      For severe pain, bleeding, or loss of vision, call the Eye Clinic at [phone].    After hours or on weekends and holidays, call [phone] and ask to speak with the ophthalmologist on call.

## 2019-10-30 NOTE — BRIEF OP NOTE
[facility] Brief Operative Note    Pre-operative diagnosis: Blind Painful Right Eye   Post-operative diagnosis Same   Procedure: Procedure(s):  Evisceration Right Eye, Tarsorrhaphy   Surgeon(s): Surgeon(s) and Role:     * Moe Mims MD - Primary     * Oliva Martin MD - Fellow - Assisting   Estimated blood loss: 1 mL    Specimens: ID Type Source Tests Collected by Time Destination   A : Right Eye Contents Tissue Eye, Right SURGICAL PATHOLOGY EXAM Moe Mims MD 10/30/2019  3:15 PM       Findings: Blind painful right eye     Oliva Martin MD  Oculoplastic Surgery Fellow

## 2019-10-31 ENCOUNTER — TELEPHONE (OUTPATIENT)
Dept: OPHTHALMOLOGY | Facility: CLINIC | Age: 79
End: 2019-10-31

## 2019-11-04 ENCOUNTER — OFFICE VISIT (OUTPATIENT)
Dept: OPHTHALMOLOGY | Facility: CLINIC | Age: 79
End: 2019-11-04
Payer: COMMERCIAL

## 2019-11-04 DIAGNOSIS — Z98.890 POSTOPERATIVE EYE STATE: Primary | ICD-10-CM

## 2019-11-04 LAB — COPATH REPORT: NORMAL

## 2019-11-04 ASSESSMENT — SLIT LAMP EXAM - LIDS: COMMENTS: TARSO

## 2019-11-04 ASSESSMENT — TONOMETRY
OS_IOP_MMHG: 6
OD_IOP_MMHG: XXX
IOP_METHOD: ICARE

## 2019-11-04 ASSESSMENT — EXTERNAL EXAM - RIGHT EYE: OD_EXAM: PERIORBITAL EDEMA

## 2019-11-04 ASSESSMENT — VISUAL ACUITY
OD_CC: XXX
OS_CC+: -2
METHOD: SNELLEN - LINEAR
OS_CC: 20/25

## 2019-11-04 NOTE — PROGRESS NOTES
Chief Complaints and History of Present Illnesses   Patient presents with     Post Op (Ophthalmology) Right Eye     Chief Complaint(s) and History of Present Illness(es)     Post Op (Ophthalmology) Right Eye     In right eye.  This started days ago.  Occurring constantly.  Since onset   it is stable.  Treatments tried include no treatments.  Pain was noted as   0/10.              Comments     5 day postop s/p Evisceration of right  with placement of a 18  mm    silicone implant on 10/30/2019. Pt states no pain. Maybe a headache.     Radha Kurtz, COT COT 9:19 AM November 4, 2019                      Assessment & Plan     Richie Gordillo is a 79 year old male with the following diagnoses:   1. Postoperative eye state       Healing well    PLAN  Warm soaks  Maxitrol twice a day  Ointment twice a day  Return to clinic 4-6 weeks          Attending Physician Attestation:  Complete documentation of historical and exam elements from today's encounter can be found in the full encounter summary report (not reduplicated in this progress note).  I personally obtained the chief complaint(s) and history of present illness.  I confirmed and edited as necessary the review of systems, past medical/surgical history, family history, social history, and examination findings as documented by others; and I examined the patient myself.  I personally reviewed the relevant tests, images, and reports as documented above.  I formulated and edited as necessary the assessment and plan and discussed the findings and management plan with the patient and family. I personally reviewed the ophthalmic test(s) associated with this encounter, agree with the interpretation(s) as documented and have edited the corresponding report(s) as necessary.   -Moe Mims MD  9:38 AM 11/4/2019

## 2019-11-04 NOTE — NURSING NOTE
Chief Complaints and History of Present Illnesses   Patient presents with     Post Op (Ophthalmology) Right Eye     Chief Complaint(s) and History of Present Illness(es)     Post Op (Ophthalmology) Right Eye     Laterality: right eye    Onset: days ago    Frequency: constantly    Course: stable    Treatments tried: no treatments    Pain scale: 0/10              Comments     5 day postop s/p Evisceration of right  with placement of a 18  mm  silicone implant on 10/30/2019. Pt states no pain. Maybe a headache.     Radha Kurtz, COT COT 9:19 AM November 4, 2019

## 2019-11-12 ENCOUNTER — TELEPHONE (OUTPATIENT)
Dept: OPHTHALMOLOGY | Facility: CLINIC | Age: 79
End: 2019-11-12

## 2019-11-12 NOTE — TELEPHONE ENCOUNTER
RandRichie [phone]    Right eye evisceration 10-30-19 with tars  Monday AM woke up and patch had blood on-- not a lot  Today 3 times had about a drop come out of surgical eye-  Bright red per family  No pain  No warmth  Maybe some mild new swelling under lid    Reviewed with oculo-plastics-- tarsorrhaphy possibly loosening  Ok to monitior and if still occurring by Friday to come in    Reviewed if continuously bleeds to place pressure and [facility]    Pt to call for any new eye pain/swelling/warmth    May cancel Friday's appt if symptoms resolved  Delano Carpio RN  1:11 PM 11/12/19              [facility]    Phone Message    May a detailed message be left on voicemail: yes    Reason for Call: Other: Pt called and said that he is experiencing some bleeding.  It just started last night.  It's not a big amount but pt wanted to know if that is normal.  Pt also wanted to know if the stitches are suppose to dissolve by themself.  Please call the pt to discuss this. Thanks     Action Taken: Message routed to:  [facility] ([facility] eye clinic

## 2019-11-15 ENCOUNTER — OFFICE VISIT (OUTPATIENT)
Dept: OPHTHALMOLOGY | Facility: CLINIC | Age: 79
End: 2019-11-15
Payer: COMMERCIAL

## 2019-11-15 DIAGNOSIS — Z98.890 POSTOPERATIVE EYE STATE: Primary | ICD-10-CM

## 2019-11-15 ASSESSMENT — EXTERNAL EXAM - RIGHT EYE: OD_EXAM: NORMAL

## 2019-11-15 ASSESSMENT — VISUAL ACUITY
OS_CC+: -2
CORRECTION_TYPE: GLASSES
METHOD: SNELLEN - LINEAR
OS_PH_CC: 20/30
OD_CC: XX
OS_CC: 20/40

## 2019-11-15 ASSESSMENT — TONOMETRY
OS_IOP_MMHG: 10
OD_IOP_MMHG: XX
IOP_METHOD: ICARE

## 2019-11-15 ASSESSMENT — SLIT LAMP EXAM - LIDS: COMMENTS: TARSO

## 2019-11-15 NOTE — NURSING NOTE
Chief Complaints and History of Present Illnesses   Patient presents with     Post Op (Ophthalmology) Right Eye     Chief Complaint(s) and History of Present Illness(es)     Post Op (Ophthalmology) Right Eye     Laterality: right eye    Onset: gradual    Onset: months ago    Frequency: constantly    Pain scale: 0/10              Comments     s/p Evisceration of right  with placement of a 18  mm  silicone implant on 10/30/2019.  Had some bleeding that started on Sunday and ended on Thursday.  Swelling started again after the bleeding had started  +discharge  +clear liquid occ  Carissa Adair COT 8:32 AM November 15, 2019

## 2019-11-15 NOTE — PROGRESS NOTES
Chief Complaints and History of Present Illnesses   Patient presents with     Post Op (Ophthalmology) Right Eye     Chief Complaint(s) and History of Present Illness(es)     Post Op (Ophthalmology) Right Eye     In right eye.  Onset was gradual.  This started months ago.  Occurring   constantly.  Pain was noted as 0/10.       Comments     s/p Evisceration of right  with placement of a 18  mm  silicone implant   on 10/30/2019.  Had some bleeding that started on Sunday and ended on Thursday.  Swelling started again after the bleeding had started  +discharge  +clear liquid occ  Carissa Adair COT 8:32 AM November 15, 2019      -Using erythro ointment at bedtime right eye   -Bleeding episodes occurred at night   -Few drips of blood during the day         Assessment & Plan     Richie Gordillo is a 79 year old male with the following diagnoses:   1. Postoperative eye state       -Patient on plavix and has had a few episodes of bleeding from operative eye. Unclear if source is lid or eye  -Conformer in place, conj well-covered, no sign of infection    -Exam reassuring, return precautions discussed  -Discussed use direct pressure to stop episodes of bleeding if recur at home    -F/u as scheduled 12/9  -schedule  appt end of dec/beginning of Jan               Oliva Martin MD  Oculoplastics Fellow    Attending Physician Attestation:  Complete documentation of historical and exam elements from today's encounter can be found in the full encounter summary report (not reduplicated in this progress note).  I personally obtained the chief complaint(s) and history of present illness.  I confirmed and edited as necessary the review of systems, past medical/surgical history, family history, social history, and examination findings as documented by others; and I examined the patient myself.  I personally reviewed the relevant tests, images, and reports as documented above.  I formulated and edited as necessary the  assessment and plan and discussed the findings and management plan with the patient and family. I personally reviewed the ophthalmic test(s) associated with this encounter, agree with the interpretation(s) as documented by the resident/fellow, and have edited the corresponding report(s) as necessary.   -Moe Mims MD

## 2019-11-18 ENCOUNTER — COMMUNICATION - HEALTHEAST (OUTPATIENT)
Dept: INTERNAL MEDICINE | Facility: CLINIC | Age: 79
End: 2019-11-18

## 2019-11-18 ENCOUNTER — DOCUMENTATION ONLY (OUTPATIENT)
Dept: CARE COORDINATION | Facility: CLINIC | Age: 79
End: 2019-11-18

## 2019-11-18 DIAGNOSIS — I10 HYPERTENSION: ICD-10-CM

## 2019-12-02 ENCOUNTER — OFFICE VISIT - HEALTHEAST (OUTPATIENT)
Dept: INTERNAL MEDICINE | Facility: CLINIC | Age: 79
End: 2019-12-02

## 2019-12-02 DIAGNOSIS — Z86.73 HISTORY OF STROKE: ICD-10-CM

## 2019-12-02 DIAGNOSIS — I10 ESSENTIAL HYPERTENSION: ICD-10-CM

## 2019-12-02 DIAGNOSIS — J43.9 PULMONARY EMPHYSEMA, UNSPECIFIED EMPHYSEMA TYPE (H): ICD-10-CM

## 2019-12-02 DIAGNOSIS — I65.23 CAROTID ARTERY STENOSIS, ASYMPTOMATIC, BILATERAL: ICD-10-CM

## 2019-12-02 DIAGNOSIS — Z86.0100 HISTORY OF COLONIC POLYPS: ICD-10-CM

## 2019-12-02 DIAGNOSIS — N40.0 BENIGN PROSTATIC HYPERPLASIA WITHOUT LOWER URINARY TRACT SYMPTOMS: ICD-10-CM

## 2019-12-02 DIAGNOSIS — Z51.81 MEDICATION MONITORING ENCOUNTER: ICD-10-CM

## 2019-12-02 DIAGNOSIS — L72.3 SEBACEOUS CYST: ICD-10-CM

## 2019-12-02 DIAGNOSIS — N18.9 CHRONIC KIDNEY DISEASE, UNSPECIFIED CKD STAGE: ICD-10-CM

## 2019-12-02 DIAGNOSIS — I35.0 AORTIC STENOSIS, MODERATE: ICD-10-CM

## 2019-12-02 DIAGNOSIS — I48.91 ATRIAL FIBRILLATION WITH RVR (H): ICD-10-CM

## 2019-12-02 DIAGNOSIS — R00.0 TACHYCARDIA: ICD-10-CM

## 2019-12-02 LAB
ATRIAL RATE - MUSE: 66 BPM
DIASTOLIC BLOOD PRESSURE - MUSE: NORMAL
INTERPRETATION ECG - MUSE: NORMAL
P AXIS - MUSE: NORMAL
PR INTERVAL - MUSE: NORMAL
QRS DURATION - MUSE: 80 MS
QT - MUSE: 310 MS
QTC - MUSE: 452 MS
R AXIS - MUSE: 26 DEGREES
SYSTOLIC BLOOD PRESSURE - MUSE: NORMAL
T AXIS - MUSE: 69 DEGREES
VENTRICULAR RATE- MUSE: 128 BPM

## 2019-12-06 ENCOUNTER — OFFICE VISIT - HEALTHEAST (OUTPATIENT)
Dept: INTERNAL MEDICINE | Facility: CLINIC | Age: 79
End: 2019-12-06

## 2019-12-06 DIAGNOSIS — I48.91 ATRIAL FIBRILLATION WITH RAPID VENTRICULAR RESPONSE (H): ICD-10-CM

## 2019-12-06 DIAGNOSIS — I50.21 ACUTE SYSTOLIC HEART FAILURE (H): ICD-10-CM

## 2019-12-06 LAB
ANION GAP SERPL CALCULATED.3IONS-SCNC: 8 MMOL/L (ref 5–18)
BNP SERPL-MCNC: 1399 PG/ML (ref 0–80)
BUN SERPL-MCNC: 24 MG/DL (ref 8–28)
CALCIUM SERPL-MCNC: 8.4 MG/DL (ref 8.5–10.5)
CHLORIDE BLD-SCNC: 97 MMOL/L (ref 98–107)
CO2 SERPL-SCNC: 32 MMOL/L (ref 22–31)
CREAT SERPL-MCNC: 1.75 MG/DL (ref 0.7–1.3)
DIGOXIN LEVEL LHE- HISTORICAL: 0.5 NG/ML (ref 0.5–2)
GFR SERPL CREATININE-BSD FRML MDRD: 38 ML/MIN/1.73M2
GLUCOSE BLD-MCNC: 123 MG/DL (ref 70–125)
MAGNESIUM SERPL-MCNC: 2.1 MG/DL (ref 1.8–2.6)
POTASSIUM BLD-SCNC: 4.1 MMOL/L (ref 3.5–5)
SODIUM SERPL-SCNC: 137 MMOL/L (ref 136–145)

## 2019-12-09 ENCOUNTER — OFFICE VISIT (OUTPATIENT)
Dept: OPHTHALMOLOGY | Facility: CLINIC | Age: 79
End: 2019-12-09
Payer: COMMERCIAL

## 2019-12-09 DIAGNOSIS — Z98.890 POSTOPERATIVE EYE STATE: Primary | ICD-10-CM

## 2019-12-09 RX ORDER — DILTIAZEM HYDROCHLORIDE 120 MG/1
120 CAPSULE, COATED, EXTENDED RELEASE ORAL
COMMUNITY
Start: 2019-12-06 | End: 2021-01-01

## 2019-12-09 RX ORDER — FUROSEMIDE 40 MG
40 TABLET ORAL DAILY
Status: ON HOLD | COMMUNITY
Start: 2019-12-06 | End: 2021-01-01

## 2019-12-09 RX ORDER — DIGOXIN 125 MCG
125 TABLET ORAL
COMMUNITY
Start: 2019-12-05 | End: 2021-01-01

## 2019-12-09 ASSESSMENT — VISUAL ACUITY
METHOD: SNELLEN - LINEAR
CORRECTION_TYPE: GLASSES
OS_CC+: -1
OS_CC: 20/30
OD_CC: PROS

## 2019-12-09 ASSESSMENT — TONOMETRY
OS_IOP_MMHG: 7
OD_IOP_MMHG: PROS
IOP_METHOD: ICARE

## 2019-12-09 ASSESSMENT — EXTERNAL EXAM - RIGHT EYE: OD_EXAM: NORMAL

## 2019-12-09 NOTE — NURSING NOTE
Chief Complaints and History of Present Illnesses   Patient presents with     Post Op (Ophthalmology) Right Eye     s/p Evisceration of right  with placement of a 18  mm  silicone implant   on 10/30/2019.     Chief Complaint(s) and History of Present Illness(es)     Post Op (Ophthalmology) Right Eye     Laterality: right eye    Onset: weeks ago    Frequency: constantly    Course: stable    Treatments tried: no treatments    Pain scale: 0/10    Comments: s/p Evisceration of right  with placement of a 18  mm  silicone implant   on 10/30/2019.              Comments     s/p Evisceration of right  with placement of a 18  mm  silicone implant on 10/30/2019. Patient is doing well since surgery. No eye pain. Finished with eye drops.    Roxanne Dsouza COT 10:34 AM December 9, 2019

## 2019-12-09 NOTE — PROGRESS NOTES
Chief Complaints and History of Present Illnesses   Patient presents with     Post Op (Ophthalmology) Right Eye     s/p Evisceration of right  with placement of a 18  mm  silicone implant   on 10/30/2019.     Chief Complaint(s) and History of Present Illness(es)     Post Op (Ophthalmology) Right Eye     In right eye.  This started weeks ago.  Occurring constantly.  Since   onset it is stable.  Treatments tried include no treatments.  Pain was   noted as 0/10. Additional comments: s/p Evisceration of right  with   placement of a 18  mm  silicone implant   on 10/30/2019.       Comments     s/p Evisceration of right  with placement of a 18  mm  silicone implant   on 10/30/2019. Patient is doing well since surgery. No eye pain. Finished   with eye drops.    Roxanne Dsouza COT 10:34 AM December 9, 2019     -Was hospitalized last week with new onset a fib but now is doing well  -Eye has been stable. No pain or discharge. No bleeding. Conformer in place  -Doesn't have appointment with  yet  -Wearing glasses         Assessment & Plan     Richie Gordillo is a 79 year old male with the following diagnoses:   1. Postoperative eye state       Schedule appt with STEPHEN   Return to clinic in 2 months          Oliva Martin MD  Oculoplastics Fellow  Attending Physician Attestation:  Complete documentation of historical and exam elements from today's encounter can be found in the full encounter summary report (not reduplicated in this progress note).  I personally obtained the chief complaint(s) and history of present illness.  I confirmed and edited as necessary the review of systems, past medical/surgical history, family history, social history, and examination findings as documented by others; and I examined the patient myself.  I personally reviewed the relevant tests, images, and reports as documented above.  I formulated and edited as necessary the assessment and plan and discussed the findings and management  plan with the patient and family. I personally reviewed the ophthalmic test(s) associated with this encounter, agree with the interpretation(s) as documented by the resident/fellow, and have edited the corresponding report(s) as necessary.   -Moe Mims MD

## 2019-12-17 ENCOUNTER — OFFICE VISIT - HEALTHEAST (OUTPATIENT)
Dept: CARDIOLOGY | Facility: CLINIC | Age: 79
End: 2019-12-17

## 2019-12-17 ENCOUNTER — AMBULATORY - HEALTHEAST (OUTPATIENT)
Dept: CARDIOLOGY | Facility: CLINIC | Age: 79
End: 2019-12-17

## 2019-12-17 DIAGNOSIS — I48.91 ATRIAL FIBRILLATION WITH RAPID VENTRICULAR RESPONSE (H): ICD-10-CM

## 2019-12-17 DIAGNOSIS — I50.32 CHRONIC HEART FAILURE WITH PRESERVED EJECTION FRACTION (H): ICD-10-CM

## 2019-12-17 DIAGNOSIS — I10 ESSENTIAL HYPERTENSION: ICD-10-CM

## 2019-12-17 DIAGNOSIS — N18.30 CHRONIC KIDNEY DISEASE, STAGE III (MODERATE) (H): ICD-10-CM

## 2019-12-17 DIAGNOSIS — I50.31 ACUTE DIASTOLIC CONGESTIVE HEART FAILURE (H): ICD-10-CM

## 2019-12-17 ASSESSMENT — MIFFLIN-ST. JEOR: SCORE: 1339.17

## 2020-01-01 ENCOUNTER — COMMUNICATION - HEALTHEAST (OUTPATIENT)
Dept: INTERNAL MEDICINE | Facility: CLINIC | Age: 80
End: 2020-01-01

## 2020-01-01 ENCOUNTER — COMMUNICATION - HEALTHEAST (OUTPATIENT)
Dept: CARDIOLOGY | Facility: CLINIC | Age: 80
End: 2020-01-01

## 2020-01-01 ENCOUNTER — COMMUNICATION - HEALTHEAST (OUTPATIENT)
Dept: FAMILY MEDICINE | Facility: CLINIC | Age: 80
End: 2020-01-01

## 2020-01-01 ENCOUNTER — OFFICE VISIT - HEALTHEAST (OUTPATIENT)
Dept: CARDIOLOGY | Facility: CLINIC | Age: 80
End: 2020-01-01

## 2020-01-01 ENCOUNTER — COMMUNICATION - HEALTHEAST (OUTPATIENT)
Dept: ADMINISTRATIVE | Facility: CLINIC | Age: 80
End: 2020-01-01

## 2020-01-01 DIAGNOSIS — I50.32 CHRONIC HEART FAILURE WITH PRESERVED EJECTION FRACTION (H): ICD-10-CM

## 2020-01-01 DIAGNOSIS — N18.31 STAGE 3A CHRONIC KIDNEY DISEASE (H): ICD-10-CM

## 2020-01-01 DIAGNOSIS — J44.9 COPD (CHRONIC OBSTRUCTIVE PULMONARY DISEASE) (H): ICD-10-CM

## 2020-01-01 DIAGNOSIS — I50.31 ACUTE DIASTOLIC CONGESTIVE HEART FAILURE (H): ICD-10-CM

## 2020-01-01 DIAGNOSIS — I10 ESSENTIAL HYPERTENSION: ICD-10-CM

## 2020-01-01 DIAGNOSIS — I48.91 A-FIB (H): ICD-10-CM

## 2020-01-01 LAB
ATRIAL RATE - MUSE: 59 BPM
DIASTOLIC BLOOD PRESSURE - MUSE: NORMAL
INTERPRETATION ECG - MUSE: NORMAL
P AXIS - MUSE: 90 DEGREES
PR INTERVAL - MUSE: 214 MS
QRS DURATION - MUSE: 86 MS
QT - MUSE: 456 MS
QTC - MUSE: 451 MS
R AXIS - MUSE: 50 DEGREES
SYSTOLIC BLOOD PRESSURE - MUSE: NORMAL
T AXIS - MUSE: 82 DEGREES
VENTRICULAR RATE- MUSE: 59 BPM

## 2020-01-01 ASSESSMENT — MIFFLIN-ST. JEOR: SCORE: 1306.86

## 2020-01-08 ENCOUNTER — OFFICE VISIT - HEALTHEAST (OUTPATIENT)
Dept: CARDIOLOGY | Facility: CLINIC | Age: 80
End: 2020-01-08

## 2020-01-08 DIAGNOSIS — G47.33 OBSTRUCTIVE SLEEP APNEA (ADULT) (PEDIATRIC): ICD-10-CM

## 2020-01-08 DIAGNOSIS — I48.19 PERSISTENT ATRIAL FIBRILLATION (H): ICD-10-CM

## 2020-01-08 DIAGNOSIS — I10 ESSENTIAL HYPERTENSION: ICD-10-CM

## 2020-01-08 DIAGNOSIS — I50.32 CHRONIC HEART FAILURE WITH PRESERVED EJECTION FRACTION (H): ICD-10-CM

## 2020-01-08 ASSESSMENT — MIFFLIN-ST. JEOR: SCORE: 1380

## 2020-01-09 ENCOUNTER — TELEPHONE (OUTPATIENT)
Dept: OPHTHALMOLOGY | Facility: CLINIC | Age: 80
End: 2020-01-09

## 2020-01-09 ENCOUNTER — AMBULATORY - HEALTHEAST (OUTPATIENT)
Dept: CARDIOLOGY | Facility: CLINIC | Age: 80
End: 2020-01-09

## 2020-01-13 ENCOUNTER — RECORDS - HEALTHEAST (OUTPATIENT)
Dept: ADMINISTRATIVE | Facility: OTHER | Age: 80
End: 2020-01-13

## 2020-01-13 ENCOUNTER — TELEPHONE (OUTPATIENT)
Dept: OPHTHALMOLOGY | Facility: CLINIC | Age: 80
End: 2020-01-13

## 2020-01-14 ENCOUNTER — RECORDS - HEALTHEAST (OUTPATIENT)
Dept: ADMINISTRATIVE | Facility: OTHER | Age: 80
End: 2020-01-14

## 2020-01-15 ENCOUNTER — COMMUNICATION - HEALTHEAST (OUTPATIENT)
Dept: INTERNAL MEDICINE | Facility: CLINIC | Age: 80
End: 2020-01-15

## 2020-01-15 DIAGNOSIS — I10 HYPERTENSION: ICD-10-CM

## 2020-01-21 ENCOUNTER — MEDICAL CORRESPONDENCE (OUTPATIENT)
Dept: HEALTH INFORMATION MANAGEMENT | Facility: CLINIC | Age: 80
End: 2020-01-21

## 2020-01-23 ENCOUNTER — AMBULATORY - HEALTHEAST (OUTPATIENT)
Dept: CARDIOLOGY | Facility: CLINIC | Age: 80
End: 2020-01-23

## 2020-01-24 ENCOUNTER — OFFICE VISIT - HEALTHEAST (OUTPATIENT)
Dept: INTERNAL MEDICINE | Facility: CLINIC | Age: 80
End: 2020-01-24

## 2020-01-24 DIAGNOSIS — I65.23 CAROTID ARTERY STENOSIS, ASYMPTOMATIC, BILATERAL: ICD-10-CM

## 2020-01-24 DIAGNOSIS — Z51.81 ENCOUNTER FOR THERAPEUTIC DRUG MONITORING: ICD-10-CM

## 2020-01-24 DIAGNOSIS — Z86.0100 HISTORY OF COLONIC POLYPS: ICD-10-CM

## 2020-01-24 DIAGNOSIS — Z86.73 HISTORY OF STROKE: ICD-10-CM

## 2020-01-24 DIAGNOSIS — I35.0 AORTIC STENOSIS, MODERATE: ICD-10-CM

## 2020-01-24 DIAGNOSIS — J43.9 PULMONARY EMPHYSEMA, UNSPECIFIED EMPHYSEMA TYPE (H): ICD-10-CM

## 2020-01-24 DIAGNOSIS — L72.3 SEBACEOUS CYST: ICD-10-CM

## 2020-01-24 DIAGNOSIS — N40.0 BENIGN PROSTATIC HYPERPLASIA WITHOUT LOWER URINARY TRACT SYMPTOMS: ICD-10-CM

## 2020-01-24 DIAGNOSIS — I48.92 ATRIAL FLUTTER, UNSPECIFIED TYPE (H): ICD-10-CM

## 2020-01-24 DIAGNOSIS — N18.9 CHRONIC KIDNEY DISEASE, UNSPECIFIED CKD STAGE: ICD-10-CM

## 2020-01-24 DIAGNOSIS — I10 ESSENTIAL HYPERTENSION: ICD-10-CM

## 2020-01-24 DIAGNOSIS — Z87.891 HISTORY OF TOBACCO USE: ICD-10-CM

## 2020-01-24 LAB
ANION GAP SERPL CALCULATED.3IONS-SCNC: 11 MMOL/L (ref 5–18)
BUN SERPL-MCNC: 28 MG/DL (ref 8–28)
CALCIUM SERPL-MCNC: 9.5 MG/DL (ref 8.5–10.5)
CHLORIDE BLD-SCNC: 100 MMOL/L (ref 98–107)
CO2 SERPL-SCNC: 32 MMOL/L (ref 22–31)
CREAT SERPL-MCNC: 1.99 MG/DL (ref 0.7–1.3)
GFR SERPL CREATININE-BSD FRML MDRD: 33 ML/MIN/1.73M2
GLUCOSE BLD-MCNC: 110 MG/DL (ref 70–125)
MAGNESIUM SERPL-MCNC: 2 MG/DL (ref 1.8–2.6)
POTASSIUM BLD-SCNC: 4.6 MMOL/L (ref 3.5–5)
SODIUM SERPL-SCNC: 143 MMOL/L (ref 136–145)

## 2020-01-24 ASSESSMENT — PATIENT HEALTH QUESTIONNAIRE - PHQ9: SUM OF ALL RESPONSES TO PHQ QUESTIONS 1-9: 6

## 2020-01-26 ENCOUNTER — COMMUNICATION - HEALTHEAST (OUTPATIENT)
Dept: SCHEDULING | Facility: CLINIC | Age: 80
End: 2020-01-26

## 2020-01-26 DIAGNOSIS — I10 HYPERTENSION: ICD-10-CM

## 2020-01-27 ENCOUNTER — RECORDS - HEALTHEAST (OUTPATIENT)
Dept: ADMINISTRATIVE | Facility: OTHER | Age: 80
End: 2020-01-27

## 2020-01-27 ENCOUNTER — COMMUNICATION - HEALTHEAST (OUTPATIENT)
Dept: INTERNAL MEDICINE | Facility: CLINIC | Age: 80
End: 2020-01-27

## 2020-01-30 ENCOUNTER — ANESTHESIA - HEALTHEAST (OUTPATIENT)
Dept: CARDIOLOGY | Facility: CLINIC | Age: 80
End: 2020-01-30

## 2020-02-06 ENCOUNTER — RECORDS - HEALTHEAST (OUTPATIENT)
Dept: ADMINISTRATIVE | Facility: OTHER | Age: 80
End: 2020-02-06

## 2020-02-10 ENCOUNTER — OFFICE VISIT (OUTPATIENT)
Dept: OPHTHALMOLOGY | Facility: CLINIC | Age: 80
End: 2020-02-10
Payer: COMMERCIAL

## 2020-02-10 DIAGNOSIS — Q11.1 ANOPHTHALMOS: Primary | ICD-10-CM

## 2020-02-10 ASSESSMENT — VISUAL ACUITY
METHOD: SNELLEN - LINEAR
OS_CC: 20/40
OD_CC: PROS

## 2020-02-10 ASSESSMENT — CONF VISUAL FIELD
OD_SUPERIOR_TEMPORAL_RESTRICTION: 1
OS_NORMAL: 1
OD_INFERIOR_TEMPORAL_RESTRICTION: 1
OD_SUPERIOR_NASAL_RESTRICTION: 1
OD_INFERIOR_NASAL_RESTRICTION: 1

## 2020-02-10 ASSESSMENT — TONOMETRY
IOP_METHOD: ICARE
OD_IOP_MMHG: PROS
OS_IOP_MMHG: 7

## 2020-02-10 ASSESSMENT — SLIT LAMP EXAM - LIDS
COMMENTS: NORMAL
COMMENTS: NORMAL

## 2020-02-10 ASSESSMENT — EXTERNAL EXAM - RIGHT EYE: OD_EXAM: PROSTHESIS

## 2020-02-10 ASSESSMENT — EXTERNAL EXAM - LEFT EYE: OS_EXAM: NORMAL

## 2020-02-10 NOTE — PROGRESS NOTES
Chief Complaints and History of Present Illnesses   Patient presents with     Follow Up     Chief Complaint(s) and History of Present Illness(es)     Follow Up     In right eye.  Occurring constantly.  Since onset it is stable.    Associated symptoms include itching.  Treatments tried include no   treatments.  Pain was noted as 0/10.              Comments     s/p Evisceration of right  with placement of a 18  mm  silicone implant   on 10/30/2019.    Pt states got new prosthesis 2-3 weeks ago. Pt states on day 2 of the new   prosthesis, he woke up to terrible itching and swollen right upper lid. It   has since resolved. Pt happy with results.     Radha Kurtz, COT COT 10:17 AM February 10, 2020                      Assessment & Plan     Richie Gordillo is a 79 year old male with the following diagnoses:   ANOPHTHALMOS right eye      Prosthesis looks great  Socket in good shape    PLAN:  return to clinic 6 months                Attending Physician Attestation:  Complete documentation of historical and exam elements from today's encounter can be found in the full encounter summary report (not reduplicated in this progress note).  I personally obtained the chief complaint(s) and history of present illness.  I confirmed and edited as necessary the review of systems, past medical/surgical history, family history, social history, and examination findings as documented by others; and I examined the patient myself.  I personally reviewed the relevant tests, images, and reports as documented above.  I formulated and edited as necessary the assessment and plan and discussed the findings and management plan with the patient and family. I personally reviewed the ophthalmic test(s) associated with this encounter, agree with the interpretation(s) as documented by  and have edited the corresponding report(s) as necessary.   -Moe Mims MD  10:54 AM 2/10/2020

## 2020-02-10 NOTE — NURSING NOTE
Chief Complaints and History of Present Illnesses   Patient presents with     Follow Up     Chief Complaint(s) and History of Present Illness(es)     Follow Up     Laterality: right eye    Frequency: constantly    Course: stable    Associated symptoms: itching    Treatments tried: no treatments    Pain scale: 0/10              Comments     s/p Evisceration of right  with placement of a 18  mm  silicone implant on 10/30/2019.    Pt states got new prosthesis 2-3 weeks ago. Pt states on day 2 of the new prosthesis, he woke up to terrible itching and swollen right upper lid. It has since resolved. Pt happy with results.     Radha Kurtz, DMITRIY COT 10:17 AM February 10, 2020

## 2020-02-18 ENCOUNTER — OFFICE VISIT - HEALTHEAST (OUTPATIENT)
Dept: CARDIOLOGY | Facility: CLINIC | Age: 80
End: 2020-02-18

## 2020-02-18 ENCOUNTER — AMBULATORY - HEALTHEAST (OUTPATIENT)
Dept: CARDIOLOGY | Facility: CLINIC | Age: 80
End: 2020-02-18

## 2020-02-18 DIAGNOSIS — I50.32 CHRONIC HEART FAILURE WITH PRESERVED EJECTION FRACTION (H): ICD-10-CM

## 2020-02-18 DIAGNOSIS — I48.19 PERSISTENT ATRIAL FIBRILLATION (H): ICD-10-CM

## 2020-02-18 DIAGNOSIS — I10 ESSENTIAL HYPERTENSION: ICD-10-CM

## 2020-02-18 DIAGNOSIS — I48.91 A-FIB (H): ICD-10-CM

## 2020-02-18 DIAGNOSIS — N18.30 CHRONIC KIDNEY DISEASE, STAGE III (MODERATE) (H): ICD-10-CM

## 2020-02-18 ASSESSMENT — MIFFLIN-ST. JEOR: SCORE: 1343.14

## 2020-02-20 ENCOUNTER — COMMUNICATION - HEALTHEAST (OUTPATIENT)
Dept: INTERNAL MEDICINE | Facility: CLINIC | Age: 80
End: 2020-02-20

## 2020-02-20 ENCOUNTER — OFFICE VISIT - HEALTHEAST (OUTPATIENT)
Dept: INTERNAL MEDICINE | Facility: CLINIC | Age: 80
End: 2020-02-20

## 2020-02-20 DIAGNOSIS — I65.23 CAROTID ARTERY STENOSIS, ASYMPTOMATIC, BILATERAL: ICD-10-CM

## 2020-02-20 DIAGNOSIS — I10 ESSENTIAL HYPERTENSION: ICD-10-CM

## 2020-02-20 DIAGNOSIS — I48.92 ATRIAL FLUTTER, UNSPECIFIED TYPE (H): ICD-10-CM

## 2020-02-20 DIAGNOSIS — Z87.891 HISTORY OF TOBACCO USE: ICD-10-CM

## 2020-02-20 DIAGNOSIS — Z86.0100 HISTORY OF COLONIC POLYPS: ICD-10-CM

## 2020-02-20 DIAGNOSIS — I35.0 AORTIC STENOSIS, MODERATE: ICD-10-CM

## 2020-02-20 DIAGNOSIS — J43.9 PULMONARY EMPHYSEMA, UNSPECIFIED EMPHYSEMA TYPE (H): ICD-10-CM

## 2020-02-20 DIAGNOSIS — Z85.828 HISTORY OF SKIN CANCER: ICD-10-CM

## 2020-02-20 LAB
ATRIAL RATE - MUSE: 326 BPM
DIASTOLIC BLOOD PRESSURE - MUSE: NORMAL
INTERPRETATION ECG - MUSE: NORMAL
P AXIS - MUSE: NORMAL
PR INTERVAL - MUSE: NORMAL
QRS DURATION - MUSE: 80 MS
QT - MUSE: 350 MS
QTC - MUSE: 408 MS
R AXIS - MUSE: 53 DEGREES
SYSTOLIC BLOOD PRESSURE - MUSE: NORMAL
T AXIS - MUSE: 99 DEGREES
VENTRICULAR RATE- MUSE: 82 BPM

## 2020-02-26 ENCOUNTER — HOSPITAL ENCOUNTER (OUTPATIENT)
Dept: CARDIOLOGY | Facility: CLINIC | Age: 80
Discharge: HOME | End: 2020-02-26
Attending: NURSE PRACTITIONER | Admitting: NURSE PRACTITIONER

## 2020-02-26 ENCOUNTER — ANESTHESIA - HEALTHEAST (OUTPATIENT)
Dept: CARDIOLOGY | Facility: CLINIC | Age: 80
End: 2020-02-26

## 2020-02-26 DIAGNOSIS — I48.91 A-FIB (H): ICD-10-CM

## 2020-02-26 DIAGNOSIS — I10 ESSENTIAL HYPERTENSION: ICD-10-CM

## 2020-02-26 LAB
ATRIAL RATE - MUSE: 47 BPM
DIASTOLIC BLOOD PRESSURE - MUSE: NORMAL
INTERPRETATION ECG - MUSE: NORMAL
P AXIS - MUSE: 82 DEGREES
PR INTERVAL - MUSE: 220 MS
QRS DURATION - MUSE: 86 MS
QT - MUSE: 518 MS
QTC - MUSE: 458 MS
R AXIS - MUSE: 51 DEGREES
SYSTOLIC BLOOD PRESSURE - MUSE: NORMAL
T AXIS - MUSE: 76 DEGREES
VENTRICULAR RATE- MUSE: 47 BPM

## 2020-02-26 ASSESSMENT — MIFFLIN-ST. JEOR: SCORE: 1315.93

## 2020-02-28 ENCOUNTER — OFFICE VISIT - HEALTHEAST (OUTPATIENT)
Dept: INTERNAL MEDICINE | Facility: CLINIC | Age: 80
End: 2020-02-28

## 2020-02-28 DIAGNOSIS — N18.9 CHRONIC KIDNEY DISEASE, UNSPECIFIED CKD STAGE: ICD-10-CM

## 2020-02-28 DIAGNOSIS — Z86.0100 HISTORY OF COLONIC POLYPS: ICD-10-CM

## 2020-02-28 DIAGNOSIS — I65.23 CAROTID ARTERY STENOSIS, ASYMPTOMATIC, BILATERAL: ICD-10-CM

## 2020-02-28 DIAGNOSIS — J43.9 PULMONARY EMPHYSEMA, UNSPECIFIED EMPHYSEMA TYPE (H): ICD-10-CM

## 2020-02-28 DIAGNOSIS — I48.0 PAROXYSMAL ATRIAL FIBRILLATION (H): ICD-10-CM

## 2020-02-28 DIAGNOSIS — I10 ESSENTIAL HYPERTENSION: ICD-10-CM

## 2020-02-28 DIAGNOSIS — I35.0 AORTIC STENOSIS, MODERATE: ICD-10-CM

## 2020-03-09 ENCOUNTER — COMMUNICATION - HEALTHEAST (OUTPATIENT)
Dept: INTERNAL MEDICINE | Facility: CLINIC | Age: 80
End: 2020-03-09

## 2020-03-09 DIAGNOSIS — N40.0 BPH (BENIGN PROSTATIC HYPERPLASIA): ICD-10-CM

## 2020-03-10 ENCOUNTER — HOSPITAL ENCOUNTER (OUTPATIENT)
Dept: CT IMAGING | Facility: HOSPITAL | Age: 80
Discharge: HOME | End: 2020-03-10
Attending: INTERNAL MEDICINE

## 2020-03-10 ENCOUNTER — COMMUNICATION - HEALTHEAST (OUTPATIENT)
Dept: INTERNAL MEDICINE | Facility: CLINIC | Age: 80
End: 2020-03-10

## 2020-03-10 DIAGNOSIS — Z87.891 HISTORY OF TOBACCO USE: ICD-10-CM

## 2020-03-17 ENCOUNTER — AMBULATORY - HEALTHEAST (OUTPATIENT)
Dept: CARDIOLOGY | Facility: CLINIC | Age: 80
End: 2020-03-17

## 2020-03-17 DIAGNOSIS — I48.19 PERSISTENT ATRIAL FIBRILLATION (H): ICD-10-CM

## 2020-04-15 ENCOUNTER — COMMUNICATION - HEALTHEAST (OUTPATIENT)
Dept: INTERNAL MEDICINE | Facility: CLINIC | Age: 80
End: 2020-04-15

## 2020-04-15 DIAGNOSIS — I73.9 PERIPHERAL VASCULAR DISEASE (H): ICD-10-CM

## 2020-04-27 ENCOUNTER — OFFICE VISIT - HEALTHEAST (OUTPATIENT)
Dept: CARDIOLOGY | Facility: CLINIC | Age: 80
End: 2020-04-27

## 2020-04-27 DIAGNOSIS — I73.9 PERIPHERAL VASCULAR DISEASE (H): ICD-10-CM

## 2020-04-27 DIAGNOSIS — I65.23 CAROTID ARTERY STENOSIS, ASYMPTOMATIC, BILATERAL: ICD-10-CM

## 2020-04-27 DIAGNOSIS — R06.09 DOE (DYSPNEA ON EXERTION): ICD-10-CM

## 2020-04-27 DIAGNOSIS — I48.19 PERSISTENT ATRIAL FIBRILLATION (H): ICD-10-CM

## 2020-04-27 DIAGNOSIS — I35.0 AORTIC STENOSIS, MODERATE: ICD-10-CM

## 2020-04-27 ASSESSMENT — MIFFLIN-ST. JEOR: SCORE: 1334.07

## 2020-05-12 ENCOUNTER — OFFICE VISIT - HEALTHEAST (OUTPATIENT)
Dept: INTERNAL MEDICINE | Facility: CLINIC | Age: 80
End: 2020-05-12

## 2020-05-12 DIAGNOSIS — I65.23 CAROTID ARTERY STENOSIS, ASYMPTOMATIC, BILATERAL: ICD-10-CM

## 2020-05-12 DIAGNOSIS — Z87.891 HISTORY OF TOBACCO USE: ICD-10-CM

## 2020-05-12 DIAGNOSIS — I10 ESSENTIAL HYPERTENSION: ICD-10-CM

## 2020-05-12 DIAGNOSIS — N18.9 CHRONIC KIDNEY DISEASE, UNSPECIFIED CKD STAGE: ICD-10-CM

## 2020-05-12 DIAGNOSIS — Z86.0100 HISTORY OF COLONIC POLYPS: ICD-10-CM

## 2020-05-12 DIAGNOSIS — Z85.828 HISTORY OF SKIN CANCER: ICD-10-CM

## 2020-05-12 DIAGNOSIS — I35.0 AORTIC STENOSIS, MODERATE: ICD-10-CM

## 2020-05-12 DIAGNOSIS — I48.0 PAROXYSMAL ATRIAL FIBRILLATION (H): ICD-10-CM

## 2020-05-12 DIAGNOSIS — J43.9 PULMONARY EMPHYSEMA, UNSPECIFIED EMPHYSEMA TYPE (H): ICD-10-CM

## 2020-05-13 ENCOUNTER — OFFICE VISIT - HEALTHEAST (OUTPATIENT)
Dept: CARDIOLOGY | Facility: CLINIC | Age: 80
End: 2020-05-13

## 2020-05-13 DIAGNOSIS — I48.19 PERSISTENT ATRIAL FIBRILLATION (H): ICD-10-CM

## 2020-05-13 DIAGNOSIS — I35.0 AORTIC STENOSIS, MODERATE: ICD-10-CM

## 2020-05-13 DIAGNOSIS — I50.32 CHRONIC HEART FAILURE WITH PRESERVED EJECTION FRACTION (H): ICD-10-CM

## 2020-05-13 DIAGNOSIS — Z51.81 ENCOUNTER FOR MONITORING SOTALOL THERAPY: ICD-10-CM

## 2020-05-13 DIAGNOSIS — I65.23 BILATERAL CAROTID ARTERY STENOSIS: ICD-10-CM

## 2020-05-13 DIAGNOSIS — Z79.899 ENCOUNTER FOR MONITORING SOTALOL THERAPY: ICD-10-CM

## 2020-05-13 DIAGNOSIS — I10 ESSENTIAL HYPERTENSION: ICD-10-CM

## 2020-05-17 ENCOUNTER — COMMUNICATION - HEALTHEAST (OUTPATIENT)
Dept: INTERNAL MEDICINE | Facility: CLINIC | Age: 80
End: 2020-05-17

## 2020-05-17 DIAGNOSIS — F32.A DEPRESSION: ICD-10-CM

## 2020-05-26 ENCOUNTER — AMBULATORY - HEALTHEAST (OUTPATIENT)
Dept: CARDIOLOGY | Facility: CLINIC | Age: 80
End: 2020-05-26

## 2020-05-26 DIAGNOSIS — I48.91 A-FIB (H): ICD-10-CM

## 2020-05-27 ENCOUNTER — OFFICE VISIT - HEALTHEAST (OUTPATIENT)
Dept: INTERNAL MEDICINE | Facility: CLINIC | Age: 80
End: 2020-05-27

## 2020-05-27 DIAGNOSIS — I65.23 CAROTID ARTERY STENOSIS, ASYMPTOMATIC, BILATERAL: ICD-10-CM

## 2020-05-27 DIAGNOSIS — N18.9 CHRONIC KIDNEY DISEASE, UNSPECIFIED CKD STAGE: ICD-10-CM

## 2020-05-27 DIAGNOSIS — Z87.891 HISTORY OF TOBACCO USE: ICD-10-CM

## 2020-05-27 DIAGNOSIS — I10 ESSENTIAL HYPERTENSION: ICD-10-CM

## 2020-05-27 DIAGNOSIS — Z85.828 HISTORY OF SKIN CANCER: ICD-10-CM

## 2020-05-27 DIAGNOSIS — Z86.0100 HISTORY OF COLONIC POLYPS: ICD-10-CM

## 2020-05-27 DIAGNOSIS — I48.0 PAROXYSMAL ATRIAL FIBRILLATION (H): ICD-10-CM

## 2020-05-27 DIAGNOSIS — I35.0 AORTIC STENOSIS, MODERATE: ICD-10-CM

## 2020-05-29 ENCOUNTER — COMMUNICATION - HEALTHEAST (OUTPATIENT)
Dept: CARDIOLOGY | Facility: CLINIC | Age: 80
End: 2020-05-29

## 2020-05-29 DIAGNOSIS — I48.91 A-FIB (H): ICD-10-CM

## 2020-06-01 ENCOUNTER — COMMUNICATION - HEALTHEAST (OUTPATIENT)
Dept: CARDIOLOGY | Facility: CLINIC | Age: 80
End: 2020-06-01

## 2020-06-01 DIAGNOSIS — I48.91 A-FIB (H): ICD-10-CM

## 2020-06-03 ENCOUNTER — TELEPHONE (OUTPATIENT)
Dept: OPHTHALMOLOGY | Facility: CLINIC | Age: 80
End: 2020-06-03

## 2020-06-29 ENCOUNTER — OFFICE VISIT - HEALTHEAST (OUTPATIENT)
Dept: CARDIOLOGY | Facility: CLINIC | Age: 80
End: 2020-06-29

## 2020-06-29 DIAGNOSIS — I48.19 PERSISTENT ATRIAL FIBRILLATION (H): ICD-10-CM

## 2020-06-29 DIAGNOSIS — R23.3 EASY BRUISING: ICD-10-CM

## 2020-06-29 DIAGNOSIS — R60.0 BILATERAL LEG EDEMA: ICD-10-CM

## 2020-06-29 DIAGNOSIS — I35.0 AORTIC STENOSIS, MODERATE: ICD-10-CM

## 2020-06-29 DIAGNOSIS — I50.32 CHRONIC HEART FAILURE WITH PRESERVED EJECTION FRACTION (H): ICD-10-CM

## 2020-07-08 ENCOUNTER — COMMUNICATION - HEALTHEAST (OUTPATIENT)
Dept: CARDIOLOGY | Facility: CLINIC | Age: 80
End: 2020-07-08

## 2020-07-09 ENCOUNTER — AMBULATORY - HEALTHEAST (OUTPATIENT)
Dept: CARDIOLOGY | Facility: CLINIC | Age: 80
End: 2020-07-09

## 2020-07-09 DIAGNOSIS — R60.0 BILATERAL LEG EDEMA: ICD-10-CM

## 2020-07-09 DIAGNOSIS — I48.19 PERSISTENT ATRIAL FIBRILLATION (H): ICD-10-CM

## 2020-07-09 DIAGNOSIS — I50.32 CHRONIC HEART FAILURE WITH PRESERVED EJECTION FRACTION (H): ICD-10-CM

## 2020-07-09 LAB
ATRIAL RATE - MUSE: 59 BPM
BNP SERPL-MCNC: 270 PG/ML (ref 0–80)
DIASTOLIC BLOOD PRESSURE - MUSE: NORMAL
ERYTHROCYTE [DISTWIDTH] IN BLOOD BY AUTOMATED COUNT: 13.2 % (ref 11–14.5)
HCT VFR BLD AUTO: 40.2 % (ref 40–54)
HGB BLD-MCNC: 13.1 G/DL (ref 14–18)
INTERPRETATION ECG - MUSE: NORMAL
MCH RBC QN AUTO: 29 PG (ref 27–34)
MCHC RBC AUTO-ENTMCNC: 32.6 G/DL (ref 32–36)
MCV RBC AUTO: 89 FL (ref 80–100)
P AXIS - MUSE: 96 DEGREES
PLATELET # BLD AUTO: 237 THOU/UL (ref 140–440)
PMV BLD AUTO: 10.9 FL (ref 8.5–12.5)
PR INTERVAL - MUSE: 226 MS
QRS DURATION - MUSE: 90 MS
QT - MUSE: 456 MS
QTC - MUSE: 451 MS
R AXIS - MUSE: 47 DEGREES
RBC # BLD AUTO: 4.51 MILL/UL (ref 4.4–6.2)
SYSTOLIC BLOOD PRESSURE - MUSE: NORMAL
T AXIS - MUSE: 94 DEGREES
VENTRICULAR RATE- MUSE: 59 BPM
WBC: 7.5 THOU/UL (ref 4–11)

## 2020-07-14 ENCOUNTER — OFFICE VISIT - HEALTHEAST (OUTPATIENT)
Dept: CARDIOLOGY | Facility: CLINIC | Age: 80
End: 2020-07-14

## 2020-07-14 DIAGNOSIS — I50.32 CHRONIC HEART FAILURE WITH PRESERVED EJECTION FRACTION (H): ICD-10-CM

## 2020-07-14 DIAGNOSIS — I48.19 PERSISTENT ATRIAL FIBRILLATION (H): ICD-10-CM

## 2020-07-14 DIAGNOSIS — I10 ESSENTIAL HYPERTENSION: ICD-10-CM

## 2020-07-27 ENCOUNTER — OFFICE VISIT (OUTPATIENT)
Dept: OPHTHALMOLOGY | Facility: CLINIC | Age: 80
End: 2020-07-27
Payer: COMMERCIAL

## 2020-07-27 DIAGNOSIS — Q11.1 ANOPHTHALMOS: Primary | ICD-10-CM

## 2020-07-27 RX ORDER — SOTALOL HYDROCHLORIDE 80 MG/1
80 TABLET ORAL 2 TIMES DAILY
Status: ON HOLD | COMMUNITY
Start: 2020-07-01 | End: 2021-01-01

## 2020-07-27 ASSESSMENT — SLIT LAMP EXAM - LIDS
COMMENTS: NORMAL
COMMENTS: NORMAL

## 2020-07-27 ASSESSMENT — TONOMETRY
OS_IOP_MMHG: 11
IOP_METHOD: ICARE
OD_IOP_MMHG: PROS

## 2020-07-27 ASSESSMENT — VISUAL ACUITY
METHOD: SNELLEN - LINEAR
OS_CC: 20/30-2
OD_CC: PROSTHESIS
CORRECTION_TYPE: GLASSES

## 2020-07-27 ASSESSMENT — EXTERNAL EXAM - LEFT EYE: OS_EXAM: NORMAL

## 2020-07-27 NOTE — PROGRESS NOTES
Chief Complaints and History of Present Illnesses   Patient presents with     Evisceration of right eye follow up      Chief Complaint(s) and History of Present Illness(es)     Evisceration of right eye follow up                Comments     s/p Evisceration of right  with placement of a 18  mm  silicone implant   on 10/30/2019  He states that his prosthesis has moved on him twice, possibly from   rubbing it at night, but his wife was able to reposition it with the   plunger  He has been happy with it overall and no other complaints, feeling   comfortable now.    Ashley Fernandez, COT 10:53 AM 07/27/2020                     Assessment & Plan     Richie Gordillo is a 79 year old male with the following diagnoses:   1. Anophthalmos - Right Eye       S/p right eye evisceration 10/2019 doing well.  Here for follow-up, socket is healthy, however, there is one nylon suture tail centrally pushing up through the conjunctiva but without surrounding erosion or exposure.  Observe for now, RTC 1 year/ as needed           Arturo Ty MD, MPH  Oculoplastics Fellow    Attending Physician Attestation:  Complete documentation of historical and exam elements from today's encounter can be found in the full encounter summary report (not reduplicated in this progress note).  I personally obtained the chief complaint(s) and history of present illness.  I confirmed and edited as necessary the review of systems, past medical/surgical history, family history, social history, and examination findings as documented by others; and I examined the patient myself.  I personally reviewed the relevant tests, images, and reports as documented above.  I formulated and edited as necessary the assessment and plan and discussed the findings and management plan with the patient and family.   -Moe Mims MD

## 2020-08-07 ENCOUNTER — OFFICE VISIT - HEALTHEAST (OUTPATIENT)
Dept: INTERNAL MEDICINE | Facility: CLINIC | Age: 80
End: 2020-08-07

## 2020-08-07 DIAGNOSIS — I35.0 AORTIC STENOSIS, MODERATE: ICD-10-CM

## 2020-08-07 DIAGNOSIS — I48.0 PAROXYSMAL ATRIAL FIBRILLATION (H): ICD-10-CM

## 2020-08-07 DIAGNOSIS — Z51.81 ENCOUNTER FOR THERAPEUTIC DRUG MONITORING: ICD-10-CM

## 2020-08-07 DIAGNOSIS — Z86.0100 HISTORY OF COLONIC POLYPS: ICD-10-CM

## 2020-08-07 DIAGNOSIS — Z87.891 HISTORY OF TOBACCO USE: ICD-10-CM

## 2020-08-07 DIAGNOSIS — Z85.828 HISTORY OF SKIN CANCER: ICD-10-CM

## 2020-08-07 DIAGNOSIS — I10 ESSENTIAL HYPERTENSION: ICD-10-CM

## 2020-08-07 DIAGNOSIS — N18.9 CHRONIC KIDNEY DISEASE, UNSPECIFIED CKD STAGE: ICD-10-CM

## 2020-08-07 DIAGNOSIS — I65.23 CAROTID ARTERY STENOSIS, ASYMPTOMATIC, BILATERAL: ICD-10-CM

## 2020-08-07 LAB
ALBUMIN SERPL-MCNC: 3.6 G/DL (ref 3.5–5)
ALP SERPL-CCNC: 90 U/L (ref 45–120)
ALT SERPL W P-5'-P-CCNC: 10 U/L (ref 0–45)
ANION GAP SERPL CALCULATED.3IONS-SCNC: 10 MMOL/L (ref 5–18)
AST SERPL W P-5'-P-CCNC: 12 U/L (ref 0–40)
BILIRUB SERPL-MCNC: 0.4 MG/DL (ref 0–1)
BUN SERPL-MCNC: 23 MG/DL (ref 8–28)
CALCIUM SERPL-MCNC: 9.5 MG/DL (ref 8.5–10.5)
CHLORIDE BLD-SCNC: 101 MMOL/L (ref 98–107)
CO2 SERPL-SCNC: 28 MMOL/L (ref 22–31)
CREAT SERPL-MCNC: 1.77 MG/DL (ref 0.7–1.3)
GFR SERPL CREATININE-BSD FRML MDRD: 37 ML/MIN/1.73M2
GLUCOSE BLD-MCNC: 113 MG/DL (ref 70–125)
POTASSIUM BLD-SCNC: 4 MMOL/L (ref 3.5–5)
PROT SERPL-MCNC: 6.6 G/DL (ref 6–8)
SODIUM SERPL-SCNC: 139 MMOL/L (ref 136–145)

## 2020-09-30 ENCOUNTER — COMMUNICATION - HEALTHEAST (OUTPATIENT)
Dept: CARDIOLOGY | Facility: CLINIC | Age: 80
End: 2020-09-30

## 2020-12-03 ENCOUNTER — COMMUNICATION - HEALTHEAST (OUTPATIENT)
Dept: SCHEDULING | Facility: CLINIC | Age: 80
End: 2020-12-03

## 2020-12-03 ENCOUNTER — RECORDS - HEALTHEAST (OUTPATIENT)
Dept: GENERAL RADIOLOGY | Facility: CLINIC | Age: 80
End: 2020-12-03

## 2020-12-03 ENCOUNTER — OFFICE VISIT - HEALTHEAST (OUTPATIENT)
Dept: INTERNAL MEDICINE | Facility: CLINIC | Age: 80
End: 2020-12-03

## 2020-12-03 DIAGNOSIS — I10 ESSENTIAL HYPERTENSION: ICD-10-CM

## 2020-12-03 DIAGNOSIS — F17.200 TOBACCO DEPENDENCE SYNDROME: ICD-10-CM

## 2020-12-03 DIAGNOSIS — M25.572 PAIN IN JOINT, ANKLE AND FOOT, LEFT: ICD-10-CM

## 2020-12-03 DIAGNOSIS — M25.572 PAIN IN LEFT ANKLE AND JOINTS OF LEFT FOOT: ICD-10-CM

## 2020-12-03 DIAGNOSIS — M72.2 PLANTAR FASCIITIS: ICD-10-CM

## 2020-12-03 LAB
ANION GAP SERPL CALCULATED.3IONS-SCNC: 13 MMOL/L (ref 5–18)
BASOPHILS # BLD AUTO: 0 THOU/UL (ref 0–0.2)
BASOPHILS NFR BLD AUTO: 0 % (ref 0–2)
BUN SERPL-MCNC: 26 MG/DL (ref 8–28)
CALCIUM SERPL-MCNC: 8.9 MG/DL (ref 8.5–10.5)
CHLORIDE BLD-SCNC: 100 MMOL/L (ref 98–107)
CO2 SERPL-SCNC: 28 MMOL/L (ref 22–31)
CREAT SERPL-MCNC: 1.64 MG/DL (ref 0.7–1.3)
EOSINOPHIL # BLD AUTO: 0.4 THOU/UL (ref 0–0.4)
EOSINOPHIL NFR BLD AUTO: 4 % (ref 0–6)
ERYTHROCYTE [DISTWIDTH] IN BLOOD BY AUTOMATED COUNT: 12.1 % (ref 11–14.5)
GFR SERPL CREATININE-BSD FRML MDRD: 41 ML/MIN/1.73M2
GLUCOSE BLD-MCNC: 100 MG/DL (ref 70–125)
HCT VFR BLD AUTO: 41.3 % (ref 40–54)
HGB BLD-MCNC: 13.5 G/DL (ref 14–18)
LYMPHOCYTES # BLD AUTO: 1.8 THOU/UL (ref 0.8–4.4)
LYMPHOCYTES NFR BLD AUTO: 18 % (ref 20–40)
MCH RBC QN AUTO: 29.8 PG (ref 27–34)
MCHC RBC AUTO-ENTMCNC: 32.7 G/DL (ref 32–36)
MCV RBC AUTO: 91 FL (ref 80–100)
MONOCYTES # BLD AUTO: 0.6 THOU/UL (ref 0–0.9)
MONOCYTES NFR BLD AUTO: 6 % (ref 2–10)
NEUTROPHILS # BLD AUTO: 7.4 THOU/UL (ref 2–7.7)
NEUTROPHILS NFR BLD AUTO: 72 % (ref 50–70)
PLATELET # BLD AUTO: 258 THOU/UL (ref 140–440)
PMV BLD AUTO: 8.3 FL (ref 7–10)
POTASSIUM BLD-SCNC: 4.2 MMOL/L (ref 3.5–5)
RBC # BLD AUTO: 4.53 MILL/UL (ref 4.4–6.2)
SODIUM SERPL-SCNC: 141 MMOL/L (ref 136–145)
WBC: 10.3 THOU/UL (ref 4–11)

## 2020-12-04 ENCOUNTER — COMMUNICATION - HEALTHEAST (OUTPATIENT)
Dept: INTERNAL MEDICINE | Facility: CLINIC | Age: 80
End: 2020-12-04

## 2020-12-05 ENCOUNTER — COMMUNICATION - HEALTHEAST (OUTPATIENT)
Dept: CARDIOLOGY | Facility: CLINIC | Age: 80
End: 2020-12-05

## 2020-12-05 DIAGNOSIS — I48.91 ATRIAL FIBRILLATION WITH RAPID VENTRICULAR RESPONSE (H): ICD-10-CM

## 2021-01-01 ENCOUNTER — OFFICE VISIT (OUTPATIENT)
Dept: PULMONOLOGY | Facility: OTHER | Age: 81
End: 2021-01-01
Attending: FAMILY MEDICINE
Payer: COMMERCIAL

## 2021-01-01 ENCOUNTER — RECORDS - HEALTHEAST (OUTPATIENT)
Dept: ADMINISTRATIVE | Facility: CLINIC | Age: 81
End: 2021-01-01

## 2021-01-01 ENCOUNTER — COMMUNICATION - HEALTHEAST (OUTPATIENT)
Dept: INTERNAL MEDICINE | Facility: CLINIC | Age: 81
End: 2021-01-01

## 2021-01-01 ENCOUNTER — HOSPITAL ENCOUNTER (OUTPATIENT)
Dept: RESPIRATORY THERAPY | Facility: CLINIC | Age: 81
Discharge: HOME | End: 2021-08-10
Admitting: INTERNAL MEDICINE
Payer: COMMERCIAL

## 2021-01-01 ENCOUNTER — AMBULATORY - HEALTHEAST (OUTPATIENT)
Dept: NURSING | Facility: CLINIC | Age: 81
End: 2021-01-01

## 2021-01-01 ENCOUNTER — COMMUNICATION - HEALTHEAST (OUTPATIENT)
Dept: SCHEDULING | Facility: CLINIC | Age: 81
End: 2021-01-01

## 2021-01-01 ENCOUNTER — OFFICE VISIT - HEALTHEAST (OUTPATIENT)
Dept: INTERNAL MEDICINE | Facility: CLINIC | Age: 81
End: 2021-01-01

## 2021-01-01 ENCOUNTER — COMMUNICATION - HEALTHEAST (OUTPATIENT)
Dept: ADMINISTRATIVE | Facility: CLINIC | Age: 81
End: 2021-01-01

## 2021-01-01 ENCOUNTER — RECORDS - HEALTHEAST (OUTPATIENT)
Dept: ADMINISTRATIVE | Facility: OTHER | Age: 81
End: 2021-01-01

## 2021-01-01 ENCOUNTER — OFFICE VISIT (OUTPATIENT)
Dept: INTERNAL MEDICINE | Facility: CLINIC | Age: 81
End: 2021-01-01
Payer: COMMERCIAL

## 2021-01-01 ENCOUNTER — OFFICE VISIT - HEALTHEAST (OUTPATIENT)
Dept: FAMILY MEDICINE | Facility: CLINIC | Age: 81
End: 2021-01-01

## 2021-01-01 ENCOUNTER — HOSPITAL ENCOUNTER (OUTPATIENT)
Dept: NUCLEAR MEDICINE | Facility: CLINIC | Age: 81
Discharge: HOME | End: 2021-01-14
Attending: INTERNAL MEDICINE

## 2021-01-01 ENCOUNTER — HEALTH MAINTENANCE LETTER (OUTPATIENT)
Age: 81
End: 2021-01-01

## 2021-01-01 ENCOUNTER — APPOINTMENT (OUTPATIENT)
Dept: PHYSICAL THERAPY | Facility: CLINIC | Age: 81
DRG: 291 | End: 2021-01-01
Attending: FAMILY MEDICINE
Payer: COMMERCIAL

## 2021-01-01 ENCOUNTER — HOSPITAL ENCOUNTER (INPATIENT)
Dept: CT IMAGING | Facility: CLINIC | Age: 81
DRG: 291 | End: 2021-07-15
Attending: EMERGENCY MEDICINE
Payer: COMMERCIAL

## 2021-01-01 ENCOUNTER — HOSPITAL ENCOUNTER (EMERGENCY)
Dept: RADIOLOGY | Facility: CLINIC | Age: 81
DRG: 291 | End: 2021-07-15
Attending: EMERGENCY MEDICINE
Payer: COMMERCIAL

## 2021-01-01 ENCOUNTER — COMMUNICATION - HEALTHEAST (OUTPATIENT)
Dept: CARDIOLOGY | Facility: CLINIC | Age: 81
End: 2021-01-01

## 2021-01-01 ENCOUNTER — VIRTUAL VISIT (OUTPATIENT)
Dept: INTERNAL MEDICINE | Facility: CLINIC | Age: 81
End: 2021-01-01
Payer: COMMERCIAL

## 2021-01-01 ENCOUNTER — OFFICE VISIT (OUTPATIENT)
Dept: OPHTHALMOLOGY | Facility: CLINIC | Age: 81
End: 2021-01-01
Payer: COMMERCIAL

## 2021-01-01 ENCOUNTER — PATIENT OUTREACH (OUTPATIENT)
Dept: CARE COORDINATION | Facility: CLINIC | Age: 81
End: 2021-01-01

## 2021-01-01 ENCOUNTER — HOSPITAL ENCOUNTER (INPATIENT)
Facility: CLINIC | Age: 81
LOS: 3 days | Discharge: HOME | DRG: 291 | End: 2021-07-18
Attending: EMERGENCY MEDICINE | Admitting: FAMILY MEDICINE
Payer: COMMERCIAL

## 2021-01-01 ENCOUNTER — HOSPITAL ENCOUNTER (OUTPATIENT)
Dept: CARDIOLOGY | Facility: CLINIC | Age: 81
Discharge: HOME | End: 2021-01-14
Attending: INTERNAL MEDICINE

## 2021-01-01 ENCOUNTER — TELEPHONE (OUTPATIENT)
Dept: INTERNAL MEDICINE | Facility: CLINIC | Age: 81
End: 2021-01-01
Payer: COMMERCIAL

## 2021-01-01 ENCOUNTER — OFFICE VISIT - HEALTHEAST (OUTPATIENT)
Dept: CARDIOLOGY | Facility: CLINIC | Age: 81
End: 2021-01-01

## 2021-01-01 ENCOUNTER — OFFICE VISIT (OUTPATIENT)
Dept: CARDIOLOGY | Facility: CLINIC | Age: 81
End: 2021-01-01
Payer: COMMERCIAL

## 2021-01-01 ENCOUNTER — RECORDS - HEALTHEAST (OUTPATIENT)
Dept: INTERVENTIONAL RADIOLOGY/VASCULAR | Facility: CLINIC | Age: 81
End: 2021-01-01

## 2021-01-01 ENCOUNTER — AMBULATORY - HEALTHEAST (OUTPATIENT)
Dept: CARDIOLOGY | Facility: CLINIC | Age: 81
End: 2021-01-01

## 2021-01-01 ENCOUNTER — TELEPHONE (OUTPATIENT)
Dept: INTERNAL MEDICINE | Facility: CLINIC | Age: 81
End: 2021-01-01

## 2021-01-01 ENCOUNTER — NURSE TRIAGE (OUTPATIENT)
Dept: NURSING | Facility: CLINIC | Age: 81
End: 2021-01-01

## 2021-01-01 VITALS
DIASTOLIC BLOOD PRESSURE: 60 MMHG | BODY MASS INDEX: 21.13 KG/M2 | WEIGHT: 139 LBS | SYSTOLIC BLOOD PRESSURE: 130 MMHG | HEART RATE: 72 BPM

## 2021-01-01 VITALS
WEIGHT: 141 LBS | HEART RATE: 59 BPM | DIASTOLIC BLOOD PRESSURE: 65 MMHG | BODY MASS INDEX: 21.37 KG/M2 | SYSTOLIC BLOOD PRESSURE: 162 MMHG | RESPIRATION RATE: 16 BRPM | HEIGHT: 68 IN

## 2021-01-01 VITALS
HEIGHT: 68 IN | TEMPERATURE: 98.2 F | DIASTOLIC BLOOD PRESSURE: 58 MMHG | HEART RATE: 64 BPM | SYSTOLIC BLOOD PRESSURE: 130 MMHG | OXYGEN SATURATION: 94 % | DIASTOLIC BLOOD PRESSURE: 62 MMHG | HEART RATE: 57 BPM | HEIGHT: 68 IN | WEIGHT: 139 LBS | BODY MASS INDEX: 21.07 KG/M2 | SYSTOLIC BLOOD PRESSURE: 110 MMHG | WEIGHT: 145.8 LBS | BODY MASS INDEX: 22.1 KG/M2

## 2021-01-01 VITALS
RESPIRATION RATE: 16 BRPM | DIASTOLIC BLOOD PRESSURE: 60 MMHG | WEIGHT: 134.31 LBS | SYSTOLIC BLOOD PRESSURE: 148 MMHG | HEART RATE: 60 BPM | BODY MASS INDEX: 20.42 KG/M2

## 2021-01-01 VITALS — WEIGHT: 148.9 LBS | BODY MASS INDEX: 21.99 KG/M2

## 2021-01-01 VITALS — BODY MASS INDEX: 22.68 KG/M2 | WEIGHT: 153.56 LBS

## 2021-01-01 VITALS
BODY MASS INDEX: 21.83 KG/M2 | SYSTOLIC BLOOD PRESSURE: 138 MMHG | HEART RATE: 60 BPM | WEIGHT: 143.6 LBS | DIASTOLIC BLOOD PRESSURE: 68 MMHG

## 2021-01-01 VITALS
DIASTOLIC BLOOD PRESSURE: 54 MMHG | HEART RATE: 76 BPM | BODY MASS INDEX: 21.67 KG/M2 | WEIGHT: 143 LBS | RESPIRATION RATE: 16 BRPM | SYSTOLIC BLOOD PRESSURE: 110 MMHG | HEIGHT: 68 IN

## 2021-01-01 VITALS
SYSTOLIC BLOOD PRESSURE: 150 MMHG | BODY MASS INDEX: 20.61 KG/M2 | HEART RATE: 56 BPM | DIASTOLIC BLOOD PRESSURE: 66 MMHG | RESPIRATION RATE: 16 BRPM | HEIGHT: 68 IN | WEIGHT: 136 LBS

## 2021-01-01 VITALS
OXYGEN SATURATION: 98 % | DIASTOLIC BLOOD PRESSURE: 60 MMHG | BODY MASS INDEX: 20.83 KG/M2 | SYSTOLIC BLOOD PRESSURE: 114 MMHG | WEIGHT: 137 LBS | HEART RATE: 58 BPM

## 2021-01-01 VITALS
OXYGEN SATURATION: 90 % | TEMPERATURE: 97.5 F | BODY MASS INDEX: 19.58 KG/M2 | HEIGHT: 68 IN | WEIGHT: 129.2 LBS | SYSTOLIC BLOOD PRESSURE: 159 MMHG | RESPIRATION RATE: 18 BRPM | HEART RATE: 70 BPM | DIASTOLIC BLOOD PRESSURE: 70 MMHG

## 2021-01-01 VITALS
DIASTOLIC BLOOD PRESSURE: 65 MMHG | OXYGEN SATURATION: 97 % | WEIGHT: 152.13 LBS | HEART RATE: 80 BPM | WEIGHT: 141.3 LBS | BODY MASS INDEX: 22.53 KG/M2 | BODY MASS INDEX: 21.64 KG/M2 | SYSTOLIC BLOOD PRESSURE: 120 MMHG | HEIGHT: 69 IN

## 2021-01-01 VITALS — HEIGHT: 69 IN | BODY MASS INDEX: 22.81 KG/M2 | WEIGHT: 154 LBS

## 2021-01-01 VITALS — WEIGHT: 148 LBS | BODY MASS INDEX: 21.92 KG/M2 | HEIGHT: 69 IN

## 2021-01-01 VITALS
OXYGEN SATURATION: 90 % | HEART RATE: 58 BPM | WEIGHT: 146.4 LBS | BODY MASS INDEX: 22.26 KG/M2 | RESPIRATION RATE: 14 BRPM | SYSTOLIC BLOOD PRESSURE: 96 MMHG | DIASTOLIC BLOOD PRESSURE: 60 MMHG

## 2021-01-01 VITALS — BODY MASS INDEX: 23.42 KG/M2 | WEIGHT: 158.56 LBS

## 2021-01-01 VITALS
TEMPERATURE: 97.6 F | DIASTOLIC BLOOD PRESSURE: 58 MMHG | BODY MASS INDEX: 21.13 KG/M2 | SYSTOLIC BLOOD PRESSURE: 154 MMHG | HEART RATE: 60 BPM | WEIGHT: 139 LBS

## 2021-01-01 VITALS
OXYGEN SATURATION: 97 % | DIASTOLIC BLOOD PRESSURE: 58 MMHG | BODY MASS INDEX: 22.05 KG/M2 | WEIGHT: 149.3 LBS | HEART RATE: 64 BPM | SYSTOLIC BLOOD PRESSURE: 145 MMHG

## 2021-01-01 VITALS
WEIGHT: 143 LBS | DIASTOLIC BLOOD PRESSURE: 66 MMHG | SYSTOLIC BLOOD PRESSURE: 124 MMHG | BODY MASS INDEX: 21.74 KG/M2 | HEART RATE: 76 BPM

## 2021-01-01 VITALS
WEIGHT: 152 LBS | HEART RATE: 68 BPM | BODY MASS INDEX: 23.04 KG/M2 | HEIGHT: 68 IN | RESPIRATION RATE: 16 BRPM | SYSTOLIC BLOOD PRESSURE: 110 MMHG | DIASTOLIC BLOOD PRESSURE: 70 MMHG

## 2021-01-01 VITALS
DIASTOLIC BLOOD PRESSURE: 54 MMHG | SYSTOLIC BLOOD PRESSURE: 80 MMHG | RESPIRATION RATE: 16 BRPM | HEART RATE: 76 BPM | BODY MASS INDEX: 21.67 KG/M2 | WEIGHT: 143 LBS | HEIGHT: 68 IN

## 2021-01-01 VITALS — WEIGHT: 154 LBS | BODY MASS INDEX: 22.74 KG/M2

## 2021-01-01 VITALS
OXYGEN SATURATION: 95 % | DIASTOLIC BLOOD PRESSURE: 60 MMHG | HEIGHT: 68 IN | SYSTOLIC BLOOD PRESSURE: 144 MMHG | WEIGHT: 136 LBS | HEART RATE: 61 BPM | BODY MASS INDEX: 20.61 KG/M2

## 2021-01-01 VITALS
BODY MASS INDEX: 20.46 KG/M2 | HEART RATE: 68 BPM | WEIGHT: 135 LBS | DIASTOLIC BLOOD PRESSURE: 62 MMHG | RESPIRATION RATE: 16 BRPM | HEIGHT: 68 IN | SYSTOLIC BLOOD PRESSURE: 140 MMHG

## 2021-01-01 VITALS
BODY MASS INDEX: 20.46 KG/M2 | WEIGHT: 135 LBS | SYSTOLIC BLOOD PRESSURE: 160 MMHG | DIASTOLIC BLOOD PRESSURE: 60 MMHG | HEIGHT: 68 IN | HEART RATE: 64 BPM | RESPIRATION RATE: 14 BRPM

## 2021-01-01 VITALS
OXYGEN SATURATION: 94 % | DIASTOLIC BLOOD PRESSURE: 80 MMHG | SYSTOLIC BLOOD PRESSURE: 140 MMHG | HEART RATE: 134 BPM | BODY MASS INDEX: 22.98 KG/M2 | WEIGHT: 150 LBS

## 2021-01-01 VITALS — HEIGHT: 69 IN | WEIGHT: 150 LBS | BODY MASS INDEX: 22.22 KG/M2

## 2021-01-01 VITALS — BODY MASS INDEX: 23.15 KG/M2 | HEIGHT: 69 IN | WEIGHT: 156.3 LBS

## 2021-01-01 VITALS
HEART RATE: 81 BPM | BODY MASS INDEX: 22.36 KG/M2 | TEMPERATURE: 98.9 F | WEIGHT: 146 LBS | OXYGEN SATURATION: 94 % | DIASTOLIC BLOOD PRESSURE: 74 MMHG | RESPIRATION RATE: 16 BRPM | SYSTOLIC BLOOD PRESSURE: 176 MMHG

## 2021-01-01 VITALS — WEIGHT: 150 LBS | BODY MASS INDEX: 22.15 KG/M2

## 2021-01-01 VITALS
DIASTOLIC BLOOD PRESSURE: 56 MMHG | WEIGHT: 143 LBS | SYSTOLIC BLOOD PRESSURE: 130 MMHG | BODY MASS INDEX: 21.74 KG/M2 | HEART RATE: 56 BPM

## 2021-01-01 VITALS — BODY MASS INDEX: 21.92 KG/M2 | WEIGHT: 148 LBS | HEIGHT: 69 IN

## 2021-01-01 VITALS
SYSTOLIC BLOOD PRESSURE: 126 MMHG | WEIGHT: 137 LBS | BODY MASS INDEX: 20.83 KG/M2 | DIASTOLIC BLOOD PRESSURE: 54 MMHG | HEART RATE: 60 BPM | TEMPERATURE: 96.4 F

## 2021-01-01 VITALS
WEIGHT: 142 LBS | HEART RATE: 59 BPM | DIASTOLIC BLOOD PRESSURE: 71 MMHG | BODY MASS INDEX: 21.59 KG/M2 | SYSTOLIC BLOOD PRESSURE: 145 MMHG

## 2021-01-01 VITALS
OXYGEN SATURATION: 94 % | HEART RATE: 64 BPM | WEIGHT: 136 LBS | DIASTOLIC BLOOD PRESSURE: 52 MMHG | BODY MASS INDEX: 20.61 KG/M2 | HEIGHT: 68 IN | SYSTOLIC BLOOD PRESSURE: 122 MMHG

## 2021-01-01 VITALS
BODY MASS INDEX: 21 KG/M2 | DIASTOLIC BLOOD PRESSURE: 76 MMHG | SYSTOLIC BLOOD PRESSURE: 188 MMHG | HEART RATE: 58 BPM | WEIGHT: 138.1 LBS

## 2021-01-01 VITALS
WEIGHT: 140 LBS | DIASTOLIC BLOOD PRESSURE: 70 MMHG | SYSTOLIC BLOOD PRESSURE: 118 MMHG | BODY MASS INDEX: 21.44 KG/M2 | HEART RATE: 66 BPM

## 2021-01-01 VITALS
DIASTOLIC BLOOD PRESSURE: 70 MMHG | HEIGHT: 68 IN | WEIGHT: 131.9 LBS | OXYGEN SATURATION: 92 % | HEART RATE: 80 BPM | BODY MASS INDEX: 19.99 KG/M2 | SYSTOLIC BLOOD PRESSURE: 140 MMHG

## 2021-01-01 VITALS — HEIGHT: 68 IN | BODY MASS INDEX: 20.76 KG/M2 | WEIGHT: 137 LBS

## 2021-01-01 VITALS — BODY MASS INDEX: 21.86 KG/M2 | WEIGHT: 148 LBS

## 2021-01-01 VITALS — HEIGHT: 69 IN | BODY MASS INDEX: 21.62 KG/M2 | WEIGHT: 146 LBS

## 2021-01-01 VITALS — WEIGHT: 148 LBS | BODY MASS INDEX: 21.86 KG/M2

## 2021-01-01 DIAGNOSIS — I35.0 AORTIC VALVE STENOSIS, ETIOLOGY OF CARDIAC VALVE DISEASE UNSPECIFIED: ICD-10-CM

## 2021-01-01 DIAGNOSIS — G47.33 OBSTRUCTIVE SLEEP APNEA (ADULT) (PEDIATRIC): ICD-10-CM

## 2021-01-01 DIAGNOSIS — I50.33 ACUTE ON CHRONIC DIASTOLIC CONGESTIVE HEART FAILURE (H): ICD-10-CM

## 2021-01-01 DIAGNOSIS — I50.32 CHRONIC HEART FAILURE WITH PRESERVED EJECTION FRACTION (H): ICD-10-CM

## 2021-01-01 DIAGNOSIS — N18.32 STAGE 3B CHRONIC KIDNEY DISEASE (H): ICD-10-CM

## 2021-01-01 DIAGNOSIS — R06.09 EXERTIONAL DYSPNEA: ICD-10-CM

## 2021-01-01 DIAGNOSIS — I35.0 AORTIC STENOSIS, MODERATE: Primary | ICD-10-CM

## 2021-01-01 DIAGNOSIS — I10 ESSENTIAL HYPERTENSION: Chronic | ICD-10-CM

## 2021-01-01 DIAGNOSIS — I10 ESSENTIAL HYPERTENSION: ICD-10-CM

## 2021-01-01 DIAGNOSIS — J44.1 COPD WITH ACUTE EXACERBATION (H): ICD-10-CM

## 2021-01-01 DIAGNOSIS — I48.91 A-FIB (H): ICD-10-CM

## 2021-01-01 DIAGNOSIS — N18.30 CHRONIC RENAL INSUFFICIENCY, STAGE 3 (MODERATE) (H): ICD-10-CM

## 2021-01-01 DIAGNOSIS — Z87.891 HISTORY OF TOBACCO USE: ICD-10-CM

## 2021-01-01 DIAGNOSIS — R06.00 ACUTE DYSPNEA: Primary | ICD-10-CM

## 2021-01-01 DIAGNOSIS — I73.9 PERIPHERAL VASCULAR DISEASE (H): ICD-10-CM

## 2021-01-01 DIAGNOSIS — N40.0 BPH (BENIGN PROSTATIC HYPERPLASIA): ICD-10-CM

## 2021-01-01 DIAGNOSIS — E78.00 HYPERCHOLESTEROLEMIA: ICD-10-CM

## 2021-01-01 DIAGNOSIS — I48.0 PAROXYSMAL ATRIAL FIBRILLATION (H): ICD-10-CM

## 2021-01-01 DIAGNOSIS — N18.32 CHRONIC RENAL IMPAIRMENT, STAGE 3B (H): ICD-10-CM

## 2021-01-01 DIAGNOSIS — Q11.1 ANOPHTHALMOS: Primary | ICD-10-CM

## 2021-01-01 DIAGNOSIS — Z79.899 ENCOUNTER FOR MONITORING SOTALOL THERAPY: ICD-10-CM

## 2021-01-01 DIAGNOSIS — Z85.828 HISTORY OF SQUAMOUS CELL CARCINOMA OF SKIN: ICD-10-CM

## 2021-01-01 DIAGNOSIS — G47.00 INSOMNIA, UNSPECIFIED TYPE: ICD-10-CM

## 2021-01-01 DIAGNOSIS — I48.11 LONGSTANDING PERSISTENT ATRIAL FIBRILLATION (H): ICD-10-CM

## 2021-01-01 DIAGNOSIS — J44.9 COPD (CHRONIC OBSTRUCTIVE PULMONARY DISEASE) (H): ICD-10-CM

## 2021-01-01 DIAGNOSIS — J96.21 ACUTE AND CHRONIC RESPIRATORY FAILURE WITH HYPOXIA (H): ICD-10-CM

## 2021-01-01 DIAGNOSIS — I10 ESSENTIAL HYPERTENSION, BENIGN: ICD-10-CM

## 2021-01-01 DIAGNOSIS — N18.30 STAGE 3 CHRONIC KIDNEY DISEASE, UNSPECIFIED WHETHER STAGE 3A OR 3B CKD (H): ICD-10-CM

## 2021-01-01 DIAGNOSIS — J44.9 CHRONIC OBSTRUCTIVE PULMONARY DISEASE, UNSPECIFIED COPD TYPE (H): ICD-10-CM

## 2021-01-01 DIAGNOSIS — N18.9 ACUTE KIDNEY INJURY SUPERIMPOSED ON CKD (H): ICD-10-CM

## 2021-01-01 DIAGNOSIS — J43.9 PULMONARY EMPHYSEMA, UNSPECIFIED EMPHYSEMA TYPE (H): ICD-10-CM

## 2021-01-01 DIAGNOSIS — J44.9 CHRONIC OBSTRUCTIVE PULMONARY DISEASE, UNSPECIFIED COPD TYPE (H): Primary | ICD-10-CM

## 2021-01-01 DIAGNOSIS — R06.09 DOE (DYSPNEA ON EXERTION): Primary | ICD-10-CM

## 2021-01-01 DIAGNOSIS — I50.31 ACUTE DIASTOLIC CONGESTIVE HEART FAILURE (H): ICD-10-CM

## 2021-01-01 DIAGNOSIS — J44.9 COPD (CHRONIC OBSTRUCTIVE PULMONARY DISEASE) (H): Primary | ICD-10-CM

## 2021-01-01 DIAGNOSIS — F17.201 TOBACCO USE DISORDER, MODERATE, IN EARLY REMISSION: ICD-10-CM

## 2021-01-01 DIAGNOSIS — Z86.0100 HISTORY OF COLONIC POLYPS: ICD-10-CM

## 2021-01-01 DIAGNOSIS — I65.23 CAROTID ARTERY STENOSIS, ASYMPTOMATIC, BILATERAL: ICD-10-CM

## 2021-01-01 DIAGNOSIS — I48.19 PERSISTENT ATRIAL FIBRILLATION (H): ICD-10-CM

## 2021-01-01 DIAGNOSIS — Z51.81 ENCOUNTER FOR THERAPEUTIC DRUG MONITORING: ICD-10-CM

## 2021-01-01 DIAGNOSIS — I35.0 AORTIC STENOSIS, MODERATE: ICD-10-CM

## 2021-01-01 DIAGNOSIS — I25.10 CORONARY ARTERY DISEASE DUE TO CALCIFIED CORONARY LESION: ICD-10-CM

## 2021-01-01 DIAGNOSIS — R06.02 SOB (SHORTNESS OF BREATH): ICD-10-CM

## 2021-01-01 DIAGNOSIS — J41.0 SIMPLE CHRONIC BRONCHITIS (H): ICD-10-CM

## 2021-01-01 DIAGNOSIS — I35.0 MODERATE AORTIC VALVE STENOSIS: ICD-10-CM

## 2021-01-01 DIAGNOSIS — N40.0 BENIGN PROSTATIC HYPERPLASIA, UNSPECIFIED WHETHER LOWER URINARY TRACT SYMPTOMS PRESENT: ICD-10-CM

## 2021-01-01 DIAGNOSIS — E87.1 HYPONATREMIA: ICD-10-CM

## 2021-01-01 DIAGNOSIS — I50.23 ACUTE ON CHRONIC SYSTOLIC CONGESTIVE HEART FAILURE (H): ICD-10-CM

## 2021-01-01 DIAGNOSIS — Z23 HIGH PRIORITY FOR 2019-NCOV VACCINE: ICD-10-CM

## 2021-01-01 DIAGNOSIS — Z51.81 ENCOUNTER FOR MONITORING SOTALOL THERAPY: ICD-10-CM

## 2021-01-01 DIAGNOSIS — I11.0 LVH (LEFT VENTRICULAR HYPERTROPHY) DUE TO HYPERTENSIVE DISEASE, WITH HEART FAILURE (H): ICD-10-CM

## 2021-01-01 DIAGNOSIS — N17.9 ACUTE KIDNEY INJURY SUPERIMPOSED ON CKD (H): ICD-10-CM

## 2021-01-01 DIAGNOSIS — J44.1 COPD EXACERBATION (H): ICD-10-CM

## 2021-01-01 DIAGNOSIS — Z71.89 OTHER SPECIFIED COUNSELING: ICD-10-CM

## 2021-01-01 DIAGNOSIS — I48.91 ATRIAL FIBRILLATION WITH RAPID VENTRICULAR RESPONSE (H): ICD-10-CM

## 2021-01-01 DIAGNOSIS — I50.32 CHRONIC DIASTOLIC CONGESTIVE HEART FAILURE (H): Primary | ICD-10-CM

## 2021-01-01 DIAGNOSIS — G47.33 OBSTRUCTIVE SLEEP APNEA SYNDROME: ICD-10-CM

## 2021-01-01 DIAGNOSIS — N18.31 STAGE 3A CHRONIC KIDNEY DISEASE (H): ICD-10-CM

## 2021-01-01 DIAGNOSIS — Z00.00 ENCOUNTER FOR WELLNESS EXAMINATION IN ADULT: Primary | ICD-10-CM

## 2021-01-01 DIAGNOSIS — I25.84 CORONARY ARTERY DISEASE DUE TO CALCIFIED CORONARY LESION: ICD-10-CM

## 2021-01-01 LAB
ALBUMIN SERPL-MCNC: 3 G/DL (ref 3.5–5)
ALBUMIN SERPL-MCNC: 3.1 G/DL (ref 3.5–5)
ALBUMIN SERPL-MCNC: 3.3 G/DL (ref 3.5–5)
ALBUMIN SERPL-MCNC: 3.7 G/DL (ref 3.5–5)
ALP SERPL-CCNC: 75 U/L (ref 45–120)
ALP SERPL-CCNC: 83 U/L (ref 45–120)
ALP SERPL-CCNC: 88 U/L (ref 45–120)
ALP SERPL-CCNC: 89 U/L (ref 45–120)
ALT SERPL W P-5'-P-CCNC: 14 U/L (ref 0–45)
ALT SERPL W P-5'-P-CCNC: 9 U/L (ref 0–45)
ALT SERPL W P-5'-P-CCNC: <9 U/L (ref 0–45)
ALT SERPL W P-5'-P-CCNC: <9 U/L (ref 0–45)
ANION GAP SERPL CALCULATED.3IONS-SCNC: 10 MMOL/L (ref 5–18)
ANION GAP SERPL CALCULATED.3IONS-SCNC: 11 MMOL/L (ref 5–18)
ANION GAP SERPL CALCULATED.3IONS-SCNC: 11 MMOL/L (ref 5–18)
ANION GAP SERPL CALCULATED.3IONS-SCNC: 12 MMOL/L (ref 5–18)
ANION GAP SERPL CALCULATED.3IONS-SCNC: 12 MMOL/L (ref 5–18)
ANION GAP SERPL CALCULATED.3IONS-SCNC: 14 MMOL/L (ref 5–18)
ANION GAP SERPL CALCULATED.3IONS-SCNC: 7 MMOL/L (ref 5–18)
ANION GAP SERPL CALCULATED.3IONS-SCNC: 8 MMOL/L (ref 5–18)
ANION GAP SERPL CALCULATED.3IONS-SCNC: 9 MMOL/L (ref 5–18)
AST SERPL W P-5'-P-CCNC: 12 U/L (ref 0–40)
AST SERPL W P-5'-P-CCNC: 12 U/L (ref 0–40)
AST SERPL W P-5'-P-CCNC: 13 U/L (ref 0–40)
AST SERPL W P-5'-P-CCNC: 18 U/L (ref 0–40)
ATRIAL RATE - MUSE: 66 BPM
ATRIAL RATE - MUSE: 69 BPM
ATRIAL RATE - MUSE: 84 BPM
BASE EXCESS BLDV CALC-SCNC: 5.3 MMOL/L
BASOPHILS # BLD AUTO: 0 10E3/UL (ref 0–0.2)
BASOPHILS # BLD AUTO: 0 10E3/UL (ref 0–0.2)
BASOPHILS NFR BLD AUTO: 0 %
BASOPHILS NFR BLD AUTO: 1 %
BILIRUB SERPL-MCNC: 0.2 MG/DL (ref 0–1)
BILIRUB SERPL-MCNC: 0.3 MG/DL (ref 0–1)
BILIRUB SERPL-MCNC: 0.3 MG/DL (ref 0–1)
BILIRUB SERPL-MCNC: 0.4 MG/DL (ref 0–1)
BNP SERPL-MCNC: 1268 PG/ML (ref 0–82)
BNP SERPL-MCNC: 867 PG/ML (ref 0–82)
BUN SERPL-MCNC: 15 MG/DL (ref 8–28)
BUN SERPL-MCNC: 23 MG/DL (ref 8–28)
BUN SERPL-MCNC: 24 MG/DL (ref 8–28)
BUN SERPL-MCNC: 27 MG/DL (ref 8–28)
BUN SERPL-MCNC: 35 MG/DL (ref 8–28)
BUN SERPL-MCNC: 38 MG/DL (ref 8–28)
BUN SERPL-MCNC: 40 MG/DL (ref 8–28)
BUN SERPL-MCNC: 42 MG/DL (ref 8–28)
BUN SERPL-MCNC: 47 MG/DL (ref 8–28)
BUN SERPL-MCNC: 52 MG/DL (ref 8–28)
BUN SERPL-MCNC: 64 MG/DL (ref 8–28)
C REACTIVE PROTEIN LHE: 1.9 MG/DL (ref 0–0.8)
CALCIUM SERPL-MCNC: 8.6 MG/DL (ref 8.5–10.5)
CALCIUM SERPL-MCNC: 8.7 MG/DL (ref 8.5–10.5)
CALCIUM SERPL-MCNC: 8.8 MG/DL (ref 8.5–10.5)
CALCIUM SERPL-MCNC: 8.9 MG/DL (ref 8.5–10.5)
CALCIUM SERPL-MCNC: 9.3 MG/DL (ref 8.5–10.5)
CALCIUM SERPL-MCNC: 9.5 MG/DL (ref 8.5–10.5)
CHLORIDE BLD-SCNC: 100 MMOL/L (ref 98–107)
CHLORIDE BLD-SCNC: 103 MMOL/L (ref 98–107)
CHLORIDE BLD-SCNC: 91 MMOL/L (ref 98–107)
CHLORIDE BLD-SCNC: 94 MMOL/L (ref 98–107)
CHLORIDE BLD-SCNC: 97 MMOL/L (ref 98–107)
CHLORIDE BLD-SCNC: 99 MMOL/L (ref 98–107)
CHLORIDE BLD-SCNC: 99 MMOL/L (ref 98–107)
CHOLEST SERPL-MCNC: 116 MG/DL
CO2 SERPL-SCNC: 27 MMOL/L (ref 22–31)
CO2 SERPL-SCNC: 28 MMOL/L (ref 22–31)
CO2 SERPL-SCNC: 29 MMOL/L (ref 22–31)
CO2 SERPL-SCNC: 30 MMOL/L (ref 22–31)
CO2 SERPL-SCNC: 31 MMOL/L (ref 22–31)
CO2 SERPL-SCNC: 32 MMOL/L (ref 22–31)
CREAT SERPL-MCNC: 1.46 MG/DL (ref 0.7–1.3)
CREAT SERPL-MCNC: 1.58 MG/DL (ref 0.7–1.3)
CREAT SERPL-MCNC: 1.64 MG/DL (ref 0.7–1.3)
CREAT SERPL-MCNC: 1.66 MG/DL (ref 0.7–1.3)
CREAT SERPL-MCNC: 1.71 MG/DL (ref 0.7–1.3)
CREAT SERPL-MCNC: 1.71 MG/DL (ref 0.7–1.3)
CREAT SERPL-MCNC: 1.86 MG/DL (ref 0.7–1.3)
CREAT SERPL-MCNC: 2.12 MG/DL (ref 0.7–1.3)
CREAT SERPL-MCNC: 2.17 MG/DL (ref 0.7–1.3)
CREAT SERPL-MCNC: 2.19 MG/DL (ref 0.7–1.3)
CREAT SERPL-MCNC: 2.41 MG/DL (ref 0.7–1.3)
CV STRESS CURRENT BP HE: NORMAL
CV STRESS CURRENT HR HE: 50
CV STRESS CURRENT HR HE: 51
CV STRESS CURRENT HR HE: 52
CV STRESS CURRENT HR HE: 52
CV STRESS CURRENT HR HE: 61
CV STRESS CURRENT HR HE: 64
CV STRESS CURRENT HR HE: 65
CV STRESS CURRENT HR HE: 65
CV STRESS CURRENT HR HE: 66
CV STRESS CURRENT HR HE: 67
CV STRESS CURRENT HR HE: NORMAL
CV STRESS DEVIATION TIME HE: NORMAL
CV STRESS ECHO PERCENT HR HE: NORMAL
CV STRESS EXERCISE STAGE HE: NORMAL
CV STRESS FINAL RESTING BP HE: NORMAL
CV STRESS FINAL RESTING HR HE: 65
CV STRESS MAX HR HE: 67
CV STRESS MAX TREADMILL GRADE HE: 10
CV STRESS MAX TREADMILL SPEED HE: 1.7
CV STRESS PEAK DIA BP HE: NORMAL
CV STRESS PEAK SYS BP HE: NORMAL
CV STRESS PHASE HE: NORMAL
CV STRESS PROTOCOL HE: NORMAL
CV STRESS RESTING PT POSITION HE: NORMAL
CV STRESS RESTING PT POSITION HE: NORMAL
CV STRESS ST DEVIATION AMOUNT HE: NORMAL
CV STRESS ST DEVIATION ELEVATION HE: NORMAL
CV STRESS ST EVELATION AMOUNT HE: NORMAL
CV STRESS TEST TYPE HE: NORMAL
CV STRESS TOTAL STAGE TIME MIN 1 HE: NORMAL
DIASTOLIC BLOOD PRESSURE - MUSE: NORMAL
DIASTOLIC BLOOD PRESSURE - MUSE: NORMAL MMHG
DIASTOLIC BLOOD PRESSURE - MUSE: NORMAL MMHG
DLCOCOR-%PRED-PRE: 62 %
DLCOCOR-PRE: 14.74 ML/MIN/MMHG
DLCOUNC-%PRED-PRE: 58 %
DLCOUNC-PRE: 13.83 ML/MIN/MMHG
DLCOUNC-PRED: 23.56 ML/MIN/MMHG
EOSINOPHIL # BLD AUTO: 0 10E3/UL (ref 0–0.7)
EOSINOPHIL # BLD AUTO: 0.1 10E3/UL (ref 0–0.7)
EOSINOPHIL NFR BLD AUTO: 0 %
EOSINOPHIL NFR BLD AUTO: 1 %
ERV-%PRED-PRE: 48 %
ERV-PRE: 0.7 L
ERV-PRED: 1.44 L
ERYTHROCYTE [DISTWIDTH] IN BLOOD BY AUTOMATED COUNT: 13.1 % (ref 11–14.5)
ERYTHROCYTE [DISTWIDTH] IN BLOOD BY AUTOMATED COUNT: 13.3 % (ref 11–14.5)
ERYTHROCYTE [DISTWIDTH] IN BLOOD BY AUTOMATED COUNT: 13.5 % (ref 10–15)
ERYTHROCYTE [DISTWIDTH] IN BLOOD BY AUTOMATED COUNT: 13.6 % (ref 10–15)
ERYTHROCYTE [DISTWIDTH] IN BLOOD BY AUTOMATED COUNT: 13.7 % (ref 10–15)
ERYTHROCYTE [DISTWIDTH] IN BLOOD BY AUTOMATED COUNT: 13.7 % (ref 10–15)
EXPTIME-PRE: 7.33 SEC
FASTING STATUS PATIENT QL REPORTED: YES
FEF2575-%PRED-POST: 29 %
FEF2575-%PRED-PRE: 23 %
FEF2575-POST: 0.6 L/SEC
FEF2575-PRE: 0.48 L/SEC
FEF2575-PRED: 2.03 L/SEC
FEFMAX-%PRED-PRE: 44 %
FEFMAX-PRE: 3.19 L/SEC
FEFMAX-PRED: 7.15 L/SEC
FEV1-%PRED-PRE: 39 %
FEV1-PRE: 1.11 L
FEV1FEV6-PRE: 49 %
FEV1FEV6-PRED: 77 %
FEV1FVC-PRE: 49 %
FEV1FVC-PRED: 75 %
FEV1SVC-PRE: 44 %
FEV1SVC-PRED: 65 %
FIFMAX-PRE: 3.58 L/SEC
FRCPLETH-%PRED-PRE: 157 %
FRCPLETH-PRE: 5.81 L
FRCPLETH-PRED: 3.68 L
FVC-%PRED-PRE: 60 %
FVC-PRE: 2.26 L
FVC-PRED: 3.76 L
GFR SERPL CREATININE-BSD FRML MDRD: 26 ML/MIN/1.73M2
GFR SERPL CREATININE-BSD FRML MDRD: 29 ML/MIN/1.73M2
GFR SERPL CREATININE-BSD FRML MDRD: 29 ML/MIN/1.73M2
GFR SERPL CREATININE-BSD FRML MDRD: 30 ML/MIN/1.73M2
GFR SERPL CREATININE-BSD FRML MDRD: 34 ML/MIN/1.73M2
GFR SERPL CREATININE-BSD FRML MDRD: 38 ML/MIN/1.73M2
GFR SERPL CREATININE-BSD FRML MDRD: 38 ML/MIN/1.73M2
GFR SERPL CREATININE-BSD FRML MDRD: 39 ML/MIN/1.73M2
GFR SERPL CREATININE-BSD FRML MDRD: 39 ML/MIN/1.73M2
GFR SERPL CREATININE-BSD FRML MDRD: 43 ML/MIN/1.73M2
GFR SERPL CREATININE-BSD FRML MDRD: 45 ML/MIN/1.73M2
GLUCOSE BLD-MCNC: 102 MG/DL (ref 70–125)
GLUCOSE BLD-MCNC: 104 MG/DL (ref 70–125)
GLUCOSE BLD-MCNC: 114 MG/DL (ref 70–125)
GLUCOSE BLD-MCNC: 118 MG/DL (ref 70–125)
GLUCOSE BLD-MCNC: 136 MG/DL (ref 70–125)
GLUCOSE BLD-MCNC: 141 MG/DL (ref 70–125)
GLUCOSE BLD-MCNC: 141 MG/DL (ref 70–125)
GLUCOSE BLD-MCNC: 97 MG/DL (ref 70–125)
HCO3 BLDV-SCNC: 28 MMOL/L (ref 24–30)
HCT VFR BLD AUTO: 37.4 % (ref 40–53)
HCT VFR BLD AUTO: 38.6 % (ref 40–53)
HCT VFR BLD AUTO: 38.6 % (ref 40–54)
HCT VFR BLD AUTO: 40.3 % (ref 40–53)
HCT VFR BLD AUTO: 40.4 % (ref 40–54)
HCT VFR BLD AUTO: 40.8 % (ref 40–53)
HDLC SERPL-MCNC: 36 MG/DL
HGB BLD-MCNC: 12.3 G/DL (ref 13.3–17.7)
HGB BLD-MCNC: 12.5 G/DL (ref 14–18)
HGB BLD-MCNC: 12.6 G/DL (ref 13.3–17.7)
HGB BLD-MCNC: 12.7 G/DL (ref 13.3–17.7)
HGB BLD-MCNC: 13.3 G/DL (ref 14–18)
HGB BLD-MCNC: 13.5 G/DL (ref 13.3–17.7)
HGB BLD-MCNC: 13.6 G/DL (ref 13.3–17.7)
IC-%PRED-PRE: 62 %
IC-PRE: 1.78 L
IC-PRED: 2.87 L
IMM GRANULOCYTES # BLD: 0.1 10E3/UL
IMM GRANULOCYTES # BLD: 0.2 10E3/UL
IMM GRANULOCYTES NFR BLD: 1 %
IMM GRANULOCYTES NFR BLD: 1 %
INTERPRETATION ECG - MUSE: NORMAL
LDLC SERPL CALC-MCNC: 65 MG/DL
LYMPHOCYTES # BLD AUTO: 0.9 10E3/UL (ref 0.8–5.3)
LYMPHOCYTES # BLD AUTO: 1.3 10E3/UL (ref 0.8–5.3)
LYMPHOCYTES NFR BLD AUTO: 11 %
LYMPHOCYTES NFR BLD AUTO: 8 %
MAGNESIUM SERPL-MCNC: 1.9 MG/DL (ref 1.8–2.6)
MAGNESIUM SERPL-MCNC: 1.9 MG/DL (ref 1.8–2.6)
MAGNESIUM SERPL-MCNC: 2.1 MG/DL (ref 1.8–2.6)
MAGNESIUM SERPL-MCNC: 2.1 MG/DL (ref 1.8–2.6)
MAGNESIUM SERPL-MCNC: 2.3 MG/DL (ref 1.8–2.6)
MCH RBC QN AUTO: 28.7 PG (ref 26.5–33)
MCH RBC QN AUTO: 28.7 PG (ref 26.5–33)
MCH RBC QN AUTO: 29.3 PG (ref 26.5–33)
MCH RBC QN AUTO: 29.6 PG (ref 26.5–33)
MCH RBC QN AUTO: 29.6 PG (ref 27–34)
MCH RBC QN AUTO: 29.8 PG (ref 27–34)
MCHC RBC AUTO-ENTMCNC: 31.9 G/DL (ref 31.5–36.5)
MCHC RBC AUTO-ENTMCNC: 32.4 G/DL (ref 32–36)
MCHC RBC AUTO-ENTMCNC: 32.9 G/DL (ref 32–36)
MCHC RBC AUTO-ENTMCNC: 33.1 G/DL (ref 31.5–36.5)
MCHC RBC AUTO-ENTMCNC: 33.7 G/DL (ref 31.5–36.5)
MCHC RBC AUTO-ENTMCNC: 34 G/DL (ref 31.5–36.5)
MCV RBC AUTO: 87 FL (ref 78–100)
MCV RBC AUTO: 90 FL (ref 78–100)
MCV RBC AUTO: 90 FL (ref 80–100)
MCV RBC AUTO: 92 FL (ref 80–100)
MONOCYTES # BLD AUTO: 0.3 10E3/UL (ref 0–1.3)
MONOCYTES # BLD AUTO: 1.2 10E3/UL (ref 0–1.3)
MONOCYTES NFR BLD AUTO: 15 %
MONOCYTES NFR BLD AUTO: 2 %
NEUTROPHILS # BLD AUTO: 15.4 10E3/UL (ref 1.6–8.3)
NEUTROPHILS # BLD AUTO: 5.9 10E3/UL (ref 1.6–8.3)
NEUTROPHILS NFR BLD AUTO: 71 %
NEUTROPHILS NFR BLD AUTO: 90 %
NRBC # BLD AUTO: 0 10E3/UL
NRBC BLD AUTO-RTO: 0 /100
NT-PROBNP SERPL-MCNC: 3045 PG/ML (ref 0–450)
NT-PROBNP SERPL-MCNC: 3383 PG/ML (ref 0–1800)
NUC STRESS EJECTION FRACTION: 58 %
OXYHGB MFR BLDV: 89.9 % (ref 70–75)
P AXIS - MUSE: 81 DEGREES
P AXIS - MUSE: 89 DEGREES
P AXIS - MUSE: 94 DEGREES
PCO2 BLDV: 51 MM HG (ref 35–50)
PH BLDV: 7.38 [PH] (ref 7.35–7.45)
PLATELET # BLD AUTO: 209 THOU/UL (ref 140–440)
PLATELET # BLD AUTO: 227 10E3/UL (ref 150–450)
PLATELET # BLD AUTO: 236 THOU/UL (ref 140–440)
PLATELET # BLD AUTO: 259 10E3/UL (ref 150–450)
PLATELET # BLD AUTO: 364 10E3/UL (ref 150–450)
PLATELET # BLD AUTO: 394 10E3/UL (ref 150–450)
PMV BLD AUTO: 10.1 FL (ref 7–10)
PMV BLD AUTO: 9.4 FL (ref 7–10)
PO2 BLDV: 61 MM HG (ref 25–47)
POTASSIUM BLD-SCNC: 4 MMOL/L (ref 3.5–5)
POTASSIUM BLD-SCNC: 4.1 MMOL/L (ref 3.5–5)
POTASSIUM BLD-SCNC: 4.2 MMOL/L (ref 3.5–5)
POTASSIUM BLD-SCNC: 4.2 MMOL/L (ref 3.5–5)
POTASSIUM BLD-SCNC: 4.4 MMOL/L (ref 3.5–5)
POTASSIUM BLD-SCNC: 4.5 MMOL/L (ref 3.5–5)
POTASSIUM BLD-SCNC: 4.5 MMOL/L (ref 3.5–5)
POTASSIUM BLD-SCNC: 4.6 MMOL/L (ref 3.5–5)
POTASSIUM BLD-SCNC: 4.6 MMOL/L (ref 3.5–5)
POTASSIUM BLD-SCNC: 5.1 MMOL/L (ref 3.5–5)
POTASSIUM BLD-SCNC: 5.5 MMOL/L (ref 3.5–5)
PR INTERVAL - MUSE: 188 MS
PR INTERVAL - MUSE: 218 MS
PR INTERVAL - MUSE: 224 MS
PROT SERPL-MCNC: 6.2 G/DL (ref 6–8)
PROT SERPL-MCNC: 6.2 G/DL (ref 6–8)
PROT SERPL-MCNC: 6.3 G/DL (ref 6–8)
PROT SERPL-MCNC: 6.5 G/DL (ref 6–8)
QRS DURATION - MUSE: 78 MS
QRS DURATION - MUSE: 78 MS
QRS DURATION - MUSE: 90 MS
QT - MUSE: 394 MS
QT - MUSE: 432 MS
QT - MUSE: 436 MS
QTC - MUSE: 452 MS
QTC - MUSE: 465 MS
QTC - MUSE: 467 MS
R AXIS - MUSE: 22 DEGREES
R AXIS - MUSE: 45 DEGREES
R AXIS - MUSE: 52 DEGREES
RATE PRESSURE PRODUCT: 7571
RBC # BLD AUTO: 4.2 MILL/UL (ref 4.4–6.2)
RBC # BLD AUTO: 4.28 10E6/UL (ref 4.4–5.9)
RBC # BLD AUTO: 4.29 10E6/UL (ref 4.4–5.9)
RBC # BLD AUTO: 4.49 MILL/UL (ref 4.4–6.2)
RBC # BLD AUTO: 4.64 10E6/UL (ref 4.4–5.9)
RBC # BLD AUTO: 4.71 10E6/UL (ref 4.4–5.9)
RVPLETH-%PRED-PRE: 183 %
RVPLETH-PRE: 5.06 L
RVPLETH-PRED: 2.77 L
SAO2 % BLDV: 91.3 % (ref 70–75)
SARS-COV-2 RNA RESP QL NAA+PROBE: NEGATIVE
SODIUM SERPL-SCNC: 128 MMOL/L (ref 136–145)
SODIUM SERPL-SCNC: 129 MMOL/L (ref 136–145)
SODIUM SERPL-SCNC: 132 MMOL/L (ref 136–145)
SODIUM SERPL-SCNC: 137 MMOL/L (ref 136–145)
SODIUM SERPL-SCNC: 139 MMOL/L (ref 136–145)
SODIUM SERPL-SCNC: 141 MMOL/L (ref 136–145)
STRESS ECHO BASELINE DIASTOLIC HE: 54
STRESS ECHO BASELINE SYSTOLIC BP: 112
STRESS ECHO CALCULATED PERCENT HR: 47 %
STRESS ECHO LAST STRESS DIASTOLIC BP: 50
STRESS ECHO LAST STRESS HR: 66
STRESS ECHO LAST STRESS SYSTOLIC BP: 113
STRESS ECHO POST ESTIMATED WORKLOAD: 4.7
STRESS ECHO POST EXERCISE DUR MIN: 1
STRESS ECHO POST EXERCISE DUR SEC: 7
STRESS ECHO TARGET HR: 142
SYSTOLIC BLOOD PRESSURE - MUSE: NORMAL
SYSTOLIC BLOOD PRESSURE - MUSE: NORMAL MMHG
SYSTOLIC BLOOD PRESSURE - MUSE: NORMAL MMHG
T AXIS - MUSE: 63 DEGREES
T AXIS - MUSE: 84 DEGREES
T AXIS - MUSE: 92 DEGREES
TLCPLETH-%PRED-PRE: 111 %
TLCPLETH-PRE: 7.59 L
TLCPLETH-PRED: 6.82 L
TRIGL SERPL-MCNC: 73 MG/DL
TROPONIN I SERPL-MCNC: 0.04 NG/ML (ref 0–0.29)
TROPONIN I SERPL-MCNC: 0.04 NG/ML (ref 0–0.29)
VA-%PRED-PRE: 85 %
VA-PRE: 5.09 L
VC-%PRED-PRE: 58 %
VC-PRE: 2.53 L
VC-PRED: 4.31 L
VENTRICULAR RATE- MUSE: 66 BPM
VENTRICULAR RATE- MUSE: 69 BPM
VENTRICULAR RATE- MUSE: 84 BPM
WBC # BLD AUTO: 11.8 10E3/UL (ref 4–11)
WBC # BLD AUTO: 17.2 10E3/UL (ref 4–11)
WBC # BLD AUTO: 6.9 10E3/UL (ref 4–11)
WBC # BLD AUTO: 8.1 10E3/UL (ref 4–11)
WBC: 8.6 THOU/UL (ref 4–11)
WBC: 9.5 THOU/UL (ref 4–11)

## 2021-01-01 PROCEDURE — 84132 ASSAY OF SERUM POTASSIUM: CPT | Performed by: FAMILY MEDICINE

## 2021-01-01 PROCEDURE — 36592 COLLECT BLOOD FROM PICC: CPT | Performed by: EMERGENCY MEDICINE

## 2021-01-01 PROCEDURE — 94726 PLETHYSMOGRAPHY LUNG VOLUMES: CPT

## 2021-01-01 PROCEDURE — 250N000013 HC RX MED GY IP 250 OP 250 PS 637: Performed by: INTERNAL MEDICINE

## 2021-01-01 PROCEDURE — 83880 ASSAY OF NATRIURETIC PEPTIDE: CPT | Performed by: INTERNAL MEDICINE

## 2021-01-01 PROCEDURE — 83735 ASSAY OF MAGNESIUM: CPT | Performed by: FAMILY MEDICINE

## 2021-01-01 PROCEDURE — 0004A COVID-19,PF,PFIZER (12+ YRS): CPT | Performed by: INTERNAL MEDICINE

## 2021-01-01 PROCEDURE — 96366 THER/PROPH/DIAG IV INF ADDON: CPT

## 2021-01-01 PROCEDURE — 94640 AIRWAY INHALATION TREATMENT: CPT

## 2021-01-01 PROCEDURE — 85025 COMPLETE CBC W/AUTO DIFF WBC: CPT | Performed by: EMERGENCY MEDICINE

## 2021-01-01 PROCEDURE — 250N000013 HC RX MED GY IP 250 OP 250 PS 637: Performed by: FAMILY MEDICINE

## 2021-01-01 PROCEDURE — 83880 ASSAY OF NATRIURETIC PEPTIDE: CPT | Performed by: EMERGENCY MEDICINE

## 2021-01-01 PROCEDURE — 250N000009 HC RX 250: Performed by: FAMILY MEDICINE

## 2021-01-01 PROCEDURE — 99233 SBSQ HOSP IP/OBS HIGH 50: CPT | Performed by: FAMILY MEDICINE

## 2021-01-01 PROCEDURE — 94060 EVALUATION OF WHEEZING: CPT

## 2021-01-01 PROCEDURE — 85027 COMPLETE CBC AUTOMATED: CPT | Performed by: FAMILY MEDICINE

## 2021-01-01 PROCEDURE — 94640 AIRWAY INHALATION TREATMENT: CPT | Mod: 76

## 2021-01-01 PROCEDURE — 83735 ASSAY OF MAGNESIUM: CPT | Performed by: INTERNAL MEDICINE

## 2021-01-01 PROCEDURE — 94664 DEMO&/EVAL PT USE INHALER: CPT

## 2021-01-01 PROCEDURE — 36415 COLL VENOUS BLD VENIPUNCTURE: CPT | Performed by: FAMILY MEDICINE

## 2021-01-01 PROCEDURE — 99207 PR NO CHARGE LOS: CPT | Mod: 95 | Performed by: INTERNAL MEDICINE

## 2021-01-01 PROCEDURE — 999N000157 HC STATISTIC RCP TIME EA 10 MIN

## 2021-01-01 PROCEDURE — 99214 OFFICE O/P EST MOD 30 MIN: CPT | Performed by: INTERNAL MEDICINE

## 2021-01-01 PROCEDURE — 250N000011 HC RX IP 250 OP 636: Performed by: EMERGENCY MEDICINE

## 2021-01-01 PROCEDURE — 94060 EVALUATION OF WHEEZING: CPT | Mod: 26 | Performed by: INTERNAL MEDICINE

## 2021-01-01 PROCEDURE — 80048 BASIC METABOLIC PNL TOTAL CA: CPT | Performed by: FAMILY MEDICINE

## 2021-01-01 PROCEDURE — 91300 COVID-19,PF,PFIZER (12+ YRS): CPT | Performed by: INTERNAL MEDICINE

## 2021-01-01 PROCEDURE — 94729 DIFFUSING CAPACITY: CPT

## 2021-01-01 PROCEDURE — 250N000011 HC RX IP 250 OP 636: Performed by: FAMILY MEDICINE

## 2021-01-01 PROCEDURE — 86141 C-REACTIVE PROTEIN HS: CPT | Performed by: EMERGENCY MEDICINE

## 2021-01-01 PROCEDURE — 96365 THER/PROPH/DIAG IV INF INIT: CPT | Mod: 59

## 2021-01-01 PROCEDURE — 84484 ASSAY OF TROPONIN QUANT: CPT | Performed by: EMERGENCY MEDICINE

## 2021-01-01 PROCEDURE — 36415 COLL VENOUS BLD VENIPUNCTURE: CPT | Performed by: NURSE PRACTITIONER

## 2021-01-01 PROCEDURE — 99233 SBSQ HOSP IP/OBS HIGH 50: CPT | Performed by: INTERNAL MEDICINE

## 2021-01-01 PROCEDURE — 80048 BASIC METABOLIC PNL TOTAL CA: CPT | Performed by: NURSE PRACTITIONER

## 2021-01-01 PROCEDURE — 99214 OFFICE O/P EST MOD 30 MIN: CPT | Mod: 25 | Performed by: INTERNAL MEDICINE

## 2021-01-01 PROCEDURE — 97116 GAIT TRAINING THERAPY: CPT | Mod: GP | Performed by: PHYSICAL THERAPIST

## 2021-01-01 PROCEDURE — 99233 SBSQ HOSP IP/OBS HIGH 50: CPT | Mod: 25 | Performed by: INTERNAL MEDICINE

## 2021-01-01 PROCEDURE — 80053 COMPREHEN METABOLIC PANEL: CPT | Performed by: INTERNAL MEDICINE

## 2021-01-01 PROCEDURE — 85025 COMPLETE CBC W/AUTO DIFF WBC: CPT | Performed by: NURSE PRACTITIONER

## 2021-01-01 PROCEDURE — 250N000009 HC RX 250: Performed by: EMERGENCY MEDICINE

## 2021-01-01 PROCEDURE — 36415 COLL VENOUS BLD VENIPUNCTURE: CPT | Performed by: INTERNAL MEDICINE

## 2021-01-01 PROCEDURE — 99223 1ST HOSP IP/OBS HIGH 75: CPT | Performed by: FAMILY MEDICINE

## 2021-01-01 PROCEDURE — 99204 OFFICE O/P NEW MOD 45 MIN: CPT | Performed by: INTERNAL MEDICINE

## 2021-01-01 PROCEDURE — 71275 CT ANGIOGRAPHY CHEST: CPT

## 2021-01-01 PROCEDURE — 99223 1ST HOSP IP/OBS HIGH 75: CPT | Mod: 25 | Performed by: INTERNAL MEDICINE

## 2021-01-01 PROCEDURE — 97162 PT EVAL MOD COMPLEX 30 MIN: CPT | Mod: GP | Performed by: PHYSICAL THERAPIST

## 2021-01-01 PROCEDURE — 82805 BLOOD GASES W/O2 SATURATION: CPT | Performed by: FAMILY MEDICINE

## 2021-01-01 PROCEDURE — 94729 DIFFUSING CAPACITY: CPT | Mod: 26 | Performed by: INTERNAL MEDICINE

## 2021-01-01 PROCEDURE — 5A09457 ASSISTANCE WITH RESPIRATORY VENTILATION, 24-96 CONSECUTIVE HOURS, CONTINUOUS POSITIVE AIRWAY PRESSURE: ICD-10-PCS | Performed by: EMERGENCY MEDICINE

## 2021-01-01 PROCEDURE — 250N000011 HC RX IP 250 OP 636: Performed by: INTERNAL MEDICINE

## 2021-01-01 PROCEDURE — 85018 HEMOGLOBIN: CPT | Performed by: FAMILY MEDICINE

## 2021-01-01 PROCEDURE — 94660 CPAP INITIATION&MGMT: CPT

## 2021-01-01 PROCEDURE — 94726 PLETHYSMOGRAPHY LUNG VOLUMES: CPT | Mod: 26 | Performed by: INTERNAL MEDICINE

## 2021-01-01 PROCEDURE — 80048 BASIC METABOLIC PNL TOTAL CA: CPT | Performed by: EMERGENCY MEDICINE

## 2021-01-01 PROCEDURE — 200N000001 HC R&B ICU

## 2021-01-01 PROCEDURE — 83880 ASSAY OF NATRIURETIC PEPTIDE: CPT | Performed by: FAMILY MEDICINE

## 2021-01-01 PROCEDURE — G0438 PPPS, INITIAL VISIT: HCPCS | Performed by: INTERNAL MEDICINE

## 2021-01-01 PROCEDURE — 93308 TTE F-UP OR LMTD: CPT

## 2021-01-01 PROCEDURE — 92014 COMPRE OPH EXAM EST PT 1/>: CPT | Mod: GC | Performed by: OPHTHALMOLOGY

## 2021-01-01 PROCEDURE — 71045 X-RAY EXAM CHEST 1 VIEW: CPT

## 2021-01-01 PROCEDURE — 99285 EMERGENCY DEPT VISIT HI MDM: CPT | Mod: 25

## 2021-01-01 PROCEDURE — 87635 SARS-COV-2 COVID-19 AMP PRB: CPT | Performed by: EMERGENCY MEDICINE

## 2021-01-01 PROCEDURE — 84484 ASSAY OF TROPONIN QUANT: CPT | Performed by: INTERNAL MEDICINE

## 2021-01-01 PROCEDURE — 80048 BASIC METABOLIC PNL TOTAL CA: CPT | Performed by: INTERNAL MEDICINE

## 2021-01-01 PROCEDURE — 93005 ELECTROCARDIOGRAM TRACING: CPT | Performed by: EMERGENCY MEDICINE

## 2021-01-01 PROCEDURE — C9803 HOPD COVID-19 SPEC COLLECT: HCPCS

## 2021-01-01 PROCEDURE — 99495 TRANSJ CARE MGMT MOD F2F 14D: CPT | Performed by: NURSE PRACTITIONER

## 2021-01-01 PROCEDURE — 93005 ELECTROCARDIOGRAM TRACING: CPT

## 2021-01-01 PROCEDURE — 96368 THER/DIAG CONCURRENT INF: CPT

## 2021-01-01 PROCEDURE — 96375 TX/PRO/DX INJ NEW DRUG ADDON: CPT

## 2021-01-01 PROCEDURE — 85027 COMPLETE CBC AUTOMATED: CPT | Performed by: INTERNAL MEDICINE

## 2021-01-01 PROCEDURE — 250N000012 HC RX MED GY IP 250 OP 636 PS 637: Performed by: FAMILY MEDICINE

## 2021-01-01 RX ORDER — TAMSULOSIN HYDROCHLORIDE 0.4 MG/1
0.4 CAPSULE ORAL DAILY
Status: DISCONTINUED | OUTPATIENT
Start: 2021-01-01 | End: 2021-01-01 | Stop reason: HOSPADM

## 2021-01-01 RX ORDER — GUAIFENESIN 600 MG/1
1200 TABLET, EXTENDED RELEASE ORAL 2 TIMES DAILY
Qty: 20 TABLET | Refills: 0 | Status: SHIPPED | OUTPATIENT
Start: 2021-01-01

## 2021-01-01 RX ORDER — FUROSEMIDE 40 MG
40 TABLET ORAL 2 TIMES DAILY
Qty: 60 TABLET | Refills: 0 | Status: SHIPPED | OUTPATIENT
Start: 2021-01-01 | End: 2021-01-01

## 2021-01-01 RX ORDER — AMLODIPINE BESYLATE 10 MG/1
10 TABLET ORAL DAILY
COMMUNITY
End: 2021-01-01

## 2021-01-01 RX ORDER — PEAK FLOW METER
EACH MISCELLANEOUS
COMMUNITY
Start: 2021-01-01

## 2021-01-01 RX ORDER — MIRTAZAPINE 15 MG/1
30 TABLET, FILM COATED ORAL AT BEDTIME
Status: DISCONTINUED | OUTPATIENT
Start: 2021-01-01 | End: 2021-01-01 | Stop reason: HOSPADM

## 2021-01-01 RX ORDER — IPRATROPIUM BROMIDE AND ALBUTEROL SULFATE 2.5; .5 MG/3ML; MG/3ML
SOLUTION RESPIRATORY (INHALATION)
Qty: 120 ML | Refills: 1 | Status: SHIPPED | OUTPATIENT
Start: 2021-01-01 | End: 2021-01-01

## 2021-01-01 RX ORDER — ALBUTEROL SULFATE 90 UG/1
2 AEROSOL, METERED RESPIRATORY (INHALATION) EVERY 4 HOURS PRN
COMMUNITY
Start: 2021-01-01

## 2021-01-01 RX ORDER — GUAIFENESIN 600 MG/1
1200 TABLET, EXTENDED RELEASE ORAL 2 TIMES DAILY
Status: DISCONTINUED | OUTPATIENT
Start: 2021-01-01 | End: 2021-01-01 | Stop reason: HOSPADM

## 2021-01-01 RX ORDER — MAGNESIUM SULFATE HEPTAHYDRATE 40 MG/ML
2 INJECTION, SOLUTION INTRAVENOUS ONCE
Status: COMPLETED | OUTPATIENT
Start: 2021-01-01 | End: 2021-01-01

## 2021-01-01 RX ORDER — TIOTROPIUM BROMIDE 18 UG/1
2 CAPSULE ORAL; RESPIRATORY (INHALATION) DAILY
COMMUNITY
Start: 2021-01-01

## 2021-01-01 RX ORDER — PREDNISONE 10 MG/1
TABLET ORAL
Qty: 29 TABLET | Refills: 0 | Status: SHIPPED | OUTPATIENT
Start: 2021-01-01

## 2021-01-01 RX ORDER — ASPIRIN 81 MG/1
81 TABLET ORAL DAILY
COMMUNITY

## 2021-01-01 RX ORDER — FUROSEMIDE 10 MG/ML
40 INJECTION INTRAMUSCULAR; INTRAVENOUS ONCE
Status: COMPLETED | OUTPATIENT
Start: 2021-01-01 | End: 2021-01-01

## 2021-01-01 RX ORDER — TIOTROPIUM BROMIDE 18 UG/1
18 CAPSULE ORAL; RESPIRATORY (INHALATION) 2 TIMES DAILY
Status: DISCONTINUED | OUTPATIENT
Start: 2021-01-01 | End: 2021-01-01 | Stop reason: HOSPADM

## 2021-01-01 RX ORDER — ALBUTEROL SULFATE 0.83 MG/ML
2.5 SOLUTION RESPIRATORY (INHALATION)
Qty: 90 ML | Refills: 0 | Status: SHIPPED | OUTPATIENT
Start: 2021-01-01 | End: 2021-01-01

## 2021-01-01 RX ORDER — IOPAMIDOL 755 MG/ML
100 INJECTION, SOLUTION INTRAVASCULAR ONCE
Status: COMPLETED | OUTPATIENT
Start: 2021-01-01 | End: 2021-01-01

## 2021-01-01 RX ORDER — DOXYCYCLINE HYCLATE 50 MG/1
100 CAPSULE ORAL 2 TIMES DAILY
Status: DISCONTINUED | OUTPATIENT
Start: 2021-01-01 | End: 2021-01-01

## 2021-01-01 RX ORDER — DOXYCYCLINE 100 MG/10ML
100 INJECTION, POWDER, LYOPHILIZED, FOR SOLUTION INTRAVENOUS ONCE
Status: COMPLETED | OUTPATIENT
Start: 2021-01-01 | End: 2021-01-01

## 2021-01-01 RX ORDER — FUROSEMIDE 40 MG
40 TABLET ORAL
Status: DISCONTINUED | OUTPATIENT
Start: 2021-01-01 | End: 2021-01-01 | Stop reason: HOSPADM

## 2021-01-01 RX ORDER — PREDNISONE 20 MG/1
40 TABLET ORAL DAILY
Status: DISCONTINUED | OUTPATIENT
Start: 2021-01-01 | End: 2021-01-01

## 2021-01-01 RX ORDER — IPRATROPIUM BROMIDE AND ALBUTEROL SULFATE 2.5; .5 MG/3ML; MG/3ML
1 SOLUTION RESPIRATORY (INHALATION) 4 TIMES DAILY
Qty: 120 ML | Refills: 3 | Status: SHIPPED | OUTPATIENT
Start: 2021-01-01

## 2021-01-01 RX ORDER — CEFTRIAXONE 1 G/1
1 INJECTION, POWDER, FOR SOLUTION INTRAMUSCULAR; INTRAVENOUS EVERY 24 HOURS
Status: DISCONTINUED | OUTPATIENT
Start: 2021-01-01 | End: 2021-01-01 | Stop reason: HOSPADM

## 2021-01-01 RX ORDER — ASPIRIN 81 MG/1
81 TABLET ORAL DAILY
Status: DISCONTINUED | OUTPATIENT
Start: 2021-01-01 | End: 2021-01-01 | Stop reason: HOSPADM

## 2021-01-01 RX ORDER — AMLODIPINE BESYLATE 10 MG/1
5 TABLET ORAL DAILY
Start: 2021-01-01

## 2021-01-01 RX ORDER — AMLODIPINE BESYLATE 5 MG/1
10 TABLET ORAL DAILY
Status: DISCONTINUED | OUTPATIENT
Start: 2021-01-01 | End: 2021-01-01 | Stop reason: HOSPADM

## 2021-01-01 RX ORDER — SOTALOL HYDROCHLORIDE 80 MG/1
40 TABLET ORAL 2 TIMES DAILY
Start: 2021-01-01

## 2021-01-01 RX ORDER — ALBUTEROL SULFATE 0.83 MG/ML
2.5 SOLUTION RESPIRATORY (INHALATION) ONCE
Status: COMPLETED | OUTPATIENT
Start: 2021-01-01 | End: 2021-01-01

## 2021-01-01 RX ORDER — METHYLPREDNISOLONE SODIUM SUCCINATE 125 MG/2ML
125 INJECTION, POWDER, LYOPHILIZED, FOR SOLUTION INTRAMUSCULAR; INTRAVENOUS ONCE
Status: COMPLETED | OUTPATIENT
Start: 2021-01-01 | End: 2021-01-01

## 2021-01-01 RX ORDER — FUROSEMIDE 40 MG
40 TABLET ORAL 2 TIMES DAILY
Qty: 180 TABLET | Refills: 3 | Status: SHIPPED | OUTPATIENT
Start: 2021-01-01

## 2021-01-01 RX ORDER — LANOLIN ALCOHOL/MO/W.PET/CERES
1000 CREAM (GRAM) TOPICAL DAILY
Status: DISCONTINUED | OUTPATIENT
Start: 2021-01-01 | End: 2021-01-01 | Stop reason: HOSPADM

## 2021-01-01 RX ORDER — IPRATROPIUM BROMIDE AND ALBUTEROL SULFATE 2.5; .5 MG/3ML; MG/3ML
3 SOLUTION RESPIRATORY (INHALATION)
Status: DISCONTINUED | OUTPATIENT
Start: 2021-01-01 | End: 2021-01-01 | Stop reason: HOSPADM

## 2021-01-01 RX ORDER — CYANOCOBALAMIN (VITAMIN B-12) 500 MCG
400 LOZENGE ORAL DAILY
COMMUNITY

## 2021-01-01 RX ORDER — DOXYCYCLINE 100 MG/1
100 TABLET ORAL 2 TIMES DAILY
Qty: 14 TABLET | Refills: 0 | Status: SHIPPED | OUTPATIENT
Start: 2021-01-01 | End: 2021-01-01

## 2021-01-01 RX ORDER — CEFDINIR 300 MG/1
300 CAPSULE ORAL 2 TIMES DAILY
Qty: 14 CAPSULE | Refills: 0 | Status: SHIPPED | OUTPATIENT
Start: 2021-01-01 | End: 2021-01-01

## 2021-01-01 RX ORDER — ATORVASTATIN CALCIUM 40 MG/1
40 TABLET, FILM COATED ORAL EVERY EVENING
Status: DISCONTINUED | OUTPATIENT
Start: 2021-01-01 | End: 2021-01-01 | Stop reason: HOSPADM

## 2021-01-01 RX ORDER — IPRATROPIUM BROMIDE AND ALBUTEROL SULFATE 2.5; .5 MG/3ML; MG/3ML
3 SOLUTION RESPIRATORY (INHALATION) ONCE
Status: COMPLETED | OUTPATIENT
Start: 2021-01-01 | End: 2021-01-01

## 2021-01-01 RX ORDER — ALBUTEROL SULFATE 0.83 MG/ML
2.5 SOLUTION RESPIRATORY (INHALATION)
Status: DISCONTINUED | OUTPATIENT
Start: 2021-01-01 | End: 2021-01-01 | Stop reason: HOSPADM

## 2021-01-01 RX ORDER — DOXYCYCLINE 100 MG/10ML
100 INJECTION, POWDER, LYOPHILIZED, FOR SOLUTION INTRAVENOUS EVERY 12 HOURS
Status: DISCONTINUED | OUTPATIENT
Start: 2021-01-01 | End: 2021-01-01 | Stop reason: HOSPADM

## 2021-01-01 RX ORDER — MULTIVITAMIN,THERAPEUTIC
1 TABLET ORAL DAILY
COMMUNITY

## 2021-01-01 RX ORDER — HYDRALAZINE HYDROCHLORIDE 20 MG/ML
10 INJECTION INTRAMUSCULAR; INTRAVENOUS EVERY 6 HOURS PRN
Status: DISCONTINUED | OUTPATIENT
Start: 2021-01-01 | End: 2021-01-01 | Stop reason: HOSPADM

## 2021-01-01 RX ORDER — BENZONATATE 100 MG/1
100 CAPSULE ORAL 3 TIMES DAILY PRN
Status: DISCONTINUED | OUTPATIENT
Start: 2021-01-01 | End: 2021-01-01 | Stop reason: HOSPADM

## 2021-01-01 RX ORDER — ALBUTEROL SULFATE 90 UG/1
2 AEROSOL, METERED RESPIRATORY (INHALATION) EVERY 4 HOURS PRN
Status: DISCONTINUED | OUTPATIENT
Start: 2021-01-01 | End: 2021-01-01 | Stop reason: HOSPADM

## 2021-01-01 RX ORDER — ATORVASTATIN CALCIUM 40 MG/1
40 TABLET, FILM COATED ORAL EVERY EVENING
COMMUNITY
Start: 2021-01-01

## 2021-01-01 RX ORDER — METHYLPREDNISOLONE SODIUM SUCCINATE 40 MG/ML
40 INJECTION, POWDER, LYOPHILIZED, FOR SOLUTION INTRAMUSCULAR; INTRAVENOUS EVERY 8 HOURS
Status: DISCONTINUED | OUTPATIENT
Start: 2021-01-01 | End: 2021-01-01 | Stop reason: HOSPADM

## 2021-01-01 RX ORDER — FUROSEMIDE 10 MG/ML
60 INJECTION INTRAMUSCULAR; INTRAVENOUS EVERY 8 HOURS
Status: DISCONTINUED | OUTPATIENT
Start: 2021-01-01 | End: 2021-01-01

## 2021-01-01 RX ORDER — SOTALOL HYDROCHLORIDE 80 MG/1
80 TABLET ORAL 2 TIMES DAILY
Status: DISCONTINUED | OUTPATIENT
Start: 2021-01-01 | End: 2021-01-01

## 2021-01-01 RX ORDER — CLONIDINE HYDROCHLORIDE 0.1 MG/1
0.1 TABLET ORAL 2 TIMES DAILY
Status: DISCONTINUED | OUTPATIENT
Start: 2021-01-01 | End: 2021-01-01 | Stop reason: HOSPADM

## 2021-01-01 RX ADMIN — IPRATROPIUM BROMIDE AND ALBUTEROL SULFATE 3 ML: .5; 3 SOLUTION RESPIRATORY (INHALATION) at 15:52

## 2021-01-01 RX ADMIN — DOXYCYCLINE 100 MG: 100 INJECTION, POWDER, LYOPHILIZED, FOR SOLUTION INTRAVENOUS at 04:31

## 2021-01-01 RX ADMIN — FUROSEMIDE 60 MG: 10 INJECTION, SOLUTION INTRAMUSCULAR; INTRAVENOUS at 06:08

## 2021-01-01 RX ADMIN — SOTALOL HYDROCHLORIDE 40 MG: 80 TABLET ORAL at 10:15

## 2021-01-01 RX ADMIN — SOTALOL HYDROCHLORIDE 80 MG: 80 TABLET ORAL at 21:24

## 2021-01-01 RX ADMIN — GUAIFENESIN 1200 MG: 600 TABLET ORAL at 21:16

## 2021-01-01 RX ADMIN — IPRATROPIUM BROMIDE AND ALBUTEROL SULFATE 3 ML: .5; 3 SOLUTION RESPIRATORY (INHALATION) at 13:04

## 2021-01-01 RX ADMIN — ASPIRIN 81 MG: 81 TABLET, DELAYED RELEASE ORAL at 10:15

## 2021-01-01 RX ADMIN — FUROSEMIDE 60 MG: 10 INJECTION, SOLUTION INTRAMUSCULAR; INTRAVENOUS at 05:40

## 2021-01-01 RX ADMIN — SOTALOL HYDROCHLORIDE 40 MG: 80 TABLET ORAL at 08:58

## 2021-01-01 RX ADMIN — AMLODIPINE BESYLATE 10 MG: 5 TABLET ORAL at 10:15

## 2021-01-01 RX ADMIN — TAMSULOSIN HYDROCHLORIDE 0.4 MG: 0.4 CAPSULE ORAL at 19:11

## 2021-01-01 RX ADMIN — IPRATROPIUM BROMIDE AND ALBUTEROL SULFATE 3 ML: .5; 3 SOLUTION RESPIRATORY (INHALATION) at 07:56

## 2021-01-01 RX ADMIN — ALBUTEROL SULFATE 2.5 MG: 0.83 SOLUTION RESPIRATORY (INHALATION) at 11:27

## 2021-01-01 RX ADMIN — IPRATROPIUM BROMIDE AND ALBUTEROL SULFATE 3 ML: .5; 3 SOLUTION RESPIRATORY (INHALATION) at 08:07

## 2021-01-01 RX ADMIN — MIRTAZAPINE 30 MG: 15 TABLET, FILM COATED ORAL at 21:59

## 2021-01-01 RX ADMIN — IPRATROPIUM BROMIDE AND ALBUTEROL SULFATE 3 ML: .5; 3 SOLUTION RESPIRATORY (INHALATION) at 11:37

## 2021-01-01 RX ADMIN — METHYLPREDNISOLONE SODIUM SUCCINATE 125 MG: 125 INJECTION, POWDER, FOR SOLUTION INTRAMUSCULAR; INTRAVENOUS at 13:10

## 2021-01-01 RX ADMIN — FUROSEMIDE 40 MG: 40 TABLET ORAL at 09:48

## 2021-01-01 RX ADMIN — IPRATROPIUM BROMIDE AND ALBUTEROL SULFATE 3 ML: .5; 3 SOLUTION RESPIRATORY (INHALATION) at 19:32

## 2021-01-01 RX ADMIN — DOXYCYCLINE HYCLATE 100 MG: 50 CAPSULE ORAL at 08:58

## 2021-01-01 RX ADMIN — ATORVASTATIN CALCIUM 40 MG: 40 TABLET, FILM COATED ORAL at 21:59

## 2021-01-01 RX ADMIN — GUAIFENESIN 1200 MG: 600 TABLET ORAL at 10:15

## 2021-01-01 RX ADMIN — SOTALOL HYDROCHLORIDE 40 MG: 80 TABLET ORAL at 09:48

## 2021-01-01 RX ADMIN — ATORVASTATIN CALCIUM 40 MG: 40 TABLET, FILM COATED ORAL at 21:16

## 2021-01-01 RX ADMIN — ASPIRIN 81 MG: 81 TABLET, DELAYED RELEASE ORAL at 08:58

## 2021-01-01 RX ADMIN — IOPAMIDOL 56 ML: 755 INJECTION, SOLUTION INTRAVENOUS at 15:42

## 2021-01-01 RX ADMIN — DOXYCYCLINE 100 MG: 100 INJECTION, POWDER, LYOPHILIZED, FOR SOLUTION INTRAVENOUS at 18:26

## 2021-01-01 RX ADMIN — METHYLPREDNISOLONE SODIUM SUCCINATE 40 MG: 40 INJECTION, POWDER, FOR SOLUTION INTRAMUSCULAR; INTRAVENOUS at 09:49

## 2021-01-01 RX ADMIN — SOTALOL HYDROCHLORIDE 40 MG: 80 TABLET ORAL at 21:59

## 2021-01-01 RX ADMIN — FUROSEMIDE 40 MG: 40 TABLET ORAL at 17:08

## 2021-01-01 RX ADMIN — IPRATROPIUM BROMIDE AND ALBUTEROL SULFATE 3 ML: .5; 3 SOLUTION RESPIRATORY (INHALATION) at 16:23

## 2021-01-01 RX ADMIN — AMLODIPINE BESYLATE 10 MG: 5 TABLET ORAL at 09:47

## 2021-01-01 RX ADMIN — FUROSEMIDE 40 MG: 10 INJECTION, SOLUTION INTRAMUSCULAR; INTRAVENOUS at 15:39

## 2021-01-01 RX ADMIN — GUAIFENESIN 1200 MG: 600 TABLET ORAL at 21:59

## 2021-01-01 RX ADMIN — MIRTAZAPINE 30 MG: 15 TABLET, FILM COATED ORAL at 21:24

## 2021-01-01 RX ADMIN — IPRATROPIUM BROMIDE AND ALBUTEROL SULFATE 3 ML: .5; 3 SOLUTION RESPIRATORY (INHALATION) at 20:38

## 2021-01-01 RX ADMIN — IPRATROPIUM BROMIDE AND ALBUTEROL SULFATE 3 ML: .5; 3 SOLUTION RESPIRATORY (INHALATION) at 19:49

## 2021-01-01 RX ADMIN — DOXYCYCLINE HYCLATE 100 MG: 50 CAPSULE ORAL at 21:25

## 2021-01-01 RX ADMIN — RIVAROXABAN 15 MG: 15 TABLET, FILM COATED ORAL at 17:12

## 2021-01-01 RX ADMIN — FUROSEMIDE 60 MG: 10 INJECTION, SOLUTION INTRAMUSCULAR; INTRAVENOUS at 21:59

## 2021-01-01 RX ADMIN — DOXYCYCLINE 100 MG: 100 INJECTION, POWDER, LYOPHILIZED, FOR SOLUTION INTRAVENOUS at 17:08

## 2021-01-01 RX ADMIN — TAMSULOSIN HYDROCHLORIDE 0.4 MG: 0.4 CAPSULE ORAL at 09:47

## 2021-01-01 RX ADMIN — DOXYCYCLINE 100 MG: 100 INJECTION, POWDER, LYOPHILIZED, FOR SOLUTION INTRAVENOUS at 04:45

## 2021-01-01 RX ADMIN — AMLODIPINE BESYLATE 10 MG: 5 TABLET ORAL at 08:58

## 2021-01-01 RX ADMIN — FUROSEMIDE 40 MG: 40 TABLET ORAL at 10:15

## 2021-01-01 RX ADMIN — METHYLPREDNISOLONE SODIUM SUCCINATE 40 MG: 40 INJECTION, POWDER, FOR SOLUTION INTRAMUSCULAR; INTRAVENOUS at 17:08

## 2021-01-01 RX ADMIN — METHYLPREDNISOLONE SODIUM SUCCINATE 40 MG: 40 INJECTION, POWDER, FOR SOLUTION INTRAMUSCULAR; INTRAVENOUS at 00:14

## 2021-01-01 RX ADMIN — IPRATROPIUM BROMIDE AND ALBUTEROL SULFATE 3 ML: .5; 3 SOLUTION RESPIRATORY (INHALATION) at 07:25

## 2021-01-01 RX ADMIN — FUROSEMIDE 60 MG: 10 INJECTION, SOLUTION INTRAMUSCULAR; INTRAVENOUS at 21:24

## 2021-01-01 RX ADMIN — SOTALOL HYDROCHLORIDE 40 MG: 80 TABLET ORAL at 21:16

## 2021-01-01 RX ADMIN — IPRATROPIUM BROMIDE AND ALBUTEROL SULFATE 3 ML: .5; 3 SOLUTION RESPIRATORY (INHALATION) at 11:30

## 2021-01-01 RX ADMIN — ALBUTEROL SULFATE 2.5 MG: 2.5 SOLUTION RESPIRATORY (INHALATION) at 05:07

## 2021-01-01 RX ADMIN — CEFTRIAXONE 1 G: 1 INJECTION, POWDER, FOR SOLUTION INTRAMUSCULAR; INTRAVENOUS at 19:26

## 2021-01-01 RX ADMIN — DOXYCYCLINE 100 MG: 100 INJECTION, POWDER, LYOPHILIZED, FOR SOLUTION INTRAVENOUS at 15:38

## 2021-01-01 RX ADMIN — GUAIFENESIN 1200 MG: 600 TABLET ORAL at 08:58

## 2021-01-01 RX ADMIN — METHYLPREDNISOLONE SODIUM SUCCINATE 40 MG: 40 INJECTION, POWDER, FOR SOLUTION INTRAMUSCULAR; INTRAVENOUS at 10:16

## 2021-01-01 RX ADMIN — MAGNESIUM SULFATE HEPTAHYDRATE 2 G: 40 INJECTION, SOLUTION INTRAVENOUS at 13:10

## 2021-01-01 RX ADMIN — IPRATROPIUM BROMIDE AND ALBUTEROL SULFATE 3 ML: .5; 3 SOLUTION RESPIRATORY (INHALATION) at 12:27

## 2021-01-01 RX ADMIN — PREDNISONE 40 MG: 20 TABLET ORAL at 08:58

## 2021-01-01 RX ADMIN — TAMSULOSIN HYDROCHLORIDE 0.4 MG: 0.4 CAPSULE ORAL at 10:15

## 2021-01-01 RX ADMIN — CYANOCOBALAMIN TAB 1000 MCG 1000 MCG: 1000 TAB at 09:47

## 2021-01-01 RX ADMIN — HYDRALAZINE HYDROCHLORIDE 10 MG: 20 INJECTION, SOLUTION INTRAMUSCULAR; INTRAVENOUS at 05:21

## 2021-01-01 RX ADMIN — CYANOCOBALAMIN TAB 1000 MCG 1000 MCG: 1000 TAB at 10:15

## 2021-01-01 RX ADMIN — MIRTAZAPINE 30 MG: 15 TABLET, FILM COATED ORAL at 21:16

## 2021-01-01 RX ADMIN — ATORVASTATIN CALCIUM 40 MG: 40 TABLET, FILM COATED ORAL at 21:24

## 2021-01-01 RX ADMIN — METHYLPREDNISOLONE SODIUM SUCCINATE 40 MG: 40 INJECTION, POWDER, FOR SOLUTION INTRAMUSCULAR; INTRAVENOUS at 00:07

## 2021-01-01 RX ADMIN — FUROSEMIDE 60 MG: 10 INJECTION, SOLUTION INTRAMUSCULAR; INTRAVENOUS at 13:53

## 2021-01-01 RX ADMIN — CEFTRIAXONE 1 G: 1 INJECTION, POWDER, FOR SOLUTION INTRAMUSCULAR; INTRAVENOUS at 16:24

## 2021-01-01 RX ADMIN — CYANOCOBALAMIN TAB 1000 MCG 1000 MCG: 1000 TAB at 08:58

## 2021-01-01 RX ADMIN — IPRATROPIUM BROMIDE AND ALBUTEROL SULFATE 3 ML: .5; 3 SOLUTION RESPIRATORY (INHALATION) at 13:02

## 2021-01-01 RX ADMIN — GUAIFENESIN 1200 MG: 600 TABLET ORAL at 09:48

## 2021-01-01 RX ADMIN — RIVAROXABAN 15 MG: 15 TABLET, FILM COATED ORAL at 18:25

## 2021-01-01 RX ADMIN — GUAIFENESIN 1200 MG: 600 TABLET ORAL at 21:24

## 2021-01-01 RX ADMIN — RIVAROXABAN 15 MG: 15 TABLET, FILM COATED ORAL at 19:11

## 2021-01-01 RX ADMIN — METHYLPREDNISOLONE SODIUM SUCCINATE 40 MG: 40 INJECTION, POWDER, FOR SOLUTION INTRAMUSCULAR; INTRAVENOUS at 18:26

## 2021-01-01 RX ADMIN — ASPIRIN 81 MG: 81 TABLET, DELAYED RELEASE ORAL at 09:48

## 2021-01-01 RX ADMIN — TAMSULOSIN HYDROCHLORIDE 0.4 MG: 0.4 CAPSULE ORAL at 08:58

## 2021-01-01 ASSESSMENT — CONF VISUAL FIELD
METHOD: COUNTING FINGERS
OD_SUPERIOR_TEMPORAL_RESTRICTION: 1
OS_NORMAL: 1
OD_INFERIOR_NASAL_RESTRICTION: 1
OD_INFERIOR_TEMPORAL_RESTRICTION: 1
OD_SUPERIOR_NASAL_RESTRICTION: 1

## 2021-01-01 ASSESSMENT — ACTIVITIES OF DAILY LIVING (ADL)
CURRENT_FUNCTION: NO ASSISTANCE NEEDED
TOILETING_ISSUES: NO
WALKING_OR_CLIMBING_STAIRS_DIFFICULTY: NO
FALL_HISTORY_WITHIN_LAST_SIX_MONTHS: YES
DOING_ERRANDS_INDEPENDENTLY_DIFFICULTY: NO
PATIENT_/_FAMILY_COMMUNICATION_STYLE: SPOKEN LANGUAGE (ENGLISH OR BILINGUAL)
VISION_MANAGEMENT: GLASSES
DIFFICULTY_EATING/SWALLOWING: NO
WEAR_GLASSES_OR_BLIND: YES
CONCENTRATING,_REMEMBERING_OR_MAKING_DECISIONS_DIFFICULTY: NO
WHICH_OF_THE_ABOVE_FUNCTIONAL_RISKS_HAD_A_RECENT_ONSET_OR_CHANGE?: FALL HISTORY
HEARING_DIFFICULTY_OR_DEAF: NO
DIFFICULTY_COMMUNICATING: NO
DRESSING/BATHING_DIFFICULTY: NO
NUMBER_OF_TIMES_PATIENT_HAS_FALLEN_WITHIN_LAST_SIX_MONTHS: 2

## 2021-01-01 ASSESSMENT — MIFFLIN-ST. JEOR
SCORE: 1325
SCORE: 1311.39
SCORE: 1311.39
SCORE: 1306.86
SCORE: 1293.25
SCORE: 1311.39
SCORE: 1314.57
SCORE: 1293.25
SCORE: 1292.79
SCORE: 1309.58
SCORE: 1280.55

## 2021-01-01 ASSESSMENT — VISUAL ACUITY
METHOD: SNELLEN - LINEAR
OS_CC: 20/40
OD_CC: PROS
CORRECTION_TYPE: GLASSES

## 2021-01-01 ASSESSMENT — TONOMETRY
OS_IOP_MMHG: 11
OD_IOP_MMHG: XX
IOP_METHOD: ICARE

## 2021-01-01 ASSESSMENT — SLIT LAMP EXAM - LIDS: COMMENTS: NORMAL

## 2021-01-01 ASSESSMENT — PATIENT HEALTH QUESTIONNAIRE - PHQ9
SUM OF ALL RESPONSES TO PHQ QUESTIONS 1-9: 2
SUM OF ALL RESPONSES TO PHQ QUESTIONS 1-9: 2
SUM OF ALL RESPONSES TO PHQ QUESTIONS 1-9: 0
SUM OF ALL RESPONSES TO PHQ QUESTIONS 1-9: 6

## 2021-01-01 ASSESSMENT — EXTERNAL EXAM - LEFT EYE: OS_EXAM: NORMAL

## 2021-01-01 ASSESSMENT — EXTERNAL EXAM - RIGHT EYE: OD_EXAM: NORMAL

## 2021-05-28 NOTE — PROGRESS NOTES
[facility] clinic Follow Up Note    Richie Gordillo   78 y.o. male    Date of Visit: 5/15/2019    Chief Complaint   Patient presents with     Follow-up     3 month - Not fasting     Hypertension     Vascular Disease     Subjective  Richie is here for follow-up of multiple medical issues.  His main issue is hypertension with vascular disease and continued smoking.    Hypertension has been well controlled and denies orthostasis or edema.  He has not checked his blood pressure on his own but does have a cuff at home.    He is on a stable dose of amlodipine 5 mg a day, losartan 100 mg a day and Toprol- mg a day.  No orthostasis.  Blood pressure in February was 140/60.    He does have mild chronic renal insufficiency consistent with nephrosclerosis.  February 2019 labs reviewed by me today and creatinine 1.38.  That was stable.    No epigastric pain or bleeding on low-dose aspirin.  Does not take other NSAIDs.    He does have severe vascular disease.  Right iliac stent in 2009 and to the claudication.    2015 MRI MRA of the head and neck was reviewed by me today.  Carotid artery stenosis 50 to 69%.  History of small sella Gerson chronic infarct.  No severe stenosis intracranial, just mild plaque.    No new neurologic events.    He does have a diagnosis of sleep apnea but does not tolerate CPAP and does not use.  Denies significant daytime sleepiness.    No palpitations or history of arrhythmia.    He does have a heart murmur.  I did review the CHARLIE from 2015 that showed moderate plaque of the aortic arch but no heart valve problems.  Ejection fraction 65%.    No chest pain.  No history of MI or previous stress testing.    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  No new vision changes.    He continues to smoke half pack a day and not ready to quit.  No change in cough.  I did review the CT scan of the chest with patient from February 2019 which was negative.  He does not use the Spiriva  every day.  No worsening shortness of breath.    Dyspnea on exertion is a limiting factor for him.    He can walk a couple of blocks.    He does not have claudication.  No foot sores or rest pain.    Patient has been on simvastatin 40 mg a day.  He is been tolerating that well.  He has not used other statins.    May 2018 LDL 63 and HDL 31.    Past history of TURP for BPH.  He is urinating well now with Flomax.  PSA was 0.6 in February.  Occasional urinary frequency up to 3 times a night but mostly 1-2 times.    Bowel movements are normal.  No blood in stool.  No recurrence of the perirectal abscess issue which was resected in 2017.    October 2018 colonoscopy with a sessile serrated adenoma and poor bowel prep and therefore given a 1 year follow-up.    He is been living closer to his grandchildren.    He does not walk on a regular basis.  He tends to sit and read for long periods of time.  He does have a [employer] membership but does not often go.  He walks in the neighborhood now.    PMHx:    Past Medical History:   Diagnosis Date     Arthritis      BPH (benign prostatic hyperplasia)      Central retinal artery occlusion, right eye      Chronic kidney disease     CKD     Essential tremor      History of alcohol abuse     in remission     HTN (hypertension)      Hyperlipidemia      Hyponatremia      Leg mass, left      Murmur      PVD (peripheral vascular disease) (H)      Renal Tubular Acidosis      secondary to bladder outlet obstruction; resolved       Sleep apnea      Stroke (H)     TIA     PSHx:    Past Surgical History:   Procedure Laterality Date     CATARACT EXTRACTION       EXTERNAL EAR SURGERY      mastoid     EYE SURGERY       ILIAC ARTERY STENT Right     twice, 2009 and 2010     LEG SKIN LESION  BIOPSY / EXCISION Left 7/27/2017    Procedure: EXCISION LEFT POSTERIOR LEG MASS;  Surgeon: Joseluis Fernandez MD;  Location: [facility] OR;  Service:      KS TRANSURETHRAL ELEC-SURG  PROSTATECTOM      Description: Transurethral Resection Of Prostate (TURP);  Recorded: 04/27/2009;     Immunizations:   Immunization History   Administered Date(s) Administered     DT (pediatric) 09/21/2005     Influenza high dose, seasonal 02/13/2019     Influenza, inj, historic,unspecified 11/14/2011     Influenza, seasonal,quad inj 6-35 mos 01/14/2013     Pneumo Conj 13-V (2010&after) 04/06/2017     Pneumo Polysac 23-V 03/03/2009     Td,adult,historic,unspecified 09/21/2005     Tdap 09/28/2013     ZOSTER, LIVE 12/08/2011       ROS A comprehensive review of systems was performed and was otherwise negative    Medications, allergies, and problem list were reviewed and updated    Exam  /58   Pulse 60   Wt 150 lb (68 kg)   SpO2 98%   BMI 22.15 kg/m    Pupils and irises equal and reactive and no jaundice.  No leukoplakia or other oral lesions.  Teeth in good condition.  No cervical or supra clavicular adenopathy.  Lungs clear to auscultation just slight decreased breath sounds.  No wheezing or crackles.  Heart is regular with 2 out of 6 to 3 out of 6 systolic murmur right upper sternal border.  Abdomen is nontender nonobese with 1 cm liver.  No ankle edema.  Able to clamp on the exam table, normal neurologic exam.    Assessment/Plan  1. Essential hypertension  Blood pressure well controlled continue current medications    2. Peripheral vascular disease (H)  No claudication.    Goal for more aggressive statin treatment with his vascular disease.    Change simvastatin 40 mg over to atorvastatin 40 mg.    I did discuss toxicity risk with patient.  I discussed rhabdomyolysis and muscle achiness risk.  I also discussed hepatitis risk and other risks.  If he has any worsening muscle aches or abdominal pain or new symptoms, to contact clinic for reevaluation, otherwise follow-up fasting in 3 months.    Continue aspirin 81 mg    I stressed the importance of daily walking routine.  He will walk in the neighborhood, can  return to the [facility] in the winter.  - atorvastatin (LIPITOR) 40 MG tablet; Take 1 tablet (40 mg total) by mouth daily.  Dispense: 90 tablet; Refill: 2    3. History of stroke  Small cerebellar infarct.    He does have sleep apnea but does not use CPAP.  Nonobese.    Continue aspirin and statin as above.    4. Pulmonary emphysema, unspecified emphysema type (H)  Mild.  Stable.  Patient is not yet ready to quit smoking.    Spiriva, can increase back to daily if needed.    5. History of tobacco use  1 year follow-up CT scan of the chest for screening in February    6. Benign prostatic hyperplasia, unspecified whether lower urinary tract symptoms present  Urinary frequency controlled.  Flomax.  Status post TURP.    7. Stenosis of carotid artery, unspecified laterality  Patient wants to wait on his carotid ultrasound until this fall.  - US Carotid Bilateral; Future    Systolic heart murmur.  Patient wants to wait on his cardiac echo until this fall.    8. Medication monitoring encounter    - Comprehensive Metabolic Panel    Return in about 3 months (around 8/15/2019) for Recheck.   Patient Instructions   Change simvastatin over to atorvastatin 40 mg a day, atorvastatin is a more potent statin.    If you have significant worsening muscle aches or abdominal pain, contact clinic.    Follow-up fasting in 3 months to see me, with fasting labs.    Consider carotid ultrasound in the future to reevaluate your carotid artery stenosis.    Consider heart echo to further evaluate your heart murmur.    Work on your plan to quit smoking.  Set a quit date for smoking cessation.    Walk for 10 to 20 minutes at least 3-4 times a week.    Justo Tom MD      Current Outpatient Medications   Medication Sig Dispense Refill     amLODIPine (NORVASC) 5 MG tablet TAKE 1 TABLET DAILY 90 tablet 3     aspirin 81 MG EC tablet Take 1 tablet (81 mg total) by mouth daily. 90 tablet 3     b complex vitamins tablet Take 2 tablets by mouth daily. 180  tablet 12     cholecalciferol, vitamin D3, 400 unit Tab Take 400 Units by mouth daily.       clopidogrel (PLAVIX) 75 mg tablet Take 1 tablet (75 mg total) by mouth daily. 90 tablet 3     cyanocobalamin (VITAMIN B-12) 500 MCG tablet Take 500 mcg by mouth daily.       ferrous gluconate 256 mg (28 mg iron) Tab Take 1 tablet by mouth daily.       guaiFENesin ER (MUCINEX) 600 mg 12 hr tablet Take 1,200 mg by mouth 2 (two) times a day as needed (Using 1-2 times per week as needed.).              losartan (COZAAR) 100 MG tablet Take 1 tablet (100 mg total) by mouth daily. 90 tablet 5     mirtazapine (REMERON) 30 MG tablet Take 1 tablet (30 mg total) by mouth at bedtime. 90 tablet 3     multivitamin therapeutic (THERAGRAN) tablet Take 1 tablet by mouth daily.       omega-3 fatty acids-vitamin E (FISH OIL) 1,000 mg cap Take 1 capsule by mouth daily.       tamsulosin (FLOMAX) 0.4 mg cap Take 1 capsule (0.4 mg total) by mouth daily. 90 capsule 3     tiotropium (SPIRIVA WITH HANDIHALER) 18 mcg inhalation capsule Place 1 capsule (2 puffs total) into inhaler and inhale daily. Close and press button to crush and then inhale in 2 breaths. (Patient taking differently: Place 18 mcg into inhaler and inhale daily as needed (Using 1-2 times per week as needed.). Close and press button to crush and then inhale in 2 breaths.      ) 90 capsule prn     TOPROL  mg 24 hr tablet TAKE 1 TABLET DAILY. 90 tablet 3     vit B comp no.3-folic-C-biotin (B COMPLEX-VITAMIN C-FOLIC ACID) 1- mg-mg-mcg Tab tablet Take 2 tablets by mouth daily. (Patient taking differently: Take 1 tablet by mouth daily.       ) 180 tablet 12     atorvastatin (LIPITOR) 40 MG tablet Take 1 tablet (40 mg total) by mouth daily. 90 tablet 2     ipratropium (ATROVENT) 0.03 % nasal spray 2 sprays into each nostril 3 (three) times a day as needed for rhinitis. (Patient not taking: Reported on 2/13/2019      ) 30 mL prn     No current facility-administered medications  for this visit.      Allergies   Allergen Reactions     Quinolones Itching     Social History     Tobacco Use     Smoking status: Current Every Day Smoker     Packs/day: 0.50     Types: Cigarettes     Start date: 2/16/1962     Smokeless tobacco: Never Used   Substance Use Topics     Alcohol use: No     Comment: hx alcohol abuse in remission     Drug use: No

## 2021-05-28 NOTE — TELEPHONE ENCOUNTER
Contact patient.  Let him know his sodium level was lower than usual.  His creatinine was stable.    Lower sodium likely associated with his mild renal insufficiency, and may have been drinking too much water on the day he came to clinic.    Have him recheck his sodium level/basic metabolic panel/in 1 to 2 weeks.  Lab only visit and not fasting.    If sodium level is okay at recheck, there will be no change in treatment plan.

## 2021-05-28 NOTE — TELEPHONE ENCOUNTER
Patient notified of clinician's message and verbalized understanding. No further questions at this time. Helped him schedule lab appointment for 5/28/19.  Citlaly Pa CMA ............... 8:30 AM, 05/16/19

## 2021-05-28 NOTE — PATIENT INSTRUCTIONS - HE
Change simvastatin over to atorvastatin 40 mg a day, atorvastatin is a more potent statin.    If you have significant worsening muscle aches or abdominal pain, contact clinic.    Follow-up fasting in 3 months to see me, with fasting labs.    Consider carotid ultrasound in the future to reevaluate your carotid artery stenosis.    Consider heart echo to further evaluate your heart murmur.    Work on your plan to quit smoking.  Set a quit date for smoking cessation.    Walk for 10 to 20 minutes at least 3-4 times a week.

## 2021-05-28 NOTE — TELEPHONE ENCOUNTER
Refills Approved x 2     Rx renewed per Medication Renewal Policy. Medication was last renewed on  Clopidogrel = 6/21/2018 with 3 refills  Losartan = 4/24/2018 with 5 refills.  Last office visit: 5/15/2019 with PCP Dr MAIKEL Tom.    Delphine Castro, [facility] Triage/Med Refill 5/19/2019     Requested Prescriptions   Pending Prescriptions Disp Refills     clopidogrel (PLAVIX) 75 mg tablet 90 tablet 3     Sig: Take 1 tablet (75 mg total) by mouth daily.       Clopidogrel/Prasugrel/Ticagrelor Refill Protocol Passed - 5/19/2019  8:50 PM        Passed - PCP or prescribing provider visit in past 6 months       Last office visit with prescriber/PCP: 5/15/2019 OR same dept: 5/15/2019 Justo Tom MD OR same specialty: 5/15/2019 Justo Tom MD Last physical: Visit date not found Last MTM visit: Visit date not found     Next appt within 3 mo: Visit date not found  Next physical within 3 mo: Visit date not found  Prescriber OR PCP: Justo Tom MD  Last diagnosis associated with med order: 1. Hypertension  - clopidogrel (PLAVIX) 75 mg tablet; Take 1 tablet (75 mg total) by mouth daily.  Dispense: 90 tablet; Refill: 3    If protocol passes may refill for 6 months if within 3 months of last provider visit (or a total of 9 months).              Passed - Hemoglobin in past 12 months     Hemoglobin   Date Value Ref Range Status   02/13/2019 13.6 (L) 14.0 - 18.0 g/dL Final             losartan (COZAAR) 100 MG tablet 90 tablet 3     Sig: Take 1 tablet (100 mg total) by mouth daily.       Angiotensin Receptor Blocker Protocol Passed - 5/19/2019  8:50 PM        Passed - PCP or prescribing provider visit in past 12 months       Last office visit with prescriber/PCP: 5/15/2019 Justo Tom MD OR same dept: 5/15/2019 Justo Tom MD OR same specialty: 5/15/2019 Justo Tom MD  Last physical: Visit date not found Last MTM visit: Visit date not found   Next visit within 3 mo: Visit date not found  Next physical within  3 mo: Visit date not found  Prescriber OR PCP: Justo Tom MD  Last diagnosis associated with med order: 1. Hypertension  - clopidogrel (PLAVIX) 75 mg tablet; Take 1 tablet (75 mg total) by mouth daily.  Dispense: 90 tablet; Refill: 3    If protocol passes may refill for 12 months if within 3 months of last provider visit (or a total of 15 months).             Passed - Serum potassium within the past 12 months     Lab Results   Component Value Date    Potassium 5.1 (H) 05/15/2019             Passed - Blood pressure filed in past 12 months     BP Readings from Last 1 Encounters:   05/15/19 130/58             Passed - Serum creatinine within the past 12 months     Creatinine   Date Value Ref Range Status   05/15/2019 1.39 (H) 0.70 - 1.30 mg/dL Final

## 2021-05-31 NOTE — TELEPHONE ENCOUNTER
Contact patient.    Tell him that his potassium level is better, but his sodium level is even lower.    Also continued mild elevation of creatinine above baseline.    Have him reduce losartan further, down to 25 mg a day.  I did send a new prescription to his local [facility] or 25 mg tablets.    Also, tell patient to reduce the amount of coffee that he is drinking.  Avoid excess water drinking well.    Follow-up with me on September 26 as planned.

## 2021-05-31 NOTE — TELEPHONE ENCOUNTER
Patient notified of clinician's message and verbalized understanding. No further questions at this time. Patient did take morning dose already. He will take 1/2   pill going forward.  Citlaly Pa CMA ............... 8:21 AM, 08/08/19

## 2021-05-31 NOTE — PATIENT INSTRUCTIONS - HE
Check your blood pressure.  Bring your blood pressure cuff with you to next visit, with list of blood pressures.    If your blood pressure is running less than 110/60 or lightheaded dizziness, contact me to consider reduction in your medication.    Goal blood pressure 130-140/70-80.    Schedule carotid ultrasound and heart echo.    Follow-up in 2 to 3 weeks for blood pressure reevaluation and discussion of upcoming eye surgery.    Your colonoscopy will be due October 2019.    Continue to work toward setting a quit date for smoking.

## 2021-05-31 NOTE — PROGRESS NOTES
[facility] clinic Follow Up Note    Richie Gordillo   78 y.o. male    Date of Visit: 8/20/2019    Chief Complaint   Patient presents with     Follow-up     2 week blood pressure recheck     Subjective  Richie is here to follow-up on hypertension after significant renal lab changes were seen on August 7.  His blood pressure was low on that day at 110/60.    His creatinine was elevated at 1.47 with potassium of 5.7 and sodium of 129.    He does have a chronic low sodium condition.  He does tend to drink a lot of coffee.    I had him reduce the losartan from 100 mg a day, down to 50 mg a day which he did.    He still on losartan 5 mg a day and Toprol- mg a day.    He does check blood pressure at home with blood pressures running in the 140s to 160s over 60s to 80s, but he did bring in his blood pressure cuff today and it does appear its running 10 points higher than our cuff.    No increasing shortness of breath or edema.    He does have sleep apnea but does not tolerate CPAP.    He has COPD with 2 block dyspnea on exertion but refuses to quit smoking, still half pack a day.  No change in cough.  No acute worsening shortness of breath.    February 2019 CT scan of the chest was negative.    Past history of severe vascular disease.  Right iliac stent in 2009.  Carotid artery stenosis was stable on carotid ultrasound earlier this month at 50-69%.  History of right central retinal artery occlusion.    He is on aspirin 81 mg and Plavix.    He was changed this may from simvastatin over to atorvastatin 40 mg.    I did review lab work from earlier this month with an LDL of 46 and HDL 33 with normal liver test.    Patient had a repeat cardiac echo earlier this month which I reviewed with patient.  He now has progression to moderate aortic stenosis.  No pulmonary hypertension and ejection fraction 57%.    No chest pain or chest pressure.  He has not had a cardiac event or cardiac intervention.    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    BPH stable on Flomax.    Past history of multiple polyps of the one year colonoscopy due October of this year.    History of perirectal abscess resected 2017, no complaints today.    PMHx:    Past Medical History:   Diagnosis Date     Arthritis      BPH (benign prostatic hyperplasia)      Central retinal artery occlusion, right eye      Chronic kidney disease     CKD     Essential tremor      History of alcohol abuse     in remission     HTN (hypertension)      Hyperlipidemia      Hyponatremia      Leg mass, left      Murmur      PVD (peripheral vascular disease) (H)      Renal Tubular Acidosis      secondary to bladder outlet obstruction; resolved       Sleep apnea      Stroke (H)     TIA     PSHx:    Past Surgical History:   Procedure Laterality Date     CATARACT EXTRACTION       EXTERNAL EAR SURGERY      mastoid     EYE SURGERY       ILIAC ARTERY STENT Right     twice, 2009 and 2010     LEG SKIN LESION  BIOPSY / EXCISION Left 7/27/2017    Procedure: EXCISION LEFT POSTERIOR LEG MASS;  Surgeon: Joseluis Fernandez MD;  Location: [facility];  Service:      NH TRANSURETHRAL ELEC-SURG PROSTATECTOM      Description: Transurethral Resection Of Prostate (TURP);  Recorded: 04/27/2009;     Immunizations:   Immunization History   Administered Date(s) Administered     DT (pediatric) 09/21/2005     Influenza high dose, seasonal 02/13/2019     Influenza, inj, historic,unspecified 11/14/2011     Influenza, seasonal,quad inj 6-35 mos 01/14/2013     Pneumo Conj 13-V (2010&after) 04/06/2017     Pneumo Polysac 23-V 03/03/2009     Td,adult,historic,unspecified 09/21/2005     Tdap 09/28/2013     ZOSTER, LIVE 12/08/2011       ROS A comprehensive review of systems was performed and was otherwise negative    Medications, allergies, and problem list were reviewed and updated    Exam  /78 (Patient Site: Right Arm,  Patient Position: Sitting, Cuff Size: Adult Regular)   Pulse (!) 56   Wt 148 lb (67.1 kg)   BMI 21.86 kg/m    Alert and oriented x3.  No jaundice.  Lungs are clear to auscultation with just slight reduced breath sounds throughout.  No wheezing.  Heart is regular with 3/6 murmur.  No edema.  Abdomen nontender.    Assessment/Plan  1. Essential hypertension  Blood pressure adequately controlled now.    Continue on current medications at this time.  Rechecking potassium and sodium and creatinine.    If continued significant abnormality, may need further adjustment of medication as discussed below.    Has sleep apnea but does not tolerate CPAP    2. Chronic kidney disease, unspecified CKD stage  As above  - Basic Metabolic Panel    3. Pulmonary emphysema, unspecified emphysema type (H)  Stable.  Refuses to quit smoking.  Continue current inhalers.    Yearly chest CT scan for screening in February    4. Aortic stenosis, moderate  Recheck echo in 1 year.  Asymptomatic    5. History of stroke  History of small cerebellar stroke.  MRI MRA did not show severe intracranial stenosis.    Continue medical management of carotid artery stenosis.    6. Carotid artery stenosis, asymptomatic, bilateral  As above.    For his upcoming eye surgery, I would like to keep him on aspirin 81 mg a day, but I did discuss some bleeding risk with that.    I would plan to stop the Plavix for 1 to 2 weeks before surgery, depending on surgeon preference.    Patient will follow-up for preop assessment 1 to 2 weeks before that eye surgery.    7. History of colonic polyps  October 2018 colonoscopy with large serrated serrated adenoma poor prep, 1 year follow-up    8. Encounter for therapeutic drug monitoring    - Basic Metabolic Panel    9. Hypertension  If continued hyperkalemia and elevated creatinine, may need to discontinue losartan or reduce to 25 mg a day.    Continue amlodipine 5 mg a day and Toprol-XL at current dose.    Follow-up in 1 month  to reevaluate blood pressure.    Could consider diuretic and adjustment in amlodipine if edema occurs.  - losartan (COZAAR) 100 MG tablet; Take 0.5 tablets (50 mg total) by mouth daily.  Dispense: 90 tablet; Refill: 3    BPH controlled on Flomax.    Return in about 4 weeks (around 9/17/2019) for Recheck.   Patient Instructions   Continue on current medications at this time.  Continue on the lower dose of losartan, unless you were contacted to stop it entirely.    Rechecking potassium and kidney labs today.    See me in 1 month for a another blood pressure reevaluation.    Plan to see me for preop 2 weeks before your surgery in mid October.    Ask your surgeon if they are willing to do the eye surgery while you are taking aspirin 81 mg a day.  I would plan to have you stop the Plavix for 1 to 2 weeks before the surgery.    Your plan to have your colonoscopy in October.    Work on a plan to quit smoking.    Recheck heart echo in 1 year to reevaluate aortic stenosis.  Plan to recheck carotid ultrasound in 1 to 2 years.        Justo Tom MD      Current Outpatient Medications   Medication Sig Dispense Refill     amLODIPine (NORVASC) 5 MG tablet TAKE 1 TABLET DAILY 90 tablet 3     aspirin 81 MG EC tablet Take 1 tablet (81 mg total) by mouth daily. 90 tablet 3     atorvastatin (LIPITOR) 40 MG tablet Take 1 tablet (40 mg total) by mouth daily. 90 tablet 2     b complex vitamins tablet Take 2 tablets by mouth daily. 180 tablet 12     cholecalciferol, vitamin D3, 400 unit Tab Take 400 Units by mouth daily.       clopidogrel (PLAVIX) 75 mg tablet Take 1 tablet (75 mg total) by mouth daily. 90 tablet 1     cyanocobalamin (VITAMIN B-12) 500 MCG tablet Take 500 mcg by mouth daily.       ferrous gluconate 256 mg (28 mg iron) Tab Take 1 tablet by mouth daily.       losartan (COZAAR) 100 MG tablet Take 0.5 tablets (50 mg total) by mouth daily. 90 tablet 3     mirtazapine (REMERON) 30 MG tablet Take 1 tablet (30 mg total) by  mouth at bedtime. 90 tablet 3     multivitamin therapeutic (THERAGRAN) tablet Take 1 tablet by mouth daily.       neomycin-polymyxin-dexamethasone (MAXITROL) 3.5mg/mL-10,000 unit/mL-0.1 % ophthalmic suspension Administer 1 drop to the right eye as needed.       omega-3 fatty acids-vitamin E (FISH OIL) 1,000 mg cap Take 1 capsule by mouth daily.       tamsulosin (FLOMAX) 0.4 mg cap Take 1 capsule (0.4 mg total) by mouth daily. 90 capsule 3     tiotropium (SPIRIVA WITH HANDIHALER) 18 mcg inhalation capsule Place 1 capsule (2 puffs total) into inhaler and inhale daily. Close and press button to crush and then inhale in 2 breaths. 90 capsule 5     TOPROL  mg 24 hr tablet TAKE 1 TABLET DAILY. 90 tablet 3     vit B comp no.3-folic-C-biotin (B COMPLEX-VITAMIN C-FOLIC ACID) 1- mg-mg-mcg Tab tablet Take 2 tablets by mouth daily. (Patient taking differently: Take 1 tablet by mouth daily.       ) 180 tablet 12     vitamin E acetate (VITAMIN E ORAL) Take 1 tablet by mouth daily.       guaiFENesin ER (MUCINEX) 600 mg 12 hr tablet Take 1,200 mg by mouth 2 (two) times a day as needed (Using 1-2 times per week as needed.).              ipratropium (ATROVENT) 0.03 % nasal spray 2 sprays into each nostril 3 (three) times a day as needed for rhinitis. (Patient not taking: Reported on 8/20/2019      ) 30 mL prn     No current facility-administered medications for this visit.      Allergies   Allergen Reactions     Ofloxacin Hives     Quinolones Itching     Social History     Tobacco Use     Smoking status: Current Every Day Smoker     Packs/day: 0.50     Types: Cigarettes     Start date: 2/16/1962     Smokeless tobacco: Never Used   Substance Use Topics     Alcohol use: No     Comment: hx alcohol abuse in remission     Drug use: No

## 2021-05-31 NOTE — PROGRESS NOTES
[facility] clinic Follow Up Note    Richie Gordillo   78 y.o. male    Date of Visit: 8/7/2019    Chief Complaint   Patient presents with     Follow-up     3 month checkup     Subjective  Richie is here for routine follow-up of hypertension and other medical issues.    In May of this year I had him change from simvastatin 40 mg over to atorvastatin 40 mg a day.  He tolerated that well.  Denies any worsening myalgias or fatigue.  No right upper quadrant pain or jaundice.    He does have severe vascular disease.  His right iliac stent in 2009.    I did review the MRI MRA from 2015 which showed 50-69% bilateral carotid artery stenosis, confirmed on ultrasound.  Past history of small sella Gerson stroke.  No severe intracranial stenosis.    In 2015 he did have a right central retinal artery occlusion.    He has not had a history of heart attack or chest pain or cardiac event.    I did review the heart echo from 2015 that showed ejection fraction 65%, questionable mild aortic stenosis.    No history of syncope.  No lower extremity edema.    He does have sleep apnea but does not tolerate CPAP.  No arrhythmia history.    He does have dyspnea on exertion at 2 blocks consistent with COPD with his continued smoking.    February 2019 chest CT scan for screening reviewed by me today and negative.    Still smoking half pack a day and not ready to quit smoking.    No hemoptysis or change in cough.    The patient has chronic right eye pain, previous injury with blindness in his right eye.  He is seeing someone at [facility] with plan for removal of the eye due to pain.    His blood pressure is low today.  He has not checked his blood pressure on his own.  He was doing some yard work, quite strenuous yard work today with significant sweating.  He drank coffee but not water.    He denies lightheaded dizzy spells.    There is been no bleeding issues.  No diarrhea.  Otherwise eating well.    Patient does have some  mild renal insufficiency with a creatinine of 1.38 in May.    He is on losartan 100 mg a day, amlodipine 5 mill grams a day and Toprol- mg a day.    In May of this year his blood pressure was 130/58.    Generally his blood pressures been in the 130s over 70s, but has not checked it recently.    Past history of TURP.  1-3 nocturia on Flomax.  PSA in February of this year was 0.6.    Past history of perirectal abscess resection 2017.    Sessile serrated adenoma and poor prep October 2018 colonoscopy with 1 year follow-up.  Bowels are normal currently.    PMHx:    Past Medical History:   Diagnosis Date     Arthritis      BPH (benign prostatic hyperplasia)      Central retinal artery occlusion, right eye      Chronic kidney disease     CKD     Essential tremor      History of alcohol abuse     in remission     HTN (hypertension)      Hyperlipidemia      Hyponatremia      Leg mass, left      Murmur      PVD (peripheral vascular disease) (H)      Renal Tubular Acidosis      secondary to bladder outlet obstruction; resolved       Sleep apnea      Stroke (H)     TIA     PSHx:    Past Surgical History:   Procedure Laterality Date     CATARACT EXTRACTION       EXTERNAL EAR SURGERY      mastoid     EYE SURGERY       ILIAC ARTERY STENT Right     twice, 2009 and 2010     LEG SKIN LESION  BIOPSY / EXCISION Left 7/27/2017    Procedure: EXCISION LEFT POSTERIOR LEG MASS;  Surgeon: Joseluis Fernandez MD;  Location: [facility];  Service:      SD TRANSURETHRAL ELEC-SURG PROSTATECTOM      Description: Transurethral Resection Of Prostate (TURP);  Recorded: 04/27/2009;     Immunizations:   Immunization History   Administered Date(s) Administered     DT (pediatric) 09/21/2005     Influenza high dose, seasonal 02/13/2019     Influenza, inj, historic,unspecified 11/14/2011     Influenza, seasonal,quad inj 6-35 mos 01/14/2013     Pneumo Conj 13-V (2010&after) 04/06/2017     Pneumo Polysac 23-V 03/03/2009      Td,adult,historic,unspecified 09/21/2005     Tdap 09/28/2013     ZOSTER, LIVE 12/08/2011       ROS A comprehensive review of systems was performed and was otherwise negative    Medications, allergies, and problem list were reviewed and updated    Exam  /60 (Patient Site: Right Arm, Patient Position: Sitting, Cuff Size: Adult Regular)   Pulse (!) 56   Wt 148 lb 14.4 oz (67.5 kg)   BMI 21.99 kg/m    He does have bilateral carotid bruits and a loud 3 out of 6 systolic murmur.  No gallop or rub.  Heart is regular.  No jaundice.  Lungs are clear without wheezing or crackles.  Slight decreased breath sounds.  Abdomen nontender with no hepatomegaly.  No ankle edema.  Nonfocal neurologic exam.  Right eye moderately opaque but no evidence of inflammation or purulent discharge.    Assessment/Plan  1. COPD (chronic obstructive pulmonary disease) (H)  Stable.  Still refuses to quit smoking.    Spiriva daily.    Repeat screening chest CT scan February of next year.  - tiotropium (SPIRIVA WITH HANDIHALER) 18 mcg inhalation capsule; Place 1 capsule (2 puffs total) into inhaler and inhale daily. Close and press button to crush and then inhale in 2 breaths.  Dispense: 90 capsule; Refill: 5    2. Pulmonary emphysema, unspecified emphysema type (H)  As above    3. History of tobacco use  As above    4. Peripheral vascular disease (H)  Severe, no claudication.  Symptomatic.    Recently increased his Lipitor to 40 mg a day in May.    Continue aspirin and Plavix.  Has an iliac stent.    Could consider further increase to 80 mg of Lipitor in the future.    5. Stenosis of carotid artery, unspecified laterality  Asymptomatic  - US Carotid Bilateral; Future    6. Essential hypertension  Blood pressure low today.  I suspect dehydration with his yard work and only drinking coffee.    See patient instructions below.  Follow-up in 2 weeks to reevaluate blood pressure.    If significant worsening creatinine, may need reduction in  losartan, otherwise could consider a slight reduction in amlodipine.    7. Chronic kidney disease, unspecified CKD stage  As above, check labs today    8. History of stroke  No new event    9. History of colonic polyps  1 year colonoscopy due in October.  Patient told to expect communication from [place] [facility]    10. Encounter for therapeutic drug monitoring    - Comprehensive Metabolic Panel  - HM2(CBC w/o Differential)  - CK Total    11. Hypercholesterolemia  See above.  Could consider increase of atorvastatin 80 mg a day in the future.  I did discuss toxicity risk including muscle and liver toxicity.  - Lipid Cascade    12. Aortic valve stenosis, etiology of cardiac valve disease unspecified  Evaluate for progression, especially with going to have surgery  - Echo Complete; Future    13. Blind right eye  Possible consideration of removal of the eye because of blindness and continued pain.  I would recommend a preop evaluation prior to that procedure.  He will need further work-up as above with echo and carotid ultrasound.    BPH controlled on Flomax    Sleep apnea, does not tolerate CPAP.  Denies worsening fatigue.        Return in about 13 days (around 8/20/2019) for Recheck.   Patient Instructions   Check your blood pressure.  Bring your blood pressure cuff with you to next visit, with list of blood pressures.    If your blood pressure is running less than 110/60 or lightheaded dizziness, contact me to consider reduction in your medication.    Goal blood pressure 130-140/70-80.    Schedule carotid ultrasound and heart echo.    Follow-up in 2 to 3 weeks for blood pressure reevaluation and discussion of upcoming eye surgery.    Your colonoscopy will be due October 2019.    Continue to work toward setting a quit date for smoking.    Justo Tom MD        Current Outpatient Medications   Medication Sig Dispense Refill     amLODIPine (NORVASC) 5 MG tablet TAKE 1 TABLET DAILY 90 tablet 3     aspirin 81  MG EC tablet Take 1 tablet (81 mg total) by mouth daily. 90 tablet 3     atorvastatin (LIPITOR) 40 MG tablet Take 1 tablet (40 mg total) by mouth daily. 90 tablet 2     b complex vitamins tablet Take 2 tablets by mouth daily. 180 tablet 12     cholecalciferol, vitamin D3, 400 unit Tab Take 400 Units by mouth daily.       clopidogrel (PLAVIX) 75 mg tablet Take 1 tablet (75 mg total) by mouth daily. 90 tablet 1     cyanocobalamin (VITAMIN B-12) 500 MCG tablet Take 500 mcg by mouth daily.       ferrous gluconate 256 mg (28 mg iron) Tab Take 1 tablet by mouth daily.       losartan (COZAAR) 100 MG tablet Take 1 tablet (100 mg total) by mouth daily. 90 tablet 3     mirtazapine (REMERON) 30 MG tablet Take 1 tablet (30 mg total) by mouth at bedtime. 90 tablet 3     multivitamin therapeutic (THERAGRAN) tablet Take 1 tablet by mouth daily.       neomycin-polymyxin-dexamethasone (MAXITROL) 3.5mg/mL-10,000 unit/mL-0.1 % ophthalmic suspension Administer 1 drop to the right eye as needed.       omega-3 fatty acids-vitamin E (FISH OIL) 1,000 mg cap Take 1 capsule by mouth daily.       tamsulosin (FLOMAX) 0.4 mg cap Take 1 capsule (0.4 mg total) by mouth daily. 90 capsule 3     TOPROL  mg 24 hr tablet TAKE 1 TABLET DAILY. 90 tablet 3     vit B comp no.3-folic-C-biotin (B COMPLEX-VITAMIN C-FOLIC ACID) 1- mg-mg-mcg Tab tablet Take 2 tablets by mouth daily. (Patient taking differently: Take 1 tablet by mouth daily.       ) 180 tablet 12     guaiFENesin ER (MUCINEX) 600 mg 12 hr tablet Take 1,200 mg by mouth 2 (two) times a day as needed (Using 1-2 times per week as needed.).              ipratropium (ATROVENT) 0.03 % nasal spray 2 sprays into each nostril 3 (three) times a day as needed for rhinitis. (Patient taking differently: 2 sprays into each nostril as needed for rhinitis.       ) 30 mL prn     tiotropium (SPIRIVA WITH HANDIHALER) 18 mcg inhalation capsule Place 1 capsule (2 puffs total) into inhaler and inhale  daily. Close and press button to crush and then inhale in 2 breaths. 90 capsule 5     No current facility-administered medications for this visit.      Allergies   Allergen Reactions     Quinolones Itching     Social History     Tobacco Use     Smoking status: Current Every Day Smoker     Packs/day: 0.50     Types: Cigarettes     Start date: 2/16/1962     Smokeless tobacco: Never Used   Substance Use Topics     Alcohol use: No     Comment: hx alcohol abuse in remission     Drug use: No

## 2021-05-31 NOTE — TELEPHONE ENCOUNTER
Contact patient.  He did have some elevation of his creatinine.  His sodium level was significantly low and potassium level too high.    This would be consistent with his low blood pressure and only drinking coffee in the morning of his visit.    He will need to reduce his losartan dose down to 50 mg a day by cutting pills in half.    Recheck in August 20 as planned.

## 2021-05-31 NOTE — PATIENT INSTRUCTIONS - HE
Continue on current medications at this time.  Continue on the lower dose of losartan, unless you were contacted to stop it entirely.    Rechecking potassium and kidney labs today.    See me in 1 month for a another blood pressure reevaluation.    Plan to see me for preop 2 weeks before your surgery in mid October.    Ask your surgeon if they are willing to do the eye surgery while you are taking aspirin 81 mg a day.  I would plan to have you stop the Plavix for 1 to 2 weeks before the surgery.    Your plan to have your colonoscopy in October.    Work on a plan to quit smoking.    Recheck heart echo in 1 year to reevaluate aortic stenosis.  Plan to recheck carotid ultrasound in 1 to 2 years.

## 2021-06-01 NOTE — PROGRESS NOTES
[facility] clinic Follow Up Note    Richie Gordillo   78 y.o. male    Date of Visit: 9/26/2019    Chief Complaint   Patient presents with     Hypertension     recheck      Subjective  Richie is here for follow-up on hypertension and rechecking of his electro lites and kidney labs.    Earlier this summer his creatinine was elevated at 1.47 and an elevated potassium of 5.7.  His blood pressure was on the low side at that time.  I had him reduce losartan down to 50 mg a day.    But on August 20 follow-up his sodium was lower at 128.  Potassium better at 4.9.  Creatinine still 1.45.  Blood pressure was 126/78 at that time.    I had him lower the losartan further down to 25 mg a day which she is taking now.  Also on amlodipine 5 mg a day and Toprol- mg a day.    He otherwise feels well.  Denies lightheaded dizzy spells.  He has not checked his blood pressure.    No headache or edema issues.  No increasing shortness of breath beyond baseline.    He does not eat regular breakfast, tends to drink a lot of coffee.  He did not have breakfast today and had 3 cups of coffee.    No tremor or seizure history.    He does have sleep apnea but does not tolerate CPAP.  He does have COPD, still smoking half pack a day.    February 2019 chest CT scan reviewed by me was negative.    We did review the heart echo from August 2019.  He does have progression of his aortic stenosis to moderate degree.  He had an ejection fraction of 57% but there is no pulmonary hypertension noted.    He has had a previous stroke.  Previous MRA did not show severe intracranial stenosis.    He does have carotid artery stenosis 50-69% bilaterally on imaging last month.  History of right iliac stent 2009.  No leg claudication.    On Lipitor 40 mg today.  No new generalized myalgias and liver tests were normal last month.    Still on aspirin and Plavix.  No bleeding issues or falls.    Past history of right retinal artery ischemic event.    History  of perirectal abscess without recurrence.    Urinating adequately with his BPH issue on Flomax.    He is planning on having his right eye removed on October 30, he is blind with chronic eye pain.    He had a number of flat sessile lesions that were unable to be removed on October 2018 colonoscopy that were tattooed.  He also had a sessile serrated adenoma.  His 1 year follow-up is due next month, but with patient's eye surgery wants to wait until later this fall.  Bowels are regular no blood in stool.    PMHx:    Past Medical History:   Diagnosis Date     Arthritis      BPH (benign prostatic hyperplasia)      Central retinal artery occlusion, right eye      Chronic kidney disease     CKD     Essential tremor      History of alcohol abuse     in remission     HTN (hypertension)      Hyperlipidemia      Hyponatremia      Leg mass, left      Murmur      PVD (peripheral vascular disease) (H)      Renal Tubular Acidosis      secondary to bladder outlet obstruction; resolved       Sleep apnea      Stroke (H)     TIA     PSHx:    Past Surgical History:   Procedure Laterality Date     CATARACT EXTRACTION       EXTERNAL EAR SURGERY      mastoid     EYE SURGERY       ILIAC ARTERY STENT Right     twice, 2009 and 2010     LEG SKIN LESION  BIOPSY / EXCISION Left 7/27/2017    Procedure: EXCISION LEFT POSTERIOR LEG MASS;  Surgeon: Joseluis Fernandez MD;  Location: [facility];  Service:      [place] TRANSURETHRAL ELEC-SURG PROSTATECTOM      Description: Transurethral Resection Of Prostate (TURP);  Recorded: 04/27/2009;     Immunizations:   Immunization History   Administered Date(s) Administered     DT (pediatric) 09/21/2005     Influenza high dose,seasonal,PF, 65+ yrs 02/13/2019, 09/26/2019     Influenza, inj, historic,unspecified 11/14/2011     Influenza, seasonal,quad inj 6-35 mos 01/14/2013     Pneumo Conj 13-V (2010&after) 04/06/2017     Pneumo Polysac 23-V 03/03/2009     Td,adult,historic,unspecified 09/21/2005     Tdap  09/28/2013     ZOSTER, LIVE 12/08/2011       ROS A comprehensive review of systems was performed and was otherwise negative    Medications, allergies, and problem list were reviewed and updated    Exam  /58   Pulse 64   Wt 149 lb 4.8 oz (67.7 kg)   SpO2 97%   BMI 22.05 kg/m    Lungs are clear except for decreased breath sounds throughout.  No wheezing or rhonchi.  Heart is regular with 3 out of 6 systolic murmur.  No ankle edema.    Assessment/Plan  1. Essential hypertension  Adequately controlled, just mildly high systolic.  Continue on the lower dose of losartan 25 mg a day.    High potassium and low sodium complicating picture.  I suspect polydipsia in the setting of renal insufficiency as a contributing factor.    Avoiding diuretic.    Continue amlodipine 5 mg and Toprol  mg a day.    Could consider higher dose of amlodipine if needed for further blood pressure control.    Could consider furosemide as a diuretic for further blood pressure control, if needed.    2. Chronic kidney disease, unspecified CKD stage  Creatinine at baseline level of 1.3-1.4    3. Carotid artery stenosis, asymptomatic, bilateral  Asymptomatic.    History of severe peripheral vascular disease, continued smoking.    History of iliac stent.    Plan to continue on aspirin 81 mg a day for his upcoming eye surgery.    Plan off Plavix for 10 days prior to surgery, restarting after.    4. Aortic stenosis, moderate  Asymptomatic.  Recheck echo in 1 year    5. Pulmonary emphysema, unspecified emphysema type (H)  Still refusing to quit smoking.  Moderate dyspnea on exertion.  No exacerbation at this time.    I counseled patient to cut down in preparation for his surgery.    Flu shot was given today    6. History of colonic polyps  He will wait to do his follow-up colonoscopy until later this year.  I did review the risk of developing colon cancer, if he waits too long.    GI has recommended off Plavix for 5 days prior to his  colonoscopy, plan to continue on aspirin.    7. Medication monitoring encounter    - Basic Metabolic Panel    Return in 20 days (on 10/16/2019) for preop.   Patient Instructions   Continue current medications, including the 25 mg dose of losartan.    However, if your labs are not improved, I may contact you for further medication changes.    Try to eat a good breakfast in the morning and avoid drinking large amounts of fluid or coffee, as that can lower your sodium.    Reduce smoking in preparation for your upcoming surgery.    I will have you stop the Plavix/clopidogrel for 10 days prior to your eye surgery.  I will plan to have you continue on the aspirin.    We will discuss your colonoscopy in November, after you recover from the eye surgery.    Flu shot today.    Justo Tom MD        Current Outpatient Medications   Medication Sig Dispense Refill     amLODIPine (NORVASC) 5 MG tablet TAKE 1 TABLET DAILY 90 tablet 3     aspirin 81 MG EC tablet Take 1 tablet (81 mg total) by mouth daily. 90 tablet 3     atorvastatin (LIPITOR) 40 MG tablet Take 1 tablet (40 mg total) by mouth daily. 90 tablet 2     b complex vitamins tablet Take 2 tablets by mouth daily. 180 tablet 12     cholecalciferol, vitamin D3, 400 unit Tab Take 400 Units by mouth daily.       clopidogrel (PLAVIX) 75 mg tablet Take 1 tablet (75 mg total) by mouth daily. 90 tablet 1     cyanocobalamin (VITAMIN B-12) 500 MCG tablet Take 500 mcg by mouth daily.       ferrous gluconate 256 mg (28 mg iron) Tab Take 1 tablet by mouth daily.       guaiFENesin ER (MUCINEX) 600 mg 12 hr tablet Take 1,200 mg by mouth 2 (two) times a day as needed (Using 1-2 times per week as needed.).              losartan (COZAAR) 25 MG tablet Take 1 tablet (25 mg total) by mouth daily. 30 tablet 4     mirtazapine (REMERON) 30 MG tablet Take 1 tablet (30 mg total) by mouth at bedtime. 90 tablet 3     multivitamin therapeutic (THERAGRAN) tablet Take 1 tablet by mouth daily.        neomycin-polymyxin-dexamethasone (MAXITROL) 3.5mg/mL-10,000 unit/mL-0.1 % ophthalmic suspension Administer 1 drop to the right eye as needed.       omega-3 fatty acids-vitamin E (FISH OIL) 1,000 mg cap Take 1 capsule by mouth daily.       tamsulosin (FLOMAX) 0.4 mg cap Take 1 capsule (0.4 mg total) by mouth daily. 90 capsule 3     tiotropium (SPIRIVA WITH HANDIHALER) 18 mcg inhalation capsule Place 1 capsule (2 puffs total) into inhaler and inhale daily. Close and press button to crush and then inhale in 2 breaths. 90 capsule 5     TOPROL  mg 24 hr tablet TAKE 1 TABLET DAILY. 90 tablet 3     vit B comp no.3-folic-C-biotin (B COMPLEX-VITAMIN C-FOLIC ACID) 1- mg-mg-mcg Tab tablet Take 2 tablets by mouth daily. (Patient taking differently: Take 1 tablet by mouth daily.       ) 180 tablet 12     vitamin E acetate (VITAMIN E ORAL) Take 1 tablet by mouth daily.       No current facility-administered medications for this visit.      Allergies   Allergen Reactions     Ofloxacin Hives     Quinolones Itching     Social History     Tobacco Use     Smoking status: Current Every Day Smoker     Packs/day: 0.50     Types: Cigarettes     Start date: 2/16/1962     Smokeless tobacco: Never Used   Substance Use Topics     Alcohol use: No     Comment: hx alcohol abuse in remission     Drug use: No

## 2021-06-02 NOTE — PATIENT INSTRUCTIONS - HE
Continue aspirin daily.  You do not need to take aspirin on the day of surgery, however.    Nothing to eat after midnight on the day of surgery.    Do not take clopidogrel/Plavix for 10 days prior to your surgery.  You can restart the clopidogrel/Plavix with your aspirin the day after surgery, if no major bleeding.    Do not take the losartan on the morning of surgery.    Do take your amlodipine and metoprolol as you normally would take them.  If these are normally taken in the morning, do take them with a sip of water the morning of surgery.    Follow-up with me in 4 to 6 weeks to reevaluate blood pressure.  I will plan to discuss having you proceed with the colonoscopy at that time.    Reduce or quit smoking.

## 2021-06-02 NOTE — PROGRESS NOTES
Preoperative Exam    Scheduled Procedure: Right eye evisceration  Surgery Date:  10/30/2019  Surgery Location: [facility]    Surgeon:  Dr. Moe Mims    Assessment/Plan:     1. Preoperative examination  Patient clinically stable to proceed with eye evisceration surgery on October 30 as planned.    See patient instructions for medication adjustments.    Postoperative pain management will be per surgery.  Can use Tylenol as needed for pain.  - Electrocardiogram Perform and Read    I did discuss some risks with surgery including infection, bleeding, stroke and heart attack, and other risks.  He accepts these risks and wishes to proceed as planned.    2. Blindness of right eye, unspecified left eye visual impairment category  Chronic right eye pain, right orbit nonfunctional.  Remove eye to reduce pain, prosthesis placement planned.    3. Essential hypertension  Blood pressure controlled.  Blood pressure was 140/60 my recheck today.  He denies orthostasis.    Patient has recently had a low sodium and high potassium with elevated creatinine, but that improved on a lower dose of losartan 25 mg a day.  I will be rechecking labs today.    He denies orthostasis.  He has not checked his blood pressure recently.    He will skip the losartan on the day of surgery.    If his blood pressure does trend lower, or worsening creatinine or hyperkalemia, he may need to discontinue the losartan entirely.  - Electrocardiogram Perform and Read    He will take the amlodipine and metoprolol as usual.  Take with sip of water on morning of surgery, if he usually takes that in the morning.  Patient currently forgot whether he takes those in the morning or evening.    4. Chronic kidney disease, unspecified CKD stage  Mild elevated creatinine, was improved on recent check on lower dose losartan.  Rechecking labs today.  Avoiding NSAIDs.    5. Carotid artery stenosis, asymptomatic, bilateral  I did review recent ultrasound August 2019.  50-69%  bilateral.  Asymptomatic.  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    I did review MRI from 2015 that did shows some small chronic cerebellar hemisphere infarcts, but no major cerebral infarct.  Some moderate chronic small vessel ischemic changes.  MRA showed some plaque but no high-grade stenosis.    Continue medical management with Lipitor 40 mg.  Aspirin and Plavix.    With his history of retinal ischemic event and carotid stenosis, I will have patient stay on aspirin 81 mg a day.  I did discuss bleeding risk with that, and he accepts the risk and will stay on aspirin, except for day of surgery, can skip aspirin.    I will have him stop the Plavix for 10 days prior to the surgery, restart day after if no major bleeding.    6. Aortic stenosis, moderate  Asymptomatic.  August 2019 heart echo reviewed with moderate aortic stenosis, normal ejection fraction of 57%.  No pulmonary hypertension noted.    Recheck echo in 1 year.    7. Pulmonary emphysema, unspecified emphysema type (H)  Moderate dyspnea on exertion.    Every 2019 chest CT scan was negative for nodule.    8. History of tobacco use  Still smoking half pack a day.  I counseled him strongly to cut down or quit.    No change in cough or infectious bronchitis or sinusitis symptoms currently.    9. History of colonic polyps  Sessile colon polyps unable to be removed, were tattooed and he is due for his 1 year colonoscopy this month.  He will delay the colonoscopy and proceed with the eye surgery as planned.    I will see him in December and discuss proceeding with colonoscopy at that time.    10. Medication monitoring encounter    - Comprehensive Metabolic Panel  - HM2(CBC w/o Differential)     Surgical Procedure Risk: Low (reported cardiac risk generally < 1%)  Have you had prior anesthesia?: Yes  Have you or any family members had a previous anesthesia reaction:  No  Do you or any family members have a history of a clotting or bleeding  disorder?: Yes: Personal history of a stroke right retinal infarct, history of cerebellar infarcts on MRI  Cardiac Risk Assessment: no increased risk for major cardiac complications    APPROVAL GIVEN to proceed with proposed procedure, without further diagnostic evaluation    Please Note:  Patient does have a diagnosis of sleep apnea, but does not tolerate CPAP.    Functional Status: Independent  Patient plans to recover at home with family.     Subjective:      Richie Gordillo is a 78 y.o. male who presents for a preoperative consultation.  Long-standing blindness in right eye.  Pain in right eye.  Goal to reduce pain and to place prosthesis.  Stable vision in left eye.    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  See above documentation for details.    He also has peripheral artery disease with right iliac stent in 2009 but no leg claudication.    No chest pain or chest pressure.  Dyspnea on exertion is moderate but stable.    Moderate aortic stenosis stable on August echo as above.    Does not tolerate CPAP, history of sleep apnea.    Ambulates on a regular basis.  No ankle edema.    No falls.  No bleeding issues on aspirin and Plavix currently.    No perirectal abscess symptoms or cysts currently.  Denies urinary symptoms.  He has moderate BPH on Flomax.    Bowels are regular no blood in stool.    All other systems reviewed and are negative, other than those listed in the HPI.    No history of DVT.  No history of seizure.    Pertinent History  Do you have difficulty breathing or chest pain after walking up a flight of stairs: Yes: Moderate difficulty breathing  History of obstructive sleep apnea: Yes: No machine for this.  Steroid use in the last 6 months: Yes: Eye drops  Frequent Aspirin/NSAID use: Yes: Aspirin 81 mg daily  Prior Blood Transfusion: No  Prior Blood Transfusion Reaction: N/A  If for some reason prior to, during or after the  procedure, if it is medically indicated, would you be willing to have a blood transfusion?:  There is no transfusion refusal.    Current Outpatient Medications   Medication Sig Dispense Refill     amLODIPine (NORVASC) 5 MG tablet TAKE 1 TABLET DAILY 90 tablet 3     aspirin 81 MG EC tablet Take 1 tablet (81 mg total) by mouth daily. 90 tablet 3     atorvastatin (LIPITOR) 40 MG tablet Take 1 tablet (40 mg total) by mouth daily. 90 tablet 2     b complex vitamins tablet Take 2 tablets by mouth daily. 180 tablet 12     cholecalciferol, vitamin D3, 400 unit Tab Take 400 Units by mouth daily.       clopidogrel (PLAVIX) 75 mg tablet Take 1 tablet (75 mg total) by mouth daily. 90 tablet 1     cyanocobalamin (VITAMIN B-12) 500 MCG tablet Take 500 mcg by mouth daily.       ferrous gluconate 256 mg (28 mg iron) Tab Take 1 tablet by mouth daily.       guaiFENesin ER (MUCINEX) 600 mg 12 hr tablet Take 1,200 mg by mouth 2 (two) times a day as needed (Using 1-2 times per week as needed.).              losartan (COZAAR) 25 MG tablet Take 1 tablet (25 mg total) by mouth daily. 30 tablet 4     mirtazapine (REMERON) 30 MG tablet Take 1 tablet (30 mg total) by mouth at bedtime. 90 tablet 3     multivitamin therapeutic (THERAGRAN) tablet Take 1 tablet by mouth daily.       neomycin-polymyxin-dexamethasone (MAXITROL) 3.5mg/mL-10,000 unit/mL-0.1 % ophthalmic suspension Administer 1 drop to the right eye as needed.       omega-3 fatty acids-vitamin E (FISH OIL) 1,000 mg cap Take 1 capsule by mouth daily.       tamsulosin (FLOMAX) 0.4 mg cap Take 1 capsule (0.4 mg total) by mouth daily. 90 capsule 3     tiotropium (SPIRIVA WITH HANDIHALER) 18 mcg inhalation capsule Place 1 capsule (2 puffs total) into inhaler and inhale daily. Close and press button to crush and then inhale in 2 breaths. 90 capsule 5     TOPROL  mg 24 hr tablet TAKE 1 TABLET DAILY. 90 tablet 3     vit B comp no.3-folic-C-biotin (B COMPLEX-VITAMIN C-FOLIC ACID)  1- mg-mg-mcg Tab tablet Take 2 tablets by mouth daily. (Patient taking differently: Take 1 tablet by mouth daily.       ) 180 tablet 12     vitamin E acetate (VITAMIN E ORAL) Take 1 tablet by mouth daily.       No current facility-administered medications for this visit.         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     Hyponatremia     Innocent heart murmur     Depression     Other emphysema (H)     History of colonic polyps     History of stroke     Encounter for therapeutic drug monitoring     Carotid artery stenosis, asymptomatic, bilateral     Aortic stenosis, moderate       Past Medical History:   Diagnosis Date     Arthritis      BPH (benign prostatic hyperplasia)      Central retinal artery occlusion, right eye      Chronic kidney disease     CKD     Essential tremor      History of alcohol abuse     in remission     HTN (hypertension)      Hyperlipidemia      Hyponatremia      Leg mass, left      Murmur      PVD (peripheral vascular disease) (H)      Renal Tubular Acidosis      secondary to bladder outlet obstruction; resolved       Sleep apnea      Stroke (H)     TIA       Past Surgical History:   Procedure Laterality Date     CATARACT EXTRACTION       EXTERNAL EAR SURGERY      mastoid     EYE SURGERY       ILIAC ARTERY STENT Right     twice, 2009 and 2010     LEG SKIN LESION  BIOPSY / EXCISION Left 7/27/2017    Procedure: EXCISION LEFT POSTERIOR LEG MASS;  Surgeon: Joseluis Fernandez MD;  Location: [facility];  Service:      CO TRANSURETHRAL ELEC-SURG PROSTATECTOM       "Description: Transurethral Resection Of Prostate (TURP);  Recorded: 04/27/2009;       Social History     Socioeconomic History     Marital status:      Spouse name: Not on file     Number of children: Not on file     Years of education: Not on file     Highest education level: Not on file   Occupational History     Not on file   Social Needs     Financial resource strain: Not on file     Food insecurity:     Worry: Not on file     Inability: Not on file     Transportation needs:     Medical: Not on file     Non-medical: Not on file   Tobacco Use     Smoking status: Current Every Day Smoker     Packs/day: 0.50     Types: Cigarettes     Start date: 2/16/1962     Smokeless tobacco: Never Used   Substance and Sexual Activity     Alcohol use: No     Comment: hx alcohol abuse in remission     Drug use: No     Sexual activity: Yes     Partners: Female   Lifestyle     Physical activity:     Days per week: Not on file     Minutes per session: Not on file     Stress: Not on file   Relationships     Social connections:     Talks on phone: Not on file     Gets together: Not on file     Attends Congregational service: Not on file     Active member of club or organization: Not on file     Attends meetings of clubs or organizations: Not on file     Relationship status: Not on file     Intimate partner violence:     Fear of current or ex partner: Not on file     Emotionally abused: Not on file     Physically abused: Not on file     Forced sexual activity: Not on file   Other Topics Concern     Not on file   Social History Narrative    He is a retired  and last worked at [employer]. He also was a dental technician. He is  and has 3 children and 8 grandchildren.       Patient Care Team:  Justo Tom MD as PCP - General (Internal Medicine)  Justo Tom MD as Assigned PCP          Objective:     Vitals:    10/16/19 1116   BP: 110/62   Pulse: 64   Weight: 145 lb 12.8 oz (66.1 kg)   Height: 5' 7.75\" " (1.721 m)         Physical Exam:  Alert and oriented x3.  Good mood and affect.  Opacification of right cornea with nonfunctioning pupil.  Left pupil appears reactive with good range of ocular motion, no jaundice or conjunctivitis.  Pharynx is normal, no leukoplakia or pharyngitis.  Just mildly crowded pharynx.  Dental work appears in good repair, no active dental infection.  No cervical or supra clavicular adenopathy.  No JVD.    He does have bilateral carotid bruits versus radiation of the heart murmur.    Lungs are clear to auscultation with slight reduced breath sounds throughout but no wheezing or crackles.    Heart is regular with 3 out of 6 systolic murmur right upper sternal border.  No S3 or S4.    Abdomen nonobese nontender no hepatospleno megaly.  No pulsatile mass.  No ankle edema.    Able to climb up on exam table.    Patient Instructions   Continue aspirin daily.  You do not need to take aspirin on the day of surgery, however.    Nothing to eat after midnight on the day of surgery.    Do not take clopidogrel/Plavix for 10 days prior to your surgery.  You can restart the clopidogrel/Plavix with your aspirin the day after surgery, if no major bleeding.    Do not take the losartan on the morning of surgery.    Do take your amlodipine and metoprolol as you normally would take them.  If these are normally taken in the morning, do take them with a sip of water the morning of surgery.    Follow-up with me in 4 to 6 weeks to reevaluate blood pressure.  I will plan to discuss having you proceed with the colonoscopy at that time.    Reduce or quit smoking.      EKG: EKG personally reviewed by me as first-degree AV block, sinus rhythm.  Poor anterior R wave progression, similar to previous EKG.    Labs: Comprehensive metabolic panel and hemogram ordered.  Recent Results (from the past 240 hour(s))   Electrocardiogram Perform and Read    Collection Time: 10/16/19 11:36 AM   Result Value Ref Range    SYSTOLIC BLOOD  PRESSURE      DIASTOLIC BLOOD PRESSURE      VENTRICULAR RATE 56 BPM    ATRIAL RATE 56 BPM    P-R INTERVAL 218 ms    QRS DURATION 86 ms    Q-T INTERVAL 414 ms    QTC CALCULATION (BEZET) 399 ms    P Axis 84 degrees    R AXIS 32 degrees    T AXIS 109 degrees    MUSE DIAGNOSIS       Sinus bradycardia with 1st degree A-V block  Abnormal QRS-T angle, consider primary T wave abnormality  Abnormal ECG  When compared with ECG of 24-JUL-2017 09:59,  No significant change was found         Immunization History   Administered Date(s) Administered     DT (pediatric) 09/21/2005     Influenza high dose,seasonal,PF, 65+ yrs 02/13/2019, 09/26/2019     Influenza, inj, historic,unspecified 11/14/2011     Influenza, seasonal,quad inj 6-35 mos 01/14/2013     Pneumo Conj 13-V (2010&after) 04/06/2017     Pneumo Polysac 23-V 03/03/2009     Td,adult,historic,unspecified 09/21/2005     Tdap 09/28/2013     ZOSTER, LIVE 12/08/2011           Electronically signed by Justo Tom MD 10/16/19 11:23 AM

## 2021-06-03 NOTE — PATIENT INSTRUCTIONS - HE
Have your wife drive you to the emergency room now.    You have an appointment this Friday to follow-up with a nurse practitioner, this follow-up appointment can be changed depending on what happens in the hospital .  .  You will need to see cardiology for further evaluation for this.  You may be seen by cardiology in the hospital, otherwise this will be arranged through the clinic this month.    You will need to cancel your colonoscopy until further evaluation of your atrial fibrillation.    See me in January at my next available appointment.  Paddy Landers, nurse practitioner, will see you before January as needed.

## 2021-06-03 NOTE — PROGRESS NOTES
[facility] Follow Up Note    Richie Gordillo   79 y.o. 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  That was done on October 30.    Patient did not report any complications of the surgery itself.  He denies any major bleeding occurred.    Patient does have a history of carotid artery stenosis bilateral 50-69%.  History of right retinal artery ischemic event previously.    He did continue on the aspirin for the eye surgery.  He is back on Plavix as well as aspirin now.    Still on Lipitor 40 mg.    History of significant vascular disease with right iliac stent in 2009.    Patient's hypertension had been borderline controlled.  Blood pressure was around 140/60 a preop.  He had had an elevated potassium and elevated creatinine previously and had his losartan reduced to 25 mg a day prior to the surgery.    He continues on losartan 25 mg a day and amlodipine 5 mg a day.  He is not checked his blood pressure on his own.    His heart rate is usually low at 55-60 with first-degree AV block.    No history of atrial fibrillation or a flutter.    He does have moderate aortic stenosis seen on August 2019 echo which I reviewed again today.  Ejection fraction was 57%.  No pulmonary hypertension or wall motion abnormality.    Patient does have a history of moderate BPH but voiding adequately on Flomax.  No dysuria or hematuria.    Continues to smoke 1/2 pack a day.  Last chest CT scan February 2019 without mass or significant nodule.  Uses Spiriva inhaler once a day.  Has not been using albuterol inhaler.  He does complain of increasing wheeziness and shortness of breath over the past month.  It has remained relatively stable over the past 2 weeks.  He is not producing significant sputum.  No hemoptysis.  No fevers.  No  sinusitis symptoms or sore throat or URI symptoms.    No chest pain.    He denies any symptomatic palpitations.    He also complains of enlargement of a left sebaceous cyst on his left hand.  He had a previous keratotic plug.  No pain or drainage.    He has a past history of flat sessile polyp seen on October 2018 colonoscopy which were tattooed with a plan for repeat colonoscopy later this month.    PMHx:    Past Medical History:   Diagnosis Date     Arthritis      BPH (benign prostatic hyperplasia)      Central retinal artery occlusion, right eye      Chronic kidney disease     CKD     Essential tremor      History of alcohol abuse     in remission     HTN (hypertension)      Hyperlipidemia      Hyponatremia      Leg mass, left      Murmur      PVD (peripheral vascular disease) (H)      Renal Tubular Acidosis      secondary to bladder outlet obstruction; resolved       Sleep apnea      Stroke (H)     TIA     PSHx:    Past Surgical History:   Procedure Laterality Date     CATARACT EXTRACTION       EXTERNAL EAR SURGERY      mastoid     EYE SURGERY       ILIAC ARTERY STENT Right     twice, 2009 and 2010     LEG SKIN LESION  BIOPSY / EXCISION Left 7/27/2017    Procedure: EXCISION LEFT POSTERIOR LEG MASS;  Surgeon: Joseluis Fernandez MD;  Location: [facility];  Service:      UT TRANSURETHRAL ELEC-SURG PROSTATECTOM      Description: Transurethral Resection Of Prostate (TURP);  Recorded: 04/27/2009;     Immunizations:   Immunization History   Administered Date(s) Administered     DT (pediatric) 09/21/2005     Influenza high dose,seasonal,PF, 65+ yrs 02/13/2019, 09/26/2019     Influenza, inj, historic,unspecified 11/14/2011     Influenza, seasonal,quad inj 6-35 mos 01/14/2013     Pneumo Conj 13-V (2010&after) 04/06/2017     Pneumo Polysac 23-V 03/03/2009     Td,adult,historic,unspecified 09/21/2005     Tdap 09/28/2013     ZOSTER, LIVE 12/08/2011       ROS A comprehensive review of systems was performed and was  otherwise negative    Medications, allergies, and problem list were reviewed and updated    Exam  /80 (Patient Site: Right Arm, Patient Position: Sitting, Cuff Size: Adult Regular)   Pulse (!) 134   Wt 150 lb (68 kg)   SpO2 94%   BMI 22.98 kg/m    Right eye postsurgical removal of the globe.  There is no significant erythema or purulent drainage there.  Left eye within normal limits.  No jaundice.  Lungs with reduced respiratory excursion and expiratory wheezing bilaterally.  No crackles or dullness.  Heart is irregularly irregular with rapid ventricular rate.  3 out of 6 systolic murmur noted previously.  I did not hear a rub.  He does have +1 lower extremity edema to the knee which is new, not seen on his preop.  Abdomen is nontender, nonobese.    Assessment/Plan  1. Atrial fibrillation with RVR (H)  New atrial fibrillation episode.  No previous atrial fibrillation.    Baseline rhythm is sinus bradycardia with first-degree AV block.    He would be at risk for bradycardia or heart block with beta-blocker.    I spoke with the ER provider on call at [facility]. (Dr. Andersen).  I gave history of atrial fibrillation with rapid ventricular rate.  Wife will bring patient to [facility] which is just across the parking lot.  Further evaluation and management per hospital team.    With history of stroke and carotid artery stenosis and now atrial fibrillation, I would anticipate him changing his anticoagulation to Xarelto or Eliquis type blood thinner.    He is currently on aspirin and Plavix, because of the history of the retinal infarct.  2. Tachycardia  Atrial fibrillation as above.  I did personally read the EKG and reviewed with patient.  - Electrocardiogram Perform and Read    He does not have a history of heart attack or known coronary disease.  He denies chest pain.    3. Essential hypertension  Blood pressures mildly higher today.  He has had elevated creatinine and potassium with higher doses of  losartan in the past.  He does have known vascular disease, possible renal artery stenosis.    Continue amlodipine 5 mg a day.    He will need his labs checked in the ER.    Plan for losartan to be determined in the hospital.    Lower extremity edema is new, likely related to his atrial fibrillation and pulmonary exacerbation.    Could consider a low-dose furosemide to help with edema, but has had a history of low sodium and I would not use a thiazide diuretic.    4. Chronic kidney disease, unspecified CKD stage  October 16 creatinine was 1.4.  Potassium 5.2.    5. Carotid artery stenosis, asymptomatic, bilateral  August 2019 carotid ultrasound 50-69% bilateral.  History of retinal infarct.    Has been on Lipitor 40 mg.  Aspirin and Plavix.    6. History of stroke  No new event since October evaluation.    7. Aortic stenosis, moderate  Asymptomatic.  Plan to repeat echo next year.    8. Pulmonary emphysema, unspecified emphysema type (H)  Moderate exacerbation with continued smoking and recent hospitalization for eye surgery.    On Spiriva.    Could benefit from as needed bronchodilator, but currently having rapid atrial fibrillation issue.    Pulmonary exacerbation may have been the underlying cause of the atrial fibrillation event.    I would anticipate chest imaging in the emergency room and further evaluation in the hospital.    Patient does not follow with pulmonary medicine.    He continues to smoke half pack a day.    9. History of colonic polyps  Patient was told to cancel colonoscopy until further clarification of his atrial fibrillation and blood thinner treatment plan.  I did discuss risk of undiagnosed colon cancer with delay of colonoscopy.    10. Benign prostatic hyperplasia without lower urinary tract symptoms, prior TURP  Moderate.  Controlled with Flomax    11. Medication monitoring encounter  Labs to be done in ER    12. Sebaceous cyst  Noninfected 1 cm sebaceous cyst on left hand.  Patient wishes  to wait on his dermatology referral until January.  Patient was warned about infectious risk and to seek medical attention immediately if increasing redness or swelling or pain or drainage.    Time with patient greater than 40 minutes with greater than 50% of the time coordination of care.  Time face-to-face.    Return in about 4 weeks (around 12/30/2019).   Patient Instructions   Have your wife drive you to the emergency room now.    You have an appointment this Friday to follow-up with a nurse practitioner, this follow-up appointment can be changed depending on what happens in the hospital .  .  You will need to see cardiology for further evaluation for this.  You may be seen by cardiology in the hospital, otherwise this will be arranged through the clinic this month.    You will need to cancel your colonoscopy until further evaluation of your atrial fibrillation.    See me in January at my next available appointment.  Paddy Landers, nurse practitioner, will see you before January as needed.    Justo Tom MD        Current Outpatient Medications   Medication Sig Dispense Refill     amLODIPine (NORVASC) 5 MG tablet TAKE 1 TABLET DAILY 90 tablet 3     aspirin 81 MG EC tablet Take 1 tablet (81 mg total) by mouth daily. 90 tablet 3     atorvastatin (LIPITOR) 40 MG tablet Take 1 tablet (40 mg total) by mouth daily. 90 tablet 2     b complex vitamins tablet Take 2 tablets by mouth daily. 180 tablet 12     cholecalciferol, vitamin D3, 400 unit Tab Take 400 Units by mouth daily.       clopidogrel (PLAVIX) 75 mg tablet Take 1 tablet (75 mg total) by mouth daily. 90 tablet 1     cyanocobalamin (VITAMIN B-12) 500 MCG tablet Take 500 mcg by mouth daily.       ferrous gluconate 256 mg (28 mg iron) Tab Take 1 tablet by mouth daily.       losartan (COZAAR) 25 MG tablet Take 1 tablet (25 mg total) by mouth daily. 30 tablet 4     mirtazapine (REMERON) 30 MG tablet Take 1 tablet (30 mg total) by mouth at bedtime. 90 tablet 3      multivitamin therapeutic (THERAGRAN) tablet Take 1 tablet by mouth daily.       omega-3 fatty acids-vitamin E (FISH OIL) 1,000 mg cap Take 1 capsule by mouth daily.       tamsulosin (FLOMAX) 0.4 mg cap Take 1 capsule (0.4 mg total) by mouth daily. 90 capsule 3     tiotropium (SPIRIVA WITH HANDIHALER) 18 mcg inhalation capsule Place 1 capsule (2 puffs total) into inhaler and inhale daily. Close and press button to crush and then inhale in 2 breaths. 90 capsule 5     TOPROL  mg 24 hr tablet TAKE 1 TABLET DAILY 90 tablet 3     vit B comp no.3-folic-C-biotin (B COMPLEX-VITAMIN C-FOLIC ACID) 1- mg-mg-mcg Tab tablet Take 2 tablets by mouth daily. (Patient taking differently: Take 1 tablet by mouth daily.       ) 180 tablet 12     vitamin E acetate (VITAMIN E ORAL) Take 1 tablet by mouth daily.       guaiFENesin ER (MUCINEX) 600 mg 12 hr tablet Take 1,200 mg by mouth 2 (two) times a day as needed (Using 1-2 times per week as needed.).              No current facility-administered medications for this visit.      Allergies   Allergen Reactions     Ofloxacin Hives     Quinolones Itching     Social History     Tobacco Use     Smoking status: Current Every Day Smoker     Packs/day: 0.50     Types: Cigarettes     Start date: 2/16/1962     Smokeless tobacco: Never Used   Substance Use Topics     Alcohol use: No     Comment: hx alcohol abuse in remission     Drug use: No

## 2021-06-03 NOTE — TELEPHONE ENCOUNTER
Refill Approved    Rx renewed per Medication Renewal Policy. Medication was last renewed on 11/14/18.    Abigail Zhou, [facility] Triage/Med Refill 11/19/2019     Requested Prescriptions   Pending Prescriptions Disp Refills     TOPROL  mg 24 hr tablet 90 tablet 3     Sig: TAKE 1 TABLET DAILY       Beta-Blockers Refill Protocol Passed - 11/18/2019  3:49 PM        Passed - PCP or prescribing provider visit in past 12 months or next 3 months     Last office visit with prescriber/PCP: 9/26/2019 Justo Tom MD OR same dept: 9/26/2019 Justo Tom MD OR same specialty: 9/26/2019 Justo Tom MD  Last physical: 10/16/2019 Last MTM visit: Visit date not found   Next visit within 3 mo: Visit date not found  Next physical within 3 mo: Visit date not found  Prescriber OR PCP: Justo Tom MD  Last diagnosis associated with med order: 1. Hypertension  - TOPROL  mg 24 hr tablet; TAKE 1 TABLET DAILY  Dispense: 90 tablet; Refill: 3    If protocol passes may refill for 12 months if within 3 months of last provider visit (or a total of 15 months).             Passed - Blood pressure filed in past 12 months     BP Readings from Last 1 Encounters:   10/16/19 110/62

## 2021-06-04 NOTE — PROGRESS NOTES
Post Discharge Medication Reconciliation Status: discharge medications reconciled, continue medications without change      Clinic Note    Assessment:     Assessment and Plan:  1. Atrial fibrillation with rapid ventricular response (H)  His heart rate is 66 today.  Blood pressure is 118/70.  He appears to be tolerating his new medications and appears to be well compensated today from a fluid standpoint.  We will recheck labs listed below.  He has follow-up with the cardiology nurse practitioner in about 10 days.  See patient instructions below for plan of care.  - Digoxin (Lanoxin )  - Basic Metabolic Panel  - Magnesium  - BNP(B-type Natriuretic Peptide)    2. Acute systolic heart failure (H)  Please see #1  - Digoxin (Lanoxin )  - Basic Metabolic Panel  - Magnesium  - BNP(B-type Natriuretic Peptide)       Patient Instructions   We will check a variety of labs today.    I will inform you of results on my chart.  If there is something serious, we will call.    You are due to follow-up with the cardiology nurse practitioner later this month.  The atrial fibrillation nurse practitioner after that.    We will see about getting you in sooner with Dr. Justo Tom.  Right now, you are due to see him in late January.    Blood pressure and vital signs look good.    Check weight every day.    Check blood pressure 2-3 times per week, for now.    Minimize salt intake.  Keep legs elevated when sitting.    Return in about 1 month (around 1/6/2020).         Subjective:      Patient comes the clinic today for hospital discharge follow-up.    He initially presented to the [facility] clinic for a follow-up appointment with his PCP, Dr. Justo Tom on 12/2.    At that appointment, patient had been having some increased swelling in his lower extremities, as well as some shortness of breath symptoms.    EKG revealed atrial fibrillation with RVR.  Heart rate in the 120s to 130s.  This was a new atrial fibrillation episode with no history  of previous atrial fibrillation.  His baseline rhythm was sinus bradycardia with first-degree AV block.    He was told to present to the ED, where he was eventually admitted for work-up.    He had an echocardiogram which revealed normal left ventricular size and systolic performance, ejection fraction 55%.  Mild concentric increase in left ventricular wall thickness.  Mild/moderate aortic stenosis.  PFO.    His metoprolol was increased to 100 mg twice daily.  Digoxin was added.  Cardizem  was added.    IV diuresis with improvement in swelling and shortness of breath.    Creatinine did increase from baseline 1.3-1.68.    Started on Xarelto.    Magnesium replaced.    Today, patient states that he is doing well.  His symptoms have largely abated.  He is able to ambulate around his home without shortness of breath or chest pain.  His lower extremity swelling has dissipated.    He understands that he is to start checking his weights on a regular basis.  He has plans for checking his blood pressure on a regular basis as well.    He has follow-up with the cardiology nurse practitioner in 11 days.  He is seeing the atrial fibrillation nurse practitioner in early January.    The following portions of the patient's history were reviewed and updated as appropriate: Allergies, medications, problems, prior note.    Review of Systems:    Review is otherwise negative except for what is mentioned above.     Social Hx:    Social History     Tobacco Use   Smoking Status Current Every Day Smoker     Packs/day: 0.50     Types: Cigarettes     Start date: 2/16/1960   Smokeless Tobacco Never Used         Objective:     Vitals:    12/06/19 0803   BP: 118/70   Pulse: 66   Weight: 140 lb (63.5 kg)       Exam:    General: No apparent distress. Calm. Alert and Oriented X3. Pt behavior is appropriate.  Patient is accompanied by wife.  Chest/Lungs: Normal chest wall, clear to auscultation, normal respiratory effort and rate.    Heart/Pulses: Irregularly irregular.  Strong and equal radial pulses, 2/6 NUNO. Capillary refill <2 seconds.  Trace edema bilaterally      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     Hyponatremia     Innocent heart murmur     Depression     Pulmonary emphysema, unspecified emphysema type (H)     History of colonic polyps     History of stroke     Encounter for therapeutic drug monitoring     Carotid artery stenosis, asymptomatic, bilateral     Aortic stenosis, moderate     Atrial fibrillation with rapid ventricular response (H)     Acute systolic heart failure (H)     Bilateral carotid artery stenosis     Acute diastolic congestive heart failure (H)     Current Outpatient Medications   Medication Sig Dispense Refill     aspirin 81 MG EC tablet Take 1 tablet (81 mg total) by mouth daily. 90 tablet 3     atorvastatin (LIPITOR) 40 MG tablet Take 1 tablet (40 mg total) by mouth daily. 90 tablet 2     b complex vitamins tablet Take 1 tablet by mouth daily.       cholecalciferol, vitamin D3, 400 unit Tab Take 400 Units by mouth daily.       cyanocobalamin (VITAMIN B-12) 500 MCG tablet Take 500 mcg by mouth daily.       digoxin (LANOXIN) 125 mcg (0.125 mg) tablet Take 1 tablet (125 mcg total) by mouth daily with supper. 30 tablet 0     diltiazem (CARDIZEM CD) 120 MG 24 hr capsule Take 1 capsule (120 mg total) by mouth daily. 30 capsule 0     ferrous gluconate 256 mg (28 mg iron) Tab Take 1 tablet by mouth daily.       furosemide (LASIX) 40 MG tablet Take 1 tablet (40 mg total) by mouth daily. 30 tablet 0     guaiFENesin ER (MUCINEX) 600 mg 12 hr tablet  Take 1,200 mg by mouth 2 (two) times a day as needed (Using 1-2 times per week as needed.).              losartan (COZAAR) 25 MG tablet Take 1 tablet (25 mg total) by mouth daily. 30 tablet 4     mirtazapine (REMERON) 30 MG tablet Take 1 tablet (30 mg total) by mouth at bedtime. 90 tablet 3     multivitamin therapeutic (THERAGRAN) tablet Take 1 tablet by mouth every evening.        omega-3 fatty acids-vitamin E (FISH OIL) 1,000 mg cap Take 1,000 mg by mouth every evening.        rivaroxaban (XARELTO) 15 mg tablet Take 1 tablet (15 mg total) by mouth daily with supper. 30 tablet 0     tamsulosin (FLOMAX) 0.4 mg cap Take 1 capsule (0.4 mg total) by mouth daily. 90 capsule 3     tiotropium (SPIRIVA WITH HANDIHALER) 18 mcg inhalation capsule Place 1 capsule (2 puffs total) into inhaler and inhale daily. Close and press button to crush and then inhale in 2 breaths. 90 capsule 5     TOPROL  mg 24 hr tablet Take 1 tablet (100 mg total) by mouth 2 (two) times a day. TAKE 1 TABLET DAILY 180 tablet 3     vitamin E acetate (VITAMIN E ORAL) Take 1 tablet by mouth daily.       No current facility-administered medications for this visit.          Juan Jose Landers (Rob), BENSON    12/6/2019

## 2021-06-04 NOTE — PROGRESS NOTES
Patient and his wife, Sharmin, seen in clinic for HF education s/p recent hospital discharge 12/5/19.  Reviewed HF Binder that includes the  HF Sx Awareness & Action plan  handout and  A Stronger Pump  booklet and Weight log booklet highlighting :  __X_patient s type of heart failure _X__Na management in diet  __X_importance of daily wts  _X__Fluid Guidelines, if applicable  __X_medication review and importance of compliance     Instructed patient and his wife in signs and sx of heart failure, reiterated when to call clinic - reviewed HF hotline # [phone] and after hours call # [phone].  Majority of time was spent reviewing: signs and symptoms of when to call the heart failure line. Patient's wife, Sharmin, is very supportive and is following and preparing low sodium meals at home.   Patient verbalized understanding of HF discussion.  Plan for f/u with continued HF education reviewed.  Follow up with Clinton TAYLOR 1/8/20, February with Kika TAYLOR and with Dr. Perez in March.

## 2021-06-04 NOTE — PATIENT INSTRUCTIONS - HE
Richie Gordillo,    It was a pleasure to see you today at [facility].     My recommendations after this visit include:  - Please follow up with Dr Perez in March   - Please follow up with Clinton Byers January 8  - Please follow up with Kika Perez beginning of February   - I have stopped your losartan due to low blood pressure    iKka Perez CNP      What is the [facility] Failure Program?     The [facility] Failure Program is a heart failure specialty clinic within [facility].  You will work with your cardiologist, nurse practitioner, and nurses to carefully adjust medications and learn how to live with heart failure.  The Heart Failure Program will help you:      Better understand your chronic heart condition    Feel better and avoid hospital stays    Monitoring for Symptoms      Call the Heart Failure Phone Line ([phone]) if you have any of these symptoms:     Increased shortness of breath/shortness of breath at rest    Waking up at night with difficulty breathing    Unable to lie down for sleep due to symptoms or needing to sit upright for sleep    Weight gain of 2 pounds a day for 2 days in a row OR 5 pounds in 1 week    Increased swelling in your ankles or legs    Dizziness or lightheadedness    Medications       Take your medications as prescribed    Bring all your medications in their original bottles to every appointment    Avoid non-steroidal anti-inflammatory medications (Advil, Aleve, Ibuprofen, Naprosyn, Naproxen, Celebrex)    Do not stop taking your medications or begin taking over-the-counter or herbal medications without first talking to your doctor or nurse practitioner    Diet and Lifestyle       Limit sodium/salt to 2000 mg daily   o Read food labels for sodium content  o Do not add salt when cooking or add salt at the table    Weigh yourself every day and record in your daily weight log   o Call if you gain 2 pounds a day for 2  days in a row OR 5 pounds in 1 week  o Bring daily weight log to every appointment    Stay active, pace yourself, listen to your body, and rest when tired    Elevate your legs if they are swollen. Ask about using compression/support stockings    Stop smoking    Lose weight if you are overweight    Avoid drinking alcohol or limit amount    Stay updated on your immunizations including flu and pneumonia vaccines

## 2021-06-04 NOTE — PATIENT INSTRUCTIONS - HE
We will check a variety of labs today.    I will inform you of results on my chart.  If there is something serious, we will call.    You are due to follow-up with the cardiology nurse practitioner later this month.  The atrial fibrillation nurse practitioner after that.    We will see about getting you in sooner with Dr. Justo Tom.  Right now, you are due to see him in late January.    Blood pressure and vital signs look good.    Check weight every day.    Check blood pressure 2-3 times per week, for now.    Minimize salt intake.  Keep legs elevated when sitting.

## 2021-06-05 NOTE — PROGRESS NOTES
1940  Home:[phone] (home) Cell:[phone] (mobile)  Emergency Contact: Stephanie Gordillo [phone]    +++Important patient information for CSC/Cath Lab staff : PT IS ON XARELTO,PT IS KEISHA/CPAP+++    [facility] EP Cath Lab Procedure Order     Cardioversion:  Cardioversion     PT IS ON XARELTO AND NO MISSED DOSES  Diagnosis:  AF  Anticipated Case Duration:  Standard  Scheduling Needs/Timeframe:  PT IS ON FOR THURS 1/30/2020 AT 12 WITH THOMAS RIGGINS CNP    Current Device: None None  Device Company/Device Rep Needed for Procedure: None    Anesthesia:  General-CV Only  Research Protocol:  No    [facility] EP Cath Lab Prep   Ordering Provider: Clinton Byers NP  Ordering Date: 1/8/2020  Orders Status: Intial order placed and Order set placed    H&P:  Compled by CLINTON BYERS CNP on 1/8/2020 if scheduled within 30 days, pt to schedule with PMD if procedure outside of this timeframe  PCP: Justo Tom MD, [phone]    Pre-op Labs: N/A for procedure    Medical Records Pertinent for Procedure:  N/A    Patient Education:    PT HAS A  FOR PROCEDURE THAT WILL STAY WITH HIM AND BE PRESENT FOR DISCHARGE AND POST CV 24 HR MONITORING  PT INSTRUCTED TO HOLD ANY VITAMINS,MINERALS, CALCIUM, IRON OR SUPPLEMENTS THE MORNING OF CV  PT INSTRUCTED NO GUM CHEWING, MINTS OR CALCIUM THE MORNING OF CV  PT INSTRUCTED TO BATHE OR SHOWER BEFORE COMING IN  PT INSTRUCTED TO LEAVE JEWELRY AT HOME  PT IS ON XARELTO  PT IS KEISHA/CPAP  PT INSTRUCTED PER CLINTON BYERS CNP TO HOLD HIS DIGOXIN,DILTIAZEM AND METOPROLOL THE MORNING OF HIS CV  PT HAS A FOLLOW UP WITH ASHLEY JEFFERSON HF NURSE 2/18 AND IS TO KEEP THAT APPT       Teach with Patient: Completed via phone on 1/23/2020    Risks Reviewed:     Cardioversion    >90% acute success rate, <10% failure to convert or   reverts shortly after cardioversion.    <1% embolic event of (CVA, pulmonary embolism, or   1. other site).    75% risk for superficial burn.  Risks associated with general  anesthesia will be addressed by the Anesthesiology Department    Pre-Procedure Instructions that were Reviewed with Patient:  NPO after midnight, Remove all jewelry prior to coming in for procedure, Shower prior to arrival, Transportation arrangements needed s/p procedure, Post-procedure follow up process and Sedation plan/orders    Pre-Procedure Medication Instructions:  Instructions given to pt regarding anticoagulants: Xarelto- instructed to continue anticoagulation uninterrupted through their procedure  Instructions given to pt regarding antiarrhythmic medication: Beta Blocker; Pt instructed to continue medication prior to procedure  Instructions for medication, other than anticoagulants/antiarrhythmics listed above, given to pt: to take all morning medications with small sips of water, with the exception of OTC supplements and MVI    Allergies   Allergen Reactions     Ofloxacin Hives     Quinolones Itching       Current Outpatient Medications:      aspirin 81 MG EC tablet, Take 1 tablet (81 mg total) by mouth daily., Disp: 90 tablet, Rfl: 3     atorvastatin (LIPITOR) 40 MG tablet, Take 1 tablet (40 mg total) by mouth daily., Disp: 90 tablet, Rfl: 2     cholecalciferol, vitamin D3, 400 unit Tab, Take 400 Units by mouth daily., Disp: , Rfl:      cyanocobalamin (VITAMIN B-12) 500 MCG tablet, Take 500 mcg by mouth daily., Disp: , Rfl:      digoxin (LANOXIN) 125 mcg (0.125 mg) tablet, Take 1 tablet (125 mcg total) by mouth daily with supper., Disp: 90 tablet, Rfl: 3     diltiazem (CARDIZEM CD) 120 MG 24 hr capsule, Take 1 capsule (120 mg total) by mouth daily., Disp: 90 capsule, Rfl: 3     ferrous gluconate 256 mg (28 mg iron) Tab, Take 1 tablet by mouth daily., Disp: , Rfl:      furosemide (LASIX) 40 MG tablet, Take 1 tablet (40 mg total) by mouth daily., Disp: 90 tablet, Rfl: 3     guaiFENesin ER (MUCINEX) 600 mg 12 hr tablet, Take 1,200 mg by mouth 2 (two) times a day as needed (Using 1-2 times per week as  needed.).    , Disp: , Rfl:      mirtazapine (REMERON) 30 MG tablet, Take 1 tablet (30 mg total) by mouth at bedtime., Disp: 90 tablet, Rfl: 3     multivitamin therapeutic (THERAGRAN) tablet, Take 1 tablet by mouth every evening. , Disp: , Rfl:      omega-3 fatty acids-vitamin E (FISH OIL) 1,000 mg cap, Take 1,000 mg by mouth every evening. , Disp: , Rfl:      rivaroxaban (XARELTO) 15 mg tablet, Take 1 tablet (15 mg total) by mouth daily with supper., Disp: 90 tablet, Rfl: 3     tamsulosin (FLOMAX) 0.4 mg cap, Take 1 capsule (0.4 mg total) by mouth daily., Disp: 90 capsule, Rfl: 3     tiotropium (SPIRIVA WITH HANDIHALER) 18 mcg inhalation capsule, Place 1 capsule (2 puffs total) into inhaler and inhale daily. Close and press button to crush and then inhale in 2 breaths., Disp: 90 capsule, Rfl: 5     TOPROL  mg 24 hr tablet, Take 1 tablet (100 mg total) by mouth every evening., Disp: 90 tablet, Rfl: 3     vitamin E acetate (VITAMIN E ORAL), Take 1 tablet by mouth daily., Disp: , Rfl:     Documentation Date:1/23/2020 1:36 PM  Benito Reveles LPN

## 2021-06-05 NOTE — PATIENT INSTRUCTIONS - HE
Richie Gordillo,    It was a pleasure to see you today at the [facility].     My recommendations after this visit include:    Please call my nurse if you have questions about the cardioversion or you have missed tablets of your Xarelto.    *Instructions for the day of cardioversion:  #1 Nothing to eat or drink for 8 hours before your procedure.  #2 Okay to take meds in the morning with water except no metoprolol, diltiazem or digoxin that morning.  I anticipate you will stop diltiazem and digoxin and go to metoprolol succinate 100 mg at bedtime only and add back amlodipine 5 mg 1 tab every morning and start back the morning of cardioversion.   #3 Please hold vitamins, supplements  the day of cardioversion.  #4 You will need a  and  and someone to accompany you for procedure.  It will take 3-4 hrs.  #5 This is an outpatient procedure and done at [facility].   #6  Bathe or shower before coming in.  #7 Leave jewelry at home.    You need 21 days of continuous use of Xarelto before cardioversion.    Cardioversion will be ordered today and you will get a call from Zully to decide when you are ready for cardioversion.      To followup with Kika as planned on Feb 18.      My contact information:  Clinton Byers CNP  After Hours or Scheduling  [phone]  My Nurse---Zully Reveles [phone]

## 2021-06-05 NOTE — PATIENT INSTRUCTIONS - HE
Continue on Xarelto and aspirin.  You should not be on Plavix while you are on Xarelto.    Proceed as planned with the cardioversion with medication changes as ordered by cardiology.    Try to walk every day in the house.    Follow-up with me in later February to improve your conditioning for general checkup.  I will plan to discuss colonoscopy and your yearly chest CT scan for screening at that time.

## 2021-06-05 NOTE — TELEPHONE ENCOUNTER
RN Triage:     Patient ran out of Toprolol 100 mg XL 24 hours BID daily he stated he was directed to do this in the hospital. Ran out of the medication and has not taken any for the last 3-4 days.    BP is 105-107/64-69  P= 80, 62, 96    [facility] on [address].     Short term prescription for Toprol 100 mg XL daily sent to local pharmacy. Passed RN refill protocol.     Just to clarify prescription was decreased from 100 mg XL two times a day to daily per new prescription on 1/15/20.    Patient was encouraged to call 24/7 for any prescription problems or elevated BP or heart rate.     Dona Briones RN, BSN [facility] Triage Nurse

## 2021-06-05 NOTE — TELEPHONE ENCOUNTER
FYI - Status Update  Who is Calling: Patient  Update: Patient called back in and said that the pharmacy will not refill this medication for the patient, as of 1/22/20. Pharmacy is telling the patient that it is too soon to fill the medication and it is not able to be refilled until 1/25/20. Patient is wondering if there is anything the PCP can do to help the patient with this, since the patient is out of the medication. Please call the patient back to discuss this further, thank you.  Okay to leave a detailed message?:  Yes

## 2021-06-05 NOTE — TELEPHONE ENCOUNTER
Medication Request  Medication name: Toprol  mg twice a day  Requested Pharmacy: "[facility]"  Reason for request: New Rx. Patient's dose was changed in ER and now he needs a new Rx sent to [email or web address]. Please let him know if cardiology must fill this Rx. Patient saw [facility] for a consult on 1/8/20.  When did you use medication last?:  today  Patient offered appointment:  N/A. Patient saw Juan Jose Landers CNP on 12/6/19, for his hospital follow up.  Okay to leave a detailed message: yes  [phone]

## 2021-06-05 NOTE — PROGRESS NOTES
Pt was seen by Clinton yesterday see note CV ordered no device  Called pt today spoke to wife to confirm meds, pt thinks he did miss a dose last week but has not missed any since Sat 1/4  Plan CV the end of the month, and is pre booked for 1/30 due to their schedules   Wife will call in each week with update on meds, reviewed to be sure with full meal  They have my direct number for contact, understand and agrees to plan

## 2021-06-05 NOTE — ANESTHESIA POSTPROCEDURE EVALUATION
Patient: Richie Gordillo  * No procedures listed *  Anesthesia type: general    Patient location: Telemetry/Step Down Unit  Last vitals:   Vitals Value Taken Time   /71 1/30/2020  2:01 PM   Temp  1/30/2020  2:07 PM   Pulse 57 1/30/2020  2:05 PM   Resp 24 1/30/2020  2:05 PM   SpO2 98 % 1/30/2020  2:05 PM   Vitals shown include unvalidated device data.  Post vital signs: stable  Level of consciousness: lethargic and responds to simple questions  Post-anesthesia pain: pain controlled  Post-anesthesia nausea and vomiting: no  Pulmonary: unassisted, return to baseline  Cardiovascular: stable and blood pressure at baseline  Hydration: adequate  Anesthetic events: no    QCDR Measures:  ASA# 11 - Angie-op Cardiac Arrest: ASA11B - Patient did NOT experience unanticipated cardiac arrest  ASA# 12 - Angie-op Mortality Rate: ASA12B - Patient did NOT die  ASA# 13 - PACU Re-Intubation Rate: ASA13B - Patient did NOT require a new airway mgmt  ASA# 10 - Composite Anes Safety: ASA10A - No serious adverse event    Additional Notes:

## 2021-06-05 NOTE — TELEPHONE ENCOUNTER
Cardiology saw patient on January 8.  Metoprolol XL was lowered from 100 mg twice a day, down to 100 mg once a day in the evening.  Prescription changed, I did send in refill.   1 pair

## 2021-06-05 NOTE — ANESTHESIA PREPROCEDURE EVALUATION
Anesthesia Evaluation      Patient summary reviewed   No history of anesthetic complications     Airway   Mallampati: II  Neck ROM: full   Pulmonary - normal exam   (+) COPD, sleep apnea, a smoker                         Cardiovascular   (+) hypertension, dysrhythmias, ,     ECG reviewed  Rhythm: irregular        Neuro/Psych    (+) CVA ,     Endo/Other       GI/Hepatic/Renal    (+)   chronic renal disease,           Dental - normal exam                        Anesthesia Plan  Planned anesthetic: general mask    ASA 3   Induction: intravenous   Anesthetic plan and risks discussed with: patient    Post-op plan: routine recovery

## 2021-06-05 NOTE — ANESTHESIA CARE TRANSFER NOTE
Last vitals:   Vitals:    01/30/20 1328   BP: 130/63   Pulse: 62   Resp: 20   Temp:    SpO2: 100%     Patient's level of consciousness is drowsy  Spontaneous respirations: yes  Maintains airway independently: yes  Dentition unchanged: yes  Oropharynx: oropharynx clear of all foreign objects    QCDR Measures:  ASA# 20 - Surgical No data recorded  PQRS# 430 - Adult PONV Prevention: NA - Not adult patient, not GA or 3 or more risk factors NOT present  ASA# 8 - Peds PONV Prevention: NA - Not pediatric patient, not GA or 2 or more risk factors NOT present  PQRS# 424 - Angie-op Temp Management: NA - MAC anesthesia or case < 60 minutes  PQRS# 426 - PACU Transfer Protocol:NA - Patient did not go to PACU  ASA# 14 - Acute Post-op Pain: ASA14B - Patient did NOT experience pain >= 7 out of 10

## 2021-06-05 NOTE — PROGRESS NOTES
[facility] clinic Follow Up Note    Richie Gordillo   79 y.o. male    Date of Visit: 1/24/2020    Chief Complaint   Patient presents with     Follow-up     1 month recheck on blood pressure, A-fib, and other medical issues     Subjective  Richie is here for follow-up on the new diagnosis of atrial fib flutter, hypertension and other medical issues.    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  His new prosthesis is fitting well.    On December 2 at postop follow-up he saw me with asymptomatic atrial fib flutter with rapid heart rate.  He was sent to the hospital.    Heart rate was difficult to control but eventually controlled on diltiazem 120 mg a day, digoxin 125 mcg a day and Toprol- mg a day.    No longer on amlodipine and losartan.  His blood pressure was quite low in December at follow-up and those were discontinued.    He has a cardioversion planned for January 30.    I did review the cardiology note from January 8.  Plan was to stop the digoxin diltiazem and metoprolol the morning of cardioversion and restart amlodipine.    I did review December 2019 heart echo.  He still had a mild to moderate aortic stenosis.  Mild LVH.  Ejection fraction 55% but atrial size was good.    No chest pain or chest pressure.  No history of ischemic coronary artery disease.    He does have a history of bilateral carotid artery stenosis 50-69% on August 2019 ultrasound.    Previous right retinal artery ischemic event.    Previous right iliac stent.  No lower extremity claudication.    He had been on aspirin and Plavix but is now just on aspirin in addition to the Xarelto.    Still on Lipitor 40 mg.  October 2019 liver tests normal.    He does have chronic renal insufficiency with a creatinine of 1.6-1.8.  Labs from December 6, 2019 reviewed by me today with a creatinine of 1.75 and potassium 4.1.    No lower extremity edema now with Lasix 40 mg.  He had presented with +1 lower  extremity edema last month.    He continues to smoke 1/2 pack a day with history of COPD.  He denies any worsening shortness of breath or new cough.  December 2019 chest x-ray reviewed by me today and was negative.  February 2019 screening chest CT scan was negative for nodule.  He is on Spiriva and albuterol.    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  He is going to see dermatology next week.    Past history of flat sessile polyp October 2018 with a 1 year follow-up colonoscopy planned for last fall but he is still not have that done.  No blood in stool or melena.  No abdominal pain.    Patient had a fall on January 10 with a small right wrist avulsion fracture.  Is in a brace.  No neuropathy symptoms of the hand.    Moderate BPH stable on Flomax.    Lives independently with wife, Sharmin.    PMHx:    Past Medical History:   Diagnosis Date     Arthritis      BPH (benign prostatic hyperplasia)      Central retinal artery occlusion, right eye      Chronic kidney disease     CKD     Essential tremor      History of alcohol abuse     in remission     HTN (hypertension)      Hyperlipidemia      Hyponatremia      Leg mass, left      Murmur      PVD (peripheral vascular disease) (H)      Renal Tubular Acidosis      secondary to bladder outlet obstruction; resolved       Sleep apnea      Stroke (H)     TIA     PSHx:    Past Surgical History:   Procedure Laterality Date     CATARACT EXTRACTION       EXTERNAL EAR SURGERY      mastoid     EYE SURGERY       ILIAC ARTERY STENT Right     twice, 2009 and 2010     LEG SKIN LESION  BIOPSY / EXCISION Left 7/27/2017    Procedure: EXCISION LEFT POSTERIOR LEG MASS;  Surgeon: Joseluis Fernandez MD;  Location: [facility];  Service:      NH TRANSURETHRAL ELEC-SURG PROSTATECTOM      Description: Transurethral Resection Of Prostate (TURP);  Recorded: 04/27/2009;     Immunizations:    Immunization History   Administered Date(s) Administered     DT (pediatric) 09/21/2005     Influenza high dose,seasonal,PF, 65+ yrs 02/13/2019, 09/26/2019     Influenza, inj, historic,unspecified 11/14/2011     Influenza, seasonal,quad inj 6-35 mos 01/14/2013     Pneumo Conj 13-V (2010&after) 04/06/2017     Pneumo Polysac 23-V 03/03/2009     Td,adult,historic,unspecified 09/21/2005     Tdap 09/28/2013     ZOSTER, LIVE 12/08/2011       ROS A comprehensive review of systems was performed and was otherwise negative    Medications, allergies, and problem list were reviewed and updated    Exam  /65 (Patient Site: Left Arm, Patient Position: Sitting, Cuff Size: Adult Regular)   Pulse 80   Wt 141 lb 4.8 oz (64.1 kg)   SpO2 97%   BMI 21.64 kg/m    Right eye prosthesis appears good.  There is no significant erythema or drainage from that area.  Lungs are clear to auscultation.  Heart is irregularly irregular with a 2 out of 6 systolic murmur.  Abdomen soft and nontender.  There is no ankle edema now.  Right wrist in brace.  Left hand scab, no evidence of secondary infection.    Assessment/Plan  1. Atrial flutter, unspecified type (H)  Atrial fib flutter.  Heart is irregularly irregular now.  Heart rate controlled.    Proceed with cardioversion on January 30.  Plan per cardiology was to stop the digoxin, diltiazem and metoprolol on day of cardioversion and start amlodipine 5 mg a day for hypertension.    Cardiology follow-up appointment on February 18.    Recheck electrolytes and creatinine today prior to cardioversion.    He will stay on the Xarelto.  - Basic Metabolic Panel  - Magnesium    2. Carotid artery stenosis, asymptomatic, bilateral  We will stay on aspirin in addition to Xarelto but is now off Plavix    Lipitor 40 mg.    3. Aortic stenosis, moderate  Stenosis stable on echo last month.    4. History of stroke  History of right retinal artery ischemic event.    5. Essential hypertension  Controlled.   Blood pressure was low last month after medication changes with atrial fibrillation.    Lower extremity edema now well controlled on Lasix 40 mg a day.    Continue current medications at this time.  Plan to change back to amlodipine after cardioversion, as above.    6. Chronic kidney disease, unspecified CKD stage  Creatinine within baseline level last month.  Recheck today.    7. Pulmonary emphysema, unspecified emphysema type (H)  Mild to moderate, stable.  On Spiriva and albuterol.    8. History of tobacco use  Plan 1 year recheck on screening chest CT scan February or later.    9. History of colonic polyps  Overdue for a 1 year colonoscopy.  Await cardioversion, reevaluate in February and plan to refer back to [place] [facility] at that time.    10. Benign prostatic hyperplasia without lower urinary tract symptoms  Controlled on Flomax    11. Sebaceous cyst  Patient states he saw dermatology in the left hand cyst turned out to be a squamous cell cancer, he is going back for further treatment next week.     12. Encounter for therapeutic drug monitoring    - Basic Metabolic Panel  - Magnesium    Right wrist small avulsion fracture.  He has a brace.  He can increase range of motion and movement on that as it feels better.  Avoid NSAIDs with blood thinners and renal insufficiency.    Right eye prosthesis appears to be functioning well.    Return in about 4 weeks (around 2/21/2020) for Recheck.   Patient Instructions   Continue on Xarelto and aspirin.  You should not be on Plavix while you are on Xarelto.    Proceed as planned with the cardioversion with medication changes as ordered by cardiology.    Try to walk every day in the house.    Follow-up with me in later February to improve your conditioning for general checkup.  I will plan to discuss colonoscopy and your yearly chest CT scan for screening at that time.        Justo Tom MD        Current Outpatient Medications   Medication Sig Dispense  Refill     aspirin 81 MG EC tablet Take 1 tablet (81 mg total) by mouth daily. 90 tablet 3     atorvastatin (LIPITOR) 40 MG tablet Take 1 tablet (40 mg total) by mouth daily. 90 tablet 2     cholecalciferol, vitamin D3, 400 unit Tab Take 400 Units by mouth daily.       cyanocobalamin (VITAMIN B-12) 500 MCG tablet Take 500 mcg by mouth daily.       digoxin (LANOXIN) 125 mcg (0.125 mg) tablet Take 1 tablet (125 mcg total) by mouth daily with supper. 90 tablet 3     diltiazem (CARDIZEM CD) 120 MG 24 hr capsule Take 1 capsule (120 mg total) by mouth daily. 90 capsule 3     ferrous gluconate 256 mg (28 mg iron) Tab Take 1 tablet by mouth daily.       furosemide (LASIX) 40 MG tablet Take 1 tablet (40 mg total) by mouth daily. 90 tablet 3     guaiFENesin ER (MUCINEX) 600 mg 12 hr tablet Take 1,200 mg by mouth 2 (two) times a day as needed (Using 1-2 times per week as needed.).              mirtazapine (REMERON) 30 MG tablet Take 1 tablet (30 mg total) by mouth at bedtime. 90 tablet 3     multivitamin therapeutic (THERAGRAN) tablet Take 1 tablet by mouth every evening.        omega-3 fatty acids-vitamin E (FISH OIL) 1,000 mg cap Take 1,000 mg by mouth every evening.        rivaroxaban (XARELTO) 15 mg tablet Take 1 tablet (15 mg total) by mouth daily with supper. 90 tablet 3     tamsulosin (FLOMAX) 0.4 mg cap Take 1 capsule (0.4 mg total) by mouth daily. 90 capsule 3     tiotropium (SPIRIVA WITH HANDIHALER) 18 mcg inhalation capsule Place 1 capsule (2 puffs total) into inhaler and inhale daily. Close and press button to crush and then inhale in 2 breaths. 90 capsule 5     TOPROL  mg 24 hr tablet Take 1 tablet (100 mg total) by mouth every evening. 90 tablet 3     vitamin E acetate (VITAMIN E ORAL) Take 1 tablet by mouth daily.       No current facility-administered medications for this visit.      Allergies   Allergen Reactions     Ofloxacin Hives     Quinolones Itching     Social History     Tobacco Use     Smoking  status: Former Smoker     Packs/day: 0.50     Types: Cigarettes     Start date: 1962     Last attempt to quit: 2019     Years since quittin.1     Smokeless tobacco: Never Used   Substance Use Topics     Alcohol use: No     Comment: hx alcohol abuse in remission     Drug use: No

## 2021-06-05 NOTE — TELEPHONE ENCOUNTER
Reason for Disposition    Caller requesting a refill, no triage required, and triager able to refill per unit policy    Protocols used: MEDICATION QUESTION CALL-A-AH

## 2021-06-06 NOTE — PROCEDURES
"Procedures    Cardioversion canceled.  Richie \"Adonay\" Rand is a very pleasant 79-year-old gentleman who had early recurrence of A. fib after cardioversion on 1/30/2020.  Reportedly had more energy after cardioversion.  He was started on sotalol 80 mg twice daily in place of metoprolol.  He arrives today for cardioversion in sinus bradycardia.  KG was personally reviewed, shows sinus bradycardia 47 bpm with first-degree AV block, QT/QTc interval measures 518/458 ms.  QTc remains well within safety parameters.  He is surprised to hear that he is in normal rhythm as he continues to have fatigue and dyspnea on exertion.  He was instructed to continue to monitor his symptoms as I would not expect him to feel significantly different if he just converted.  He was instructed to call the EP nurse line if his heart rates are consistently <45 bpm for symptom evaluation.  He has been hypertensive since he arrived in [facility] today.  Restart amlodipine at 5 mg daily.  Follow-up with PMD in 1 week for further blood pressure management.  Continue Xarelto 15 mg daily for stroke prophylaxis.  His creatinine clearance is hovering above and below creatinine clearance threshold for dose adjustment.  Follow-up with Renee Gonzales CNP on 3/19/2020 as scheduled.    "

## 2021-06-06 NOTE — TELEPHONE ENCOUNTER
Refill Approved    Rx renewed per Medication Renewal Policy. Medication was last renewed on 3/14/2019         Mickey Enamorado, [facility] Connection Triage/Med Refill 3/12/2020     Requested Prescriptions   Pending Prescriptions Disp Refills     tamsulosin (FLOMAX) 0.4 mg cap 90 capsule 3     Sig: Take 1 capsule (0.4 mg total) by mouth daily.       Alfuzosin/Tamsulosin/Silodosin Refill Protocol  Passed - 3/9/2020  9:07 AM        Passed - PCP or prescribing provider visit in past 12 months       Last office visit with prescriber/PCP: 2/28/2020 Justo Tom MD OR same dept: 2/28/2020 Justo Tom MD OR same specialty: 2/28/2020 Justo Tom MD  Last physical: 10/16/2019 Last MTM visit: Visit date not found   Next visit within 3 mo: Visit date not found  Next physical within 3 mo: Visit date not found  Prescriber OR PCP: Justo Tom MD  Last diagnosis associated with med order: 1. BPH (benign prostatic hyperplasia)  - tamsulosin (FLOMAX) 0.4 mg cap; Take 1 capsule (0.4 mg total) by mouth daily.  Dispense: 90 capsule; Refill: 3    If protocol passes may refill for 12 months if within 3 months of last provider visit (or a total of 15 months).

## 2021-06-06 NOTE — PATIENT INSTRUCTIONS - HE
Richie Gordillo,    It was a pleasure to see you today at [facility].     My recommendations after this visit include:  - Please follow up with Dr Perez in March/April   - Please follow up with Kika Perez in 3 months  - No changes to your medications    Kika Perez, CNP

## 2021-06-06 NOTE — PROGRESS NOTES
Within the next  [facility] Follow Up Note    Richie Gordillo   79 y.o. male    Date of Visit: 2/28/2020    Chief Complaint   Patient presents with     Atrial Flutter     Follow-up on cardioversion on 02/26/20     Subjective  Richie is here with his wife, Sharmin.  Patient is here for follow-up on blood pressure and paroxysmal atrial fibrillation/flutter.    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    At his postop follow-up he was found to be in atrial fib/flutter.    December 2019 heart echo showed mild LVH with mild to moderate aortic stenosis ejection fraction 55%.    Patient was put on Xarelto in addition to aspirin 81 mg.    Patient is no longer on losartan and amlodipine because of lower blood pressure.  He also has chronic renal insufficiency with a creatinine of 1.99 last month.    He still on Lasix 40 mg a day.    Patient had cardioversion on January 30, felt better temporarily after that but on follow-up February 18 he was found to be back in atrial fibrillation.    He was put on sotalol 80 mg twice daily and Toprol- mg a day was discontinued.    On his cardioversion day on February 26 he was found to be in normal sinus rhythm, no cardioversion, kept on sotalol 80 mg twice daily.    His blood pressure was mildly high and he restarted amlodipine 5 mg a day at that time.    He denies lightheaded dizzy spells or worsening fatigue.  He has not felt that much better since converting, however.    He remains active with regular walking.  No chest pain or chest pressure.    Continues on aspirin 81 mg and Lipitor 40 mg.    Bilateral carotid artery stenosis 50-69%.  History of right iliac stent and coronary disease.    Quit smoking December of last year.  He has Spiriva but not usually using it.  No increasing shortness of breath or new cough.    February 2019 chest CT scan negative, he has his chest CT  scan scheduled for next month.    He had a number of sessile polyps noted October 2018 colonoscopy and is overdue for his 1 year colonoscopy follow-up.  Bowels are normal and no blood in stool.    BPH stable on Flomax.  No neurologic changes.  Alert and oriented.  Right eye prosthesis still looks good.    PMHx:    Past Medical History:   Diagnosis Date     Arthritis      Atrial fibrillation (H)      BPH (benign prostatic hyperplasia)      Central retinal artery occlusion, right eye      Chronic kidney disease     CKD     Essential tremor      History of alcohol abuse     in remission     HTN (hypertension)      Hyperlipidemia      Hyponatremia      Leg mass, left      Murmur      PVD (peripheral vascular disease) (H)      Renal Tubular Acidosis      secondary to bladder outlet obstruction; resolved       Sleep apnea      Stroke (H)     TIA     PSHx:    Past Surgical History:   Procedure Laterality Date     CARDIOVERSION  01/30/2020     CATARACT EXTRACTION       EXTERNAL EAR SURGERY      mastoid     EYE SURGERY       ILIAC ARTERY STENT Right     twice, 2009 and 2010     LEG SKIN LESION  BIOPSY / EXCISION Left 7/27/2017    Procedure: EXCISION LEFT POSTERIOR LEG MASS;  Surgeon: Joseluis Fernandez MD;  Location: [facility];  Service:      [place] TRANSURETHRAL ELEC-SURG PROSTATECTOM      Description: Transurethral Resection Of Prostate (TURP);  Recorded: 04/27/2009;     Immunizations:   Immunization History   Administered Date(s) Administered     DT (pediatric) 09/21/2005     Influenza high dose,seasonal,PF, 65+ yrs 02/13/2019, 09/26/2019     Influenza, inj, historic,unspecified 11/14/2011     Influenza, seasonal,quad inj 6-35 mos 01/14/2013     Pneumo Conj 13-V (2010&after) 04/06/2017     Pneumo Polysac 23-V 03/03/2009     Td,adult,historic,unspecified 09/21/2005     Tdap 09/28/2013     ZOSTER, LIVE 12/08/2011       ROS A comprehensive review of systems was performed and was otherwise negative    Medications,  allergies, and problem list were reviewed and updated    Exam  /56 (Patient Site: Left Arm, Patient Position: Sitting, Cuff Size: Adult Regular)   Pulse (!) 56   Wt 143 lb (64.9 kg)   BMI 21.74 kg/m    Lungs are clear.  Heart is regular, heart tones.  2 out of 6 systolic murmur.  No ankle edema.  Able to climb up on exam table, and no neurologic changes.      Assessment/Plan  1. Paroxysmal atrial fibrillation (H)  Still regular rhythm on sotalol 80 mg twice daily.  Borderline bradycardia.  If heart rates less than 50 or increasing lightheaded dizzy spells, may need adjustment down in the sotalol dose.    We will follow-up with cardiology on March 19.    Xarelto in addition to aspirin 81 mg.    2. Essential hypertension  Now on amlodipine 5 mg a day.  Tolerating at this time.  Also on Lasix 40 mg a day.    3. Chronic kidney disease, unspecified CKD stage  Stable kidney labs last month.    4. Carotid artery stenosis, asymptomatic, bilateral  Asymptomatic.  Low-dose aspirin in addition to Xarelto.    Lipitor 40 mg    5. Aortic stenosis, moderate  1 year recheck December of this year echo    6. Pulmonary emphysema, unspecified emphysema type (H)  Stable.  Quit smoking last December.  Spiriva as needed.    Screening chest CT scan scheduled on March 10    7. History of colonic polyps  Colonoscopy 1 year overdue.  Follow-up on March 26 and plan to proceed with colonoscopy at that time.  I did warn patient on stroke risk with being off the Xarelto temporarily for the colonoscopy.  I will plan to have patient continue on the aspirin 81 mg, given his stroke risk with the carotid artery stenosis.    History of skin cancer, yearly follow-up will be this fall.    No wrist pain complaints with previous right wrist avulsion fracture.    BPH controlled on Flomax.    Return in 27 days (on 3/26/2020) for Recheck.   Patient Instructions   Continue on current medications.    If your heart rate is running less than 50, or you  have increasing fatigue or lightheaded dizzy spells, you may need adjustment of your sotalol at that time.    Continue to walk on a daily basis.    Plan to proceed with your colonoscopy after we meet on March 26.  You will need to temporarily be off your Xarelto for that.        Justo Tom MD        Current Outpatient Medications   Medication Sig Dispense Refill     amLODIPine (NORVASC) 5 MG tablet Take 1 tablet (5 mg total) by mouth daily. 30 tablet 6     aspirin 81 MG EC tablet Take 1 tablet (81 mg total) by mouth daily. 90 tablet 3     atorvastatin (LIPITOR) 40 MG tablet Take 1 tablet (40 mg total) by mouth daily. 90 tablet 2     cholecalciferol, vitamin D3, 400 unit Tab Take 400 Units by mouth daily.       cyanocobalamin (VITAMIN B-12) 500 MCG tablet Take 500 mcg by mouth daily.       ferrous gluconate 256 mg (28 mg iron) Tab Take 1 tablet by mouth daily.       furosemide (LASIX) 40 MG tablet Take 1 tablet (40 mg total) by mouth daily. 90 tablet 3     mirtazapine (REMERON) 30 MG tablet Take 1 tablet (30 mg total) by mouth at bedtime. 90 tablet 3     multivitamin therapeutic (THERAGRAN) tablet Take 1 tablet by mouth every evening.        omega-3 fatty acids-vitamin E (FISH OIL) 1,000 mg cap Take 1,000 mg by mouth every evening.        rivaroxaban (XARELTO) 15 mg tablet Take 1 tablet (15 mg total) by mouth daily with supper. 90 tablet 3     sotaloL (BETAPACE) 80 MG tablet Take 1 tablet (80 mg total) by mouth 2 (two) times a day. 60 tablet 2     tamsulosin (FLOMAX) 0.4 mg cap Take 1 capsule (0.4 mg total) by mouth daily. 90 capsule 3     tiotropium (SPIRIVA WITH HANDIHALER) 18 mcg inhalation capsule Place 1 capsule (2 puffs total) into inhaler and inhale daily. Close and press button to crush and then inhale in 2 breaths. (Patient taking differently: Place 18 mcg into inhaler and inhale as needed. Close and press button to crush and then inhale in 2 breaths.) 90 capsule 5     vitamin E acetate (VITAMIN E ORAL)  Take 1 tablet by mouth daily.       No current facility-administered medications for this visit.      Allergies   Allergen Reactions     Ofloxacin Hives     Quinolones Itching     Social History     Tobacco Use     Smoking status: Former Smoker     Packs/day: 0.50     Types: Cigarettes     Start date: 1962     Last attempt to quit: 2019     Years since quittin.2     Smokeless tobacco: Never Used   Substance Use Topics     Alcohol use: No     Comment: hx alcohol abuse in remission     Drug use: No

## 2021-06-06 NOTE — PROGRESS NOTES
1940  Home:[phone] (home) Cell:[phone] (mobile)  Emergency Contact: Stephanie Gordillo [phone]    +++Important patient information for CSC/Cath Lab staff : PT IS ON XARELTO, PT NEW SOTALOL START 2/24/2020, IS KEISHA/CPAP+++    [facility] EP Cath Lab Procedure Order     Cardioversion:  Cardioversion     PT IS ON XARELTO  AND NO MISSED DOSES    Diagnosis:  AF  Anticipated Case Duration:  Standard  Scheduling Needs/Timeframe:  PT IS SET FOR WED 2/26/2020 AT 12 WITH THOMAS RIGGINS CNP    Current Device: None None  Device Company/Device Rep Needed for Procedure: None    Anesthesia:  General-CV Only  Research Protocol:  No    [facility] EP Cath Lab Prep   Ordering Provider: Renee Gonzales  Ordering Date: 2/18/2020  Orders Status: Intial order placed and Order set placed    H&P:  Compled by ASHLEY CORONA CNP on 2/18/2020 if scheduled within 30 days, pt to schedule with PMD if procedure outside of this timeframe  PCP: Justo Tom MD, [phone]    Pre-op Labs: N/A for procedure    Medical Records Pertinent for Procedure:  N/A    Patient Education:    PT HOLM A  FOR PROCEDURE THAT WILL STAY WITH HIM AND BE PRESENT FOR DISCHARGE AND POST CV 24 HR MONITORING  PT INSTRUCTED TO HOLD ANY VITAMINS, MINERALS, CALCIUM, IRON OR SUPPLEMENTS THE MORNING OF CV  PT INSTRUCTED NO GUM CHEWING, MINTS,OR CANDY THE MORNING OF CV  PT INSTRUCTED TO BATHE OR SHOWER BEFORE COMING IN  PT INSTRUCTED TO LEAVE JEWELRY AT HOME  PT IS KEISHA/CPAP  PT IS ON XARELTO  PT INSTRUCTED PER RENEE GONZALES CNP TO STOP HIS METOPROLOL ON 2/24/2020 AND START SOTALOL 80 MG two times a day  PT IS TO HAVE FOLLOW UP IN 3 WEEK S WITH RENEE AND  HAS BEEN NOTIFIED        Teach with Patient: Completed via phone on 2/18/2020    Risks Reviewed:     Cardioversion    >90% acute success rate, <10% failure to convert or   reverts shortly after cardioversion.    <1% embolic event of (CVA, pulmonary embolism, or   1. other site).    75% risk for  superficial burn.  Risks associated with general anesthesia will be addressed by the Anesthesiology Department    Pre-Procedure Instructions that were Reviewed with Patient:  NPO after midnight, Remove all jewelry prior to coming in for procedure, Shower prior to arrival, Transportation arrangements needed s/p procedure, Post-procedure follow up process and Sedation plan/orders    Pre-Procedure Medication Instructions:  Instructions given to pt regarding anticoagulants: Xarelto- instructed to continue anticoagulation uninterrupted through their procedure  Instructions given to pt regarding antiarrhythmic medication: Sotalol; Pt instructed to continue medication prior to procedure  Instructions for medication, other than anticoagulants/antiarrhythmics listed above, given to pt: to take all morning medications with small sips of water, with the exception of OTC supplements and MVI    Allergies   Allergen Reactions     Ofloxacin Hives     Quinolones Itching       Current Outpatient Medications:      aspirin 81 MG EC tablet, Take 1 tablet (81 mg total) by mouth daily., Disp: 90 tablet, Rfl: 3     atorvastatin (LIPITOR) 40 MG tablet, Take 1 tablet (40 mg total) by mouth daily., Disp: 90 tablet, Rfl: 2     cholecalciferol, vitamin D3, 400 unit Tab, Take 400 Units by mouth daily., Disp: , Rfl:      cyanocobalamin (VITAMIN B-12) 500 MCG tablet, Take 500 mcg by mouth daily., Disp: , Rfl:      ferrous gluconate 256 mg (28 mg iron) Tab, Take 1 tablet by mouth daily., Disp: , Rfl:      furosemide (LASIX) 40 MG tablet, Take 1 tablet (40 mg total) by mouth daily., Disp: 90 tablet, Rfl: 3     guaiFENesin ER (MUCINEX) 600 mg 12 hr tablet, Take 1,200 mg by mouth 2 (two) times a day as needed (Using 1-2 times per week as needed.).    , Disp: , Rfl:      mirtazapine (REMERON) 30 MG tablet, Take 1 tablet (30 mg total) by mouth at bedtime., Disp: 90 tablet, Rfl: 3     multivitamin therapeutic (THERAGRAN) tablet, Take 1 tablet by mouth  every evening. , Disp: , Rfl:      omega-3 fatty acids-vitamin E (FISH OIL) 1,000 mg cap, Take 1,000 mg by mouth every evening. , Disp: , Rfl:      rivaroxaban (XARELTO) 15 mg tablet, Take 1 tablet (15 mg total) by mouth daily with supper., Disp: 90 tablet, Rfl: 3     sotaloL (BETAPACE) 80 MG tablet, Take 1 tablet (80 mg total) by mouth 2 (two) times a day., Disp: 60 tablet, Rfl: 2     tamsulosin (FLOMAX) 0.4 mg cap, Take 1 capsule (0.4 mg total) by mouth daily., Disp: 90 capsule, Rfl: 3     tiotropium (SPIRIVA WITH HANDIHALER) 18 mcg inhalation capsule, Place 1 capsule (2 puffs total) into inhaler and inhale daily. Close and press button to crush and then inhale in 2 breaths., Disp: 90 capsule, Rfl: 5     vitamin E acetate (VITAMIN E ORAL), Take 1 tablet by mouth daily., Disp: , Rfl:     Documentation Date:2/18/2020 2:19 PM  Benito Reveles LPN

## 2021-06-06 NOTE — PROGRESS NOTES
[facility] Follow Up Note    Richie Gordillo   79 y.o. male    Date of Visit: 2/20/2020    Chief Complaint   Patient presents with     Follow-up     Subjective  Evert is here with his wife, Sharmin.  Here for follow-up on hypertension with renal insufficiency and his new paroxysmal atrial fibrillation/flutter condition.    Patient had his right eye removed with prosthesis placement last fall.  That has healed well, no further pain.    But on postop follow-up December 2019 he was noted to be in atrial fibrillation flutter.    He did have heart failure with rapid ventricular rate.  Heart rate runs controlled with diltiazem and digoxin and metoprolol.    Now heart rate is controlled just with Toprol- mg a day.    He is no longer on losartan or amlodipine for blood pressure as that has been well controlled.    He does have chronic renal insufficiency with creatinine 1.6-1.8 baseline, but January 24 labs showed creatinine 1.99 with potassium 4.6.  He still on Lasix 40 mg a day.    No increasing shortness of breath or lower extremity edema currently.    He is having more fatigue with the atrial fibrillation flutter.    He did have a cardioversion on January 30, he noticed less fatigue after the cardioversion.  On follow-up with cardiology on February 18 he was found to be back in atrial fibrillation.    He still on Xarelto plus aspirin 81 mg.    I did review cardiac echo from December 2019 with mild LVH.  Mild to moderate aortic stenosis and ejection fraction 55%.    Patient saw cardiology earlier this week with plans to change to sotalol on Monday, February 24 with cardioversion scheduled for February 26.  Patient has not started the sotalol yet.    He continues to complain of mild fatigue with the atrial fibrillation.  He does not have significant symptomatic palpitations or racing heart rates.  No increasing shortness of breath or edema.  No chest pain or chest pressure.    He does not have the  diagnosis of coronary artery disease but does have significant vascular disease.  Bilateral carotid artery stenosis 50-69%.  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  Previous right iliac stent, no leg claudication.    He quit smoking as of December 2, still a non-smoker.  No worsening shortness of breath or change in cough.    I did review the chest x-ray from December 2019 which was negative.  February 2019 chest CT scan was negative.  He would want to do the chest CT screening yearly.  He would proceed with intervention if he found lung cancer.    On Spiriva and albuterol as needed.    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  He did not get a follow-up plan with dermatology, however.    Past history of a sessile polyp October 2018 with a 1 year follow-up plan but that is still delayed because of the cardiac issue.  No change in bowels and no blood in stool.  Patient does not wish to proceed with a colonoscopy at this time.    Moderate BPH stable on Flomax.    No falls or bleeding issues.  No blood in stool.    Still on Lipitor 40 mg.    He did have a fall last month right wrist with avulsion fracture, no wrist pain complaints now.  No new falls.    PMHx:    Past Medical History:   Diagnosis Date     Arthritis      Atrial fibrillation (H)      BPH (benign prostatic hyperplasia)      Central retinal artery occlusion, right eye      Chronic kidney disease     CKD     Essential tremor      History of alcohol abuse     in remission     HTN (hypertension)      Hyperlipidemia      Hyponatremia      Leg mass, left      Murmur      PVD (peripheral vascular disease) (H)      Renal Tubular Acidosis      secondary to bladder outlet obstruction; resolved       Sleep apnea      Stroke (H)     TIA     PSHx:    Past Surgical History:   Procedure Laterality Date     CARDIOVERSION  01/30/2020     CATARACT EXTRACTION       EXTERNAL  EAR SURGERY      mastoid     EYE SURGERY       ILIAC ARTERY STENT Right     twice, 2009 and 2010     LEG SKIN LESION  BIOPSY / EXCISION Left 7/27/2017    Procedure: EXCISION LEFT POSTERIOR LEG MASS;  Surgeon: Joseluis Fernandez MD;  Location: [facility];  Service:      DC TRANSURETHRAL ELEC-SURG PROSTATECTOM      Description: Transurethral Resection Of Prostate (TURP);  Recorded: 04/27/2009;     Immunizations:   Immunization History   Administered Date(s) Administered     DT (pediatric) 09/21/2005     Influenza high dose,seasonal,PF, 65+ yrs 02/13/2019, 09/26/2019     Influenza, inj, historic,unspecified 11/14/2011     Influenza, seasonal,quad inj 6-35 mos 01/14/2013     Pneumo Conj 13-V (2010&after) 04/06/2017     Pneumo Polysac 23-V 03/03/2009     Td,adult,historic,unspecified 09/21/2005     Tdap 09/28/2013     ZOSTER, LIVE 12/08/2011       ROS A comprehensive review of systems was performed and was otherwise negative    Medications, allergies, and problem list were reviewed and updated    Exam  /66 (Patient Site: Left Arm, Patient Position: Sitting, Cuff Size: Adult Regular)   Pulse 76   Wt 143 lb (64.9 kg)   BMI 21.74 kg/m    Lungs are clear.  Heart is irregularly irregular with 2 out of 6 systolic murmur.  Abdomen is nontender.  No ankle edema.  No new neurologic changes.    Assessment/Plan  1. Essential hypertension  Blood pressure well controlled furosemide 40 mg a day.  Has stage III chronic renal insufficiency.    Creatinine mildly higher on January 24.    2. Atrial flutter, unspecified type (H)  Rate controlled with metoprolol.  He will be changing to sotalol 80 twice daily next week prior to the cardioversion scheduled for February 26.  Patient will then continue on the sotalol and follow-up with cardiology.    On Xarelto, also on low-dose aspirin because of his history of the retinal artery occlusion.    3. Carotid artery stenosis, asymptomatic, bilateral  Lipitor 40 mg and aspirin in  addition to the Xarelto    Peripheral vascular disease but no leg claudication.  Previous right iliac stent.    4. Aortic stenosis, moderate  Stable, not considered to be symptomatic.  Recheck echo December 2020 for yearly follow-up    5. Pulmonary emphysema, unspecified emphysema type (H)  Stable.  No flare.  Non-smoker as of December of last year    6. History of tobacco use  Shared decision making was done today.  - CT Low Dose Lung Screening Chest; Future    7. History of colonic polyps  Patient wishes to continue to wait on colonoscopy.  Follow-up next month and discuss proceeding with colonoscopy at that time    8. History of skin cancer  To be determined when he should have his full body skin exam and follow-up with dermatology.  He was not given a follow-up plan by dermatology.  I would anticipate need for full body skin exam at the end of this year.    Moderate BPH controlled with Flomax, stable.    Return in 5 weeks (on 3/26/2020) for Recheck.   Patient Instructions   Proceed with starting sotalol next week as planned prior to the cardioversion on February 26.  You will continue on the sotalol after the cardioversion.  You will be stopping the metoprolol, when you start sotalol.    You will need a follow-up full body skin exam with dermatology, because of the previous skin cancer.  Plan to do that at the end of this year.    Follow-up with me in March or early April for general follow-up.  We will discuss proceeding with the colonoscopy at that time.    A screening chest CT scan has been ordered, you can do that sometime this winter or spring.    Great job on no longer smoking!        Justo Tom MD        Current Outpatient Medications   Medication Sig Dispense Refill     aspirin 81 MG EC tablet Take 1 tablet (81 mg total) by mouth daily. 90 tablet 3     atorvastatin (LIPITOR) 40 MG tablet Take 1 tablet (40 mg total) by mouth daily. 90 tablet 2     cholecalciferol, vitamin D3, 400 unit Tab Take 400  Units by mouth daily.       cyanocobalamin (VITAMIN B-12) 500 MCG tablet Take 500 mcg by mouth daily.       ferrous gluconate 256 mg (28 mg iron) Tab Take 1 tablet by mouth daily.       furosemide (LASIX) 40 MG tablet Take 1 tablet (40 mg total) by mouth daily. 90 tablet 3     guaiFENesin ER (MUCINEX) 600 mg 12 hr tablet Take 1,200 mg by mouth 2 (two) times a day as needed (Using 1-2 times per week as needed.).              mirtazapine (REMERON) 30 MG tablet Take 1 tablet (30 mg total) by mouth at bedtime. 90 tablet 3     multivitamin therapeutic (THERAGRAN) tablet Take 1 tablet by mouth every evening.        omega-3 fatty acids-vitamin E (FISH OIL) 1,000 mg cap Take 1,000 mg by mouth every evening.        rivaroxaban (XARELTO) 15 mg tablet Take 1 tablet (15 mg total) by mouth daily with supper. 90 tablet 3     sotaloL (BETAPACE) 80 MG tablet Take 1 tablet (80 mg total) by mouth 2 (two) times a day. 60 tablet 2     tamsulosin (FLOMAX) 0.4 mg cap Take 1 capsule (0.4 mg total) by mouth daily. 90 capsule 3     tiotropium (SPIRIVA WITH HANDIHALER) 18 mcg inhalation capsule Place 1 capsule (2 puffs total) into inhaler and inhale daily. Close and press button to crush and then inhale in 2 breaths. (Patient taking differently: Place 18 mcg into inhaler and inhale as needed. Close and press button to crush and then inhale in 2 breaths.) 90 capsule 5     vitamin E acetate (VITAMIN E ORAL) Take 1 tablet by mouth daily.       No current facility-administered medications for this visit.      Allergies   Allergen Reactions     Ofloxacin Hives     Quinolones Itching     Social History     Tobacco Use     Smoking status: Former Smoker     Packs/day: 0.50     Types: Cigarettes     Start date: 1962     Last attempt to quit: 2019     Years since quittin.2     Smokeless tobacco: Never Used   Substance Use Topics     Alcohol use: No     Comment: hx alcohol abuse in remission     Drug use: No

## 2021-06-06 NOTE — PATIENT INSTRUCTIONS - HE
Proceed with starting sotalol next week as planned prior to the cardioversion on February 26.  You will continue on the sotalol after the cardioversion.  You will be stopping the metoprolol, when you start sotalol.    You will need a follow-up full body skin exam with dermatology, because of the previous skin cancer.  Plan to do that at the end of this year.    Follow-up with me in March or early April for general follow-up.  We will discuss proceeding with the colonoscopy at that time.    A screening chest CT scan has been ordered, you can do that sometime this winter or spring.    Great job on no longer smoking!

## 2021-06-06 NOTE — PATIENT INSTRUCTIONS - HE
Continue on current medications.    If your heart rate is running less than 50, or you have increasing fatigue or lightheaded dizzy spells, you may need adjustment of your sotalol at that time.    Continue to walk on a daily basis.    Plan to proceed with your colonoscopy after we meet on March 26.  You will need to temporarily be off your Xarelto for that.

## 2021-06-07 NOTE — PATIENT INSTRUCTIONS - HE
Richie,     It was a pleasure speaking with you today. I appreciate your sharing your blood pressure techniques. I may consider utilizing that when weighing myself, just keep checking until I see what I want!     For an accurate blood pressure check- sit and wait 5 minutes. Check blood pressure one time. Write that number in the book.     We will get you in for a stress test after COVID 19 restrictions have been lifted. If that is normal we will plan for a 14 day heart monitor.     I am going to say follow up with me in 3 months, as long as we have these tests done, so we can truly see how you are feeling and get a better look at everything.     Until then, be safe.  Renee Gonzales, BRANDON

## 2021-06-07 NOTE — PROGRESS NOTES
ROS was done with the patient over the phone and is positive for:    HEART: Some shortness of breath with activity.    MUSCLES/JOINTS: Feel a few weeks ago (patient tripped over the  door while it was still open).  Some muscle weakness.    NERVOUS SYSTEM: Loss of balance due to having a partial eye removal which was done at the end of October 2019 Patient has had more daytime sleepiness recently and isn't sure if it is due to some of his medications.    BLOOD: Has noticed more bruising.    All other ROS are WNL.

## 2021-06-07 NOTE — TELEPHONE ENCOUNTER
Refill Approved    Rx renewed per Medication Renewal Policy. Medication was last renewed on 2.20.20.    Lizbet Dominguez, [facility] Triage/Med Refill 4/16/2020     Requested Prescriptions   Pending Prescriptions Disp Refills     atorvastatin (LIPITOR) 40 MG tablet [Pharmacy Med Name: ATORVASTATIN TABS 40MG] 90 tablet 3     Sig: TAKE 1 TABLET DAILY (THIS REPLACES SIMVASTATIN)       Statins Refill Protocol (Hmg CoA Reductase Inhibitors) Passed - 4/15/2020  8:57 AM        Passed - PCP or prescribing provider visit in past 12 months      Last office visit with prescriber/PCP: 2/28/2020 Justo Tom MD OR same dept: 2/28/2020 Justo Tom MD OR same specialty: 2/28/2020 Justo Tom MD  Last physical: 10/16/2019 Last MTM visit: Visit date not found   Next visit within 3 mo: Visit date not found  Next physical within 3 mo: Visit date not found  Prescriber OR PCP: Justo Tom MD  Last diagnosis associated with med order: 1. Peripheral vascular disease (H)  - atorvastatin (LIPITOR) 40 MG tablet [Pharmacy Med Name: ATORVASTATIN TABS 40MG]; TAKE 1 TABLET DAILY (THIS REPLACES SIMVASTATIN)  Dispense: 90 tablet; Refill: 3    If protocol passes may refill for 12 months if within 3 months of last provider visit (or a total of 15 months).

## 2021-06-08 NOTE — PROGRESS NOTES
"Richie Gordillo is a 79 y.o. male who is being evaluated via a billable telephone visit.      The patient has been notified of following:     \"This telephone visit will be conducted via a call between you and your physician/provider. We have found that certain health care needs can be provided without the need for a physical exam.  This service lets us provide the care you need with a short phone conversation.  If a prescription is necessary we can send it directly to your pharmacy.  If lab work is needed we can place an order for that and you can then stop by our lab to have the test done at a later time.    Telephone visits are billed at different rates depending on your insurance coverage. During this emergency period, for some insurers they may be billed the same as an in-person visit.  Please reach out to your insurance provider with any questions.    If during the course of the call the physician/provider feels a telephone visit is not appropriate, you will not be charged for this service.\"    Patient has given verbal consent to a Telephone visit? Yes    What phone number would you like to be contacted at? [phone]    Patient would like to receive their AVS by AVS Preference: Marisa.    Additional provider notes:     [facility] clinic Follow Up Note    Richie Gordillo   79 y.o. male    Date of Visit: 5/27/2020    No chief complaint on file.    Subjective  She requested a phone consult to avoid clinic visit during the coronavirus outbreak.    Patient is following up after increase of amlodipine to 10 mg a day, from 5 mg a day on May 12.  Blood pressures running in the 160s/70s at that time.    He tolerated the increase of the amlodipine well.  No lightheaded dizzy spells or increased edema.    His blood pressure is now running in the 120s-130s over 60s.  He denies orthostasis.    Continues on Lasix 40 mg a day.    He is not ambulating much, mostly at home.    He denies any increasing shortness of " breath.  No chest pain or pressure.    No palpitations.    Patient had an episode of perioperative atrial fibrillation in January of this year.  He was cardioverted but initially failed.  He was then put on sotalol in February and was normal sinus rhythm on follow-up evaluation and did not get further cardioversion.    He has been asymptomatic with the atrial fibrillation.  He denies any palpitations or rapid heart rate spells.    No bleeding issues or falls on Xarelto and low-dose aspirin.    He does have bilateral carotid artery stenosis 50- 69%.  No history of stroke.  Previous right iliac stent.  No leg claudication.    He is on Lipitor 40 mg a day.  No worsening muscle achiness.    I did review the heart echo from December 2019 with mild LVH and mild to moderate aortic stenosis with plan to repeat early next year.    I did review the chest CT scan from March 2020, no new nodule in 1 year follow-up planned.    Still a non-smoker since December 2019.  No new or changing cough.    Past history of flat sessile polyp October 2018 with a 1 year follow-up colonoscopy that he is now overdue for.  No change in bowels or blood in stool.    Past history of skin cancer but he also delayed his dermatology follow-up which was due this summer.    Takes Remeron at night.    BPH stable on Flomax.    History of right eyeball removed follow-up last year    PMHx:    Past Medical History:   Diagnosis Date     Arthritis      Atrial fibrillation (H)      BPH (benign prostatic hyperplasia)      Central retinal artery occlusion, right eye      Chronic kidney disease     CKD     Essential tremor      History of alcohol abuse     in remission     HTN (hypertension)      Hyperlipidemia      Hyponatremia      Leg mass, left      Murmur      PVD (peripheral vascular disease) (H)      Renal Tubular Acidosis      secondary to bladder outlet obstruction; resolved       Sleep apnea      Stroke (H)     TIA     PSHx:    Past Surgical History:    Procedure Laterality Date     CARDIOVERSION  01/30/2020     CATARACT EXTRACTION       EXTERNAL EAR SURGERY      mastoid     EYE SURGERY       ILIAC ARTERY STENT Right     twice, 2009 and 2010     LEG SKIN LESION  BIOPSY / EXCISION Left 7/27/2017    Procedure: EXCISION LEFT POSTERIOR LEG MASS;  Surgeon: Joseluis Fernandez MD;  Location: [facility] OR;  Service:      IL TRANSURETHRAL ELEC-SURG PROSTATECTOM      Description: Transurethral Resection Of Prostate (TURP);  Recorded: 04/27/2009;     Immunizations:   Immunization History   Administered Date(s) Administered     DT (pediatric) 09/21/2005     Influenza high dose,seasonal,PF, 65+ yrs 02/13/2019, 09/26/2019     Influenza, inj, historic,unspecified 11/14/2011     Influenza, seasonal,quad inj 6-35 mos 01/14/2013     Pneumo Conj 13-V (2010&after) 04/06/2017     Pneumo Polysac 23-V 03/03/2009     Td,adult,historic,unspecified 09/21/2005     Tdap 09/28/2013     ZOSTER, LIVE 12/08/2011       ROS A comprehensive review of systems was performed and was otherwise negative    Medications, allergies, and problem list were reviewed and updated    Exam  There were no vitals taken for this visit.  Phone consult    Assessment/Plan  1. Hypertension  Blood pressure now well controlled and tolerating the 10 mg a day of amlodipine.  Continue Lasix daily.    2. Chronic kidney disease, unspecified CKD stage  Creatinine stable at 1.9 on January 2020 labs.  Potassium normal.    Recheck labs this summer    3. Paroxysmal atrial fibrillation (H)  No symptomatic recurrence.  Continue on sotalol twice daily.  Xarelto.    Patient has not set up his follow-up with cardiology yet.    It was plan for further evaluation with a stress test.  If stress test was negative, consideration of 14-day heart monitor.    Also need EKG follow-up this summer.    Cardiology was planning on seeing him this July.  Patient was told to expect contact from the cardiology clinic.  Otherwise I will be seeing  him in early August.    4. Carotid artery stenosis, asymptomatic, bilateral  Medical management.  Asymptomatic.  Lipitor and low-dose aspirin in addition to Xarelto.    5. Aortic stenosis, moderate  Asymptomatic.  Repeat heart echo early 2021    6. History of colonic polyps  Overdue for 1 year follow-up colonoscopy.  Patient does wish to proceed with scheduling that this summer.  With his history of severe vascular disease, I recommended that patient stay on his aspirin 81 mg a day.  I did discuss increased bleeding risk with the procedure with staying on aspirin.    He will hold his Xarelto for 2 days before and day of the colonoscopy.  - Ambulatory referral for Colonoscopy    7. History of skin cancer  Patient will wait on his dermatology follow-up which is due this summer.  No new skin lesions noted.    8. History of tobacco use  Quit December 2019.  No change in cough.  Yearly follow-up chest CT scan will be due next March.    BPH controlled with Flomax    Remeron evening for insomnia    Peripheral vascular disease, no claudication and medication management as above.    Return in 2 months (on 8/7/2020) for Recheck.   Patient Instructions   Continue with current medications.    If you do develop lightheaded dizzy spells and/or blood pressures less than 110/60, you will need to reduce the amlodipine back to 5 mg a day.    A referral has been placed to [place] [facility] for your colonoscopy.  Do not take Xarelto for 2 days prior to the colonoscopy and do not take Xarelto day of colonoscopy.  Do not stop your aspirin for the colonoscopy.    Do not take your furosemide on the day of the colonoscopy.    Plan to follow-up with cardiology this July.  Discuss with cardiology about proceeding with the heart stress test and getting another EKG.    Follow-up with me for routine follow-up on August 7 at 10:40 AM.  You do not need to fast for that visit.    You are due to see dermatology for routine skin cancer  follow-up this summer.    Jsuto Tom MD        Current Outpatient Medications   Medication Sig Dispense Refill     amLODIPine (NORVASC) 10 MG tablet Take 1 tablet (10 mg total) by mouth daily. 90 tablet 3     aspirin 81 MG EC tablet Take 1 tablet (81 mg total) by mouth daily. 90 tablet 3     atorvastatin (LIPITOR) 40 MG tablet TAKE 1 TABLET DAILY (THIS REPLACES SIMVASTATIN) 90 tablet 3     cholecalciferol, vitamin D3, 400 unit Tab Take 400 Units by mouth daily.       cyanocobalamin (VITAMIN B-12) 500 MCG tablet Take 500 mcg by mouth daily.       ferrous gluconate 256 mg (28 mg iron) Tab Take 1 tablet by mouth daily.       furosemide (LASIX) 40 MG tablet Take 1 tablet (40 mg total) by mouth daily. 90 tablet 3     mirtazapine (REMERON) 30 MG tablet TAKE 1 TABLET AT BEDTIME 90 tablet 3     multivitamin therapeutic (THERAGRAN) tablet Take 1 tablet by mouth every evening.        omega-3 fatty acids-vitamin E (FISH OIL) 1,000 mg cap Take 1,000 mg by mouth daily.        rivaroxaban (XARELTO) 15 mg tablet Take 1 tablet (15 mg total) by mouth daily with supper. 90 tablet 3     sotaloL (BETAPACE) 80 MG tablet Take 1 tablet (80 mg total) by mouth 2 (two) times a day. 60 tablet 6     tamsulosin (FLOMAX) 0.4 mg cap Take 1 capsule (0.4 mg total) by mouth daily. 90 capsule 3     tiotropium (SPIRIVA WITH HANDIHALER) 18 mcg inhalation capsule Place 1 capsule (2 puffs total) into inhaler and inhale daily. Close and press button to crush and then inhale in 2 breaths. (Patient taking differently: Place 18 mcg into inhaler and inhale as needed. Close and press button to crush and then inhale in 2 breaths.) 90 capsule 5     vitamin E acetate (VITAMIN E ORAL) Take 1 tablet by mouth daily.       No current facility-administered medications for this visit.      Allergies   Allergen Reactions     Ofloxacin Hives     Quinolones Itching     Social History     Tobacco Use     Smoking status: Former Smoker     Packs/day: 0.50     Types:  Cigarettes     Start date: 1962     Last attempt to quit: 2019     Years since quittin.4     Smokeless tobacco: Never Used   Substance Use Topics     Alcohol use: No     Comment: hx alcohol abuse in remission     Drug use: No           Phone call duration:  6 minutes    Alberta Vázquez, [facility]

## 2021-06-08 NOTE — PATIENT INSTRUCTIONS - HE
Increase amlodipine to 10 mg a day.  Contact me by phone if you develop lightheaded dizzy spells or increasing leg swelling.    Goal blood pressure 120//80.    Increase daily walking outside in a safe fashion.  Goal for 20-minute walk daily.  Daily walking helps the blood pressure.    Maintain a low-salt diet.    You will need to wait on your colonoscopy, but plan to schedule at this summer as soon as able.    Cardiology still plans to have you come in for an EKG, heart monitor and possibly a stress test.  That is also on hold with the COVID-19 outbreak.    Contact clinic by phone right away if increasing palpitations or shortness of breath or lightheadedness develop.    Follow-up with dermatology for your history of skin cancer, to be determined.

## 2021-06-08 NOTE — PROGRESS NOTES
"Richie Gordillo is a 79 y.o. male who is being evaluated via a billable telephone visit.      The patient has been notified of following:     \"This telephone visit will be conducted via a call between you and your physician/provider. We have found that certain health care needs can be provided without the need for a physical exam.  This service lets us provide the care you need with a short phone conversation.  If a prescription is necessary we can send it directly to your pharmacy.  If lab work is needed we can place an order for that and you can then stop by our lab to have the test done at a later time.    Telephone visits are billed at different rates depending on your insurance coverage. During this emergency period, for some insurers they may be billed the same as an in-person visit.  Please reach out to your insurance provider with any questions.    If during the course of the call the physician/provider feels a telephone visit is not appropriate, you will not be charged for this service.\"    Patient has given verbal consent to a Telephone visit? Yes    What phone number would you like to be contacted at? [phone]     Patient would like to receive their AVS by AVS Preference: Marisa.    Additional provider notes:     [facility] phone consult    Richie Gordillo   79 y.o. male    Date of Visit: 5/12/2020    Chief Complaint   Patient presents with     Follow-up     Subjective  Patient requested a phone consult to avoid clinic visit during the coronavirus outbreak.    Patient has a past history of hypertension and chronic renal insufficiency.    Patient also has history of significant vascular disease and paroxysmal atrial flutter/fibrillation.    Patient had his right eye removed the fall 2019.  He had a previous ischemic event and had chronic eye pain.    He had a perioperative complication of paroxysmal atrial fibrillation, initially cardioverted in January, then recurred spontaneously in " February.    He was put on sotalol 80 mg twice daily but on February 26 he was back in sinus rhythm and did not need electrocardioversion.    Since then he is not had palpitations or racing heart rate feeling.  He is on Xarelto and aspirin 81 mg a day.    I did review December 2019 heart echo with mild LVH.  Mild to moderate aortic stenosis.  Ejection fraction 55%.    He quit smoking December 2019.  March 2020 chest CT scan negative for mass or nodule.  He has Spiriva and inhalers but not needing.  He denies increasing cough or shortness of breath.    No chest pain or chest pressure.    He is on aspirin and Lipitor 40 mg with a history of bilateral carotid artery stenosis 50-69%.  No history of stroke.    History of right iliac stent without claudication.    BPH stable on Flomax.    Past history of flat sessile polyps October 2018 colonoscopy, he is overdue for his one-year follow-up.  No change in stools or blood in stool.  No new abdominal pain per patient today.    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  I did review the dermatology note today.  Patient does not know when his follow-up is.  He has not noticed new skin lesions.    Uses Remeron to sleep nightly.  He denies any falls.  Lives independently with his wife, Sharmin.    PMHx:    Past Medical History:   Diagnosis Date     Arthritis      Atrial fibrillation (H)      BPH (benign prostatic hyperplasia)      Central retinal artery occlusion, right eye      Chronic kidney disease     CKD     Essential tremor      History of alcohol abuse     in remission     HTN (hypertension)      Hyperlipidemia      Hyponatremia      Leg mass, left      Murmur      PVD (peripheral vascular disease) (H)      Renal Tubular Acidosis      secondary to bladder outlet obstruction; resolved       Sleep apnea      Stroke (H)     TIA     PSHx:    Past Surgical History:   Procedure Laterality Date     CARDIOVERSION  01/30/2020     CATARACT EXTRACTION        EXTERNAL EAR SURGERY      mastoid     EYE SURGERY       ILIAC ARTERY STENT Right     twice, 2009 and 2010     LEG SKIN LESION  BIOPSY / EXCISION Left 7/27/2017    Procedure: EXCISION LEFT POSTERIOR LEG MASS;  Surgeon: Joseluis Fernandez MD;  Location: [facility];  Service:      MI TRANSURETHRAL ELEC-SURG PROSTATECTOM      Description: Transurethral Resection Of Prostate (TURP);  Recorded: 04/27/2009;     Immunizations:   Immunization History   Administered Date(s) Administered     DT (pediatric) 09/21/2005     Influenza high dose,seasonal,PF, 65+ yrs 02/13/2019, 09/26/2019     Influenza, inj, historic,unspecified 11/14/2011     Influenza, seasonal,quad inj 6-35 mos 01/14/2013     Pneumo Conj 13-V (2010&after) 04/06/2017     Pneumo Polysac 23-V 03/03/2009     Td,adult,historic,unspecified 09/21/2005     Tdap 09/28/2013     ZOSTER, LIVE 12/08/2011       ROS A comprehensive review of systems was performed and was otherwise negative    Medications, allergies, and problem list were reviewed and updated    Exam  There were no vitals taken for this visit.  Voice quality appears normal on phone.    Assessment/Plan  1. Hypertension  Patient reports blood pressures running in the 160s over 70 range.  Denies orthostasis or edema.    With his chronic renal insufficiency, choice is limited.    Increase amlodipine to 10 mg a day.  Patient was warned about risk of fatigue or lightheaded dizzy spells or syncope and lower extremity edema.    Continue the Lasix at 40 mg daily.    Could increase furosemide to twice a day if needed.    I stressed the importance of increased regular walking.  He is not been doing his regular walking recently with the coronavirus outbreak.    Maintain low-salt diet.    Phone consult for May 27.  He does have a home blood pressure cuff.  - amLODIPine (NORVASC) 10 MG tablet; Take 1 tablet (10 mg total) by mouth daily.  Dispense: 90 tablet; Refill: 3    2. Paroxysmal atrial fibrillation (H)  No  symptomatic recurrence.  Patient needs to follow-up with cardiology for an EKG, 14-day heart monitor and a stress test that they had planned.    This work-up is on hold with the current coronavirus outbreak.    He may be able to initiate this in June.    He will continue on the Xarelto in addition to aspirin 81 mg, because of the severe vascular disease history in addition to the atrial fibrillation.  He denies any bleeding issues.    He will continue on the sotalol 80 mg twice daily.    3. Chronic kidney disease, unspecified CKD stage  Stable creatinine 1.9 in February.    4. Carotid artery stenosis, asymptomatic, bilateral  Asymptomatic.  Lipitor 40 mg and aspirin in addition to the Xarelto.    5. Aortic stenosis, moderate  Mild to moderate, asymptomatic on December 2018 heart echo.  Repeat heart echo early next year    6. Pulmonary emphysema, unspecified emphysema type (H)  Stable.  Can restart Spiriva and albuterol if needed.  Quit smoking December 2019.    Yearly chest CT scan in March negative.    7. History of colonic polyps  High risk polyps.  Overdue for 1 year follow-up colonoscopy.  Colonoscopy on hold with current coronavirus outbreak.    Patient will plan to move ahead with the colonoscopy this summer when able.    I will plan to have him stop the Xarelto for 2 days and continue on aspirin 81 mg a day, given the above history per stroke risk.    8. History of skin cancer  Follow-up with dermatology to be determined, would anticipate this summer    9. History of tobacco use  Yearly CT scan in March negative.    BPH controlled on Flomax.    History of right eye pain with removal and prosthesis.    Return in 15 days (on 5/27/2020) for Phone consult.   Patient Instructions   Increase amlodipine to 10 mg a day.  Contact me by phone if you develop lightheaded dizzy spells or increasing leg swelling.    Goal blood pressure 120//80.    Increase daily walking outside in a safe fashion.  Goal for 20-minute  walk daily.  Daily walking helps the blood pressure.    Maintain a low-salt diet.    You will need to wait on your colonoscopy, but plan to schedule at this summer as soon as able.    Cardiology still plans to have you come in for an EKG, heart monitor and possibly a stress test.  That is also on hold with the COVID-19 outbreak.    Contact clinic by phone right away if increasing palpitations or shortness of breath or lightheadedness develop.    Follow-up with dermatology for your history of skin cancer, to be determined.    Justo Tom MD        Current Outpatient Medications   Medication Sig Dispense Refill     amLODIPine (NORVASC) 10 MG tablet Take 1 tablet (10 mg total) by mouth daily. 90 tablet 3     aspirin 81 MG EC tablet Take 1 tablet (81 mg total) by mouth daily. 90 tablet 3     atorvastatin (LIPITOR) 40 MG tablet TAKE 1 TABLET DAILY (THIS REPLACES SIMVASTATIN) 90 tablet 3     cholecalciferol, vitamin D3, 400 unit Tab Take 400 Units by mouth daily.       cyanocobalamin (VITAMIN B-12) 500 MCG tablet Take 500 mcg by mouth daily.       ferrous gluconate 256 mg (28 mg iron) Tab Take 1 tablet by mouth daily.       furosemide (LASIX) 40 MG tablet Take 1 tablet (40 mg total) by mouth daily. 90 tablet 3     mirtazapine (REMERON) 30 MG tablet Take 1 tablet (30 mg total) by mouth at bedtime. 90 tablet 3     multivitamin therapeutic (THERAGRAN) tablet Take 1 tablet by mouth every evening.        omega-3 fatty acids-vitamin E (FISH OIL) 1,000 mg cap Take 1,000 mg by mouth daily.        rivaroxaban (XARELTO) 15 mg tablet Take 1 tablet (15 mg total) by mouth daily with supper. 90 tablet 3     sotaloL (BETAPACE) 80 MG tablet Take 1 tablet (80 mg total) by mouth 2 (two) times a day. 60 tablet 2     tamsulosin (FLOMAX) 0.4 mg cap Take 1 capsule (0.4 mg total) by mouth daily. 90 capsule 3     tiotropium (SPIRIVA WITH HANDIHALER) 18 mcg inhalation capsule Place 1 capsule (2 puffs total) into inhaler and inhale daily. Close  and press button to crush and then inhale in 2 breaths. (Patient taking differently: Place 18 mcg into inhaler and inhale as needed. Close and press button to crush and then inhale in 2 breaths.) 90 capsule 5     vitamin E acetate (VITAMIN E ORAL) Take 1 tablet by mouth daily.       No current facility-administered medications for this visit.      Allergies   Allergen Reactions     Ofloxacin Hives     Quinolones Itching     Social History     Tobacco Use     Smoking status: Former Smoker     Packs/day: 0.50     Types: Cigarettes     Start date: 1962     Last attempt to quit: 2019     Years since quittin.4     Smokeless tobacco: Never Used   Substance Use Topics     Alcohol use: No     Comment: hx alcohol abuse in remission     Drug use: No             Phone call duration: 11 minutes    Alberta Vázquez, CMA

## 2021-06-08 NOTE — PATIENT INSTRUCTIONS - HE
Continue with current medications.    If you do develop lightheaded dizzy spells and/or blood pressures less than 110/60, you will need to reduce the amlodipine back to 5 mg a day.    A referral has been placed to [place] [facility] for your colonoscopy.  Do not take Xarelto for 2 days prior to the colonoscopy and do not take Xarelto day of colonoscopy.  Do not stop your aspirin for the colonoscopy.    Do not take your furosemide on the day of the colonoscopy.    Plan to follow-up with cardiology this July.  Discuss with cardiology about proceeding with the heart stress test and getting another EKG.    Follow-up with me for routine follow-up on August 7 at 10:40 AM.  You do not need to fast for that visit.    You are due to see dermatology for routine skin cancer follow-up this summer.

## 2021-06-08 NOTE — TELEPHONE ENCOUNTER
Refill Approved    Rx renewed per Medication Renewal Policy. Medication was last renewed on 2/20/19.    Kendra Zarate, [facility] Triage/Med Refill 5/19/2020     Requested Prescriptions   Pending Prescriptions Disp Refills     mirtazapine (REMERON) 30 MG tablet [Pharmacy Med Name: MIRTAZAPINE TABS 30MG] 90 tablet 3     Sig: TAKE 1 TABLET AT BEDTIME       Tricyclics/Misc Antidepressant/Antianxiety Meds Refill Protocol Passed - 5/17/2020  9:24 AM        Passed - PCP or prescribing provider visit in last year     Last office visit with prescriber/PCP: 2/28/2020 Justo Tom MD OR same dept: 2/28/2020 Justo Tom MD OR same specialty: 2/28/2020 Justo Tom MD  Last physical: 10/16/2019 Last MTM visit: Visit date not found   Next visit within 3 mo: Visit date not found  Next physical within 3 mo: Visit date not found  Prescriber OR PCP: Justo Tom MD  Last diagnosis associated with med order: 1. Depression  - mirtazapine (REMERON) 30 MG tablet [Pharmacy Med Name: MIRTAZAPINE TABS 30MG]; TAKE 1 TABLET AT BEDTIME  Dispense: 90 tablet; Refill: 3    If protocol passes may refill for 12 months if within 3 months of last provider visit (or a total of 15 months).

## 2021-06-09 NOTE — PROGRESS NOTES
Vitals - Patient Reported  Systolic (Patient Reported): 135  Diastolic (Patient Reported): 69  Weight (Patient Reported): 142 lb (64.4 kg)  Pulse (Patient Reported): 62    Review of Systems - History obtained from the patient  General ROS: negative  Psychological ROS: negative  Ophthalmic ROS: negative  ENT ROS: negative  Hematological and Lymphatic ROS: positive for - easy bleeding and easy bruising  Respiratory ROS: negative  Cardiovascular ROS: positive for - shortness of breath  Gastrointestinal ROS: negative  Genito-Urinary ROS: negative  Musculoskeletal ROS: positive for - muscular weakness  Neurological ROS: positive for - loss of balance    Dermatological ROS: negative

## 2021-06-09 NOTE — PROGRESS NOTES
Pt stated he took his blood pressure again, it was 129/58 and recheck 125/63. He stated he will continue is current medication dose and will follow up with jj on 7/14 with a log of Bps until then.    Pt will call with any further BP concerns up until apptAngeles Morton

## 2021-06-09 NOTE — PATIENT INSTRUCTIONS - HE
Richie Gordillo,    It was a pleasure to see you today at [facility].     My recommendations after this visit include:  - Please follow up with Dr Perez in November   - Please follow up with Kika Perez in September  - Continue current medications    Kika Perez, CNP

## 2021-06-09 NOTE — TELEPHONE ENCOUNTER
Wellness Screening Tool  Symptom Screening:  Do you have one of the following NEW symptoms:    Fever (subjective or >100.0)?  No    A new cough?  No    Shortness of breath?  No     Chills? No     New loss of taste or smell? No     Generalized body aches? No     New persistent headache? No     New sore throat? No     Nausea, vomiting, or diarrhea?  No    Within the past 3 weeks, have you been exposed to someone with a known positive illness below:    COVID-19 (known or suspected)?  No    Chicken pox?  No    Mealses?  No    Pertussis?  No    Patient notified of visitor policy- They may have one person accompany them to their appointment, but they will need to wear a mask and will be screened upon arrival for symptoms: Yes  Pt informed to wear a mask: Yes  Pt notified if they develop any symptoms listed above, prior to their appointment, they are to call the clinic directly at [phone] for further instructions.  Yes  Patient's appointment status: Patient will be seen in clinic as scheduled on 7/9

## 2021-06-09 NOTE — PROGRESS NOTES
Noted, spoke to pt who states understanding.  Pt states BP was quite lower than normal today, denies any symptoms associated with lower BP, educated pt that we are not concerned with 96/60 as long as he is not having symptoms.  He will continue to monitor his BP daily and will call if he notices feeling different with lower BP.    Has appt with Kika Eduardo on 7/14.    No further questions or concerns at this time.    Selene

## 2021-06-09 NOTE — PROGRESS NOTES
"EKG done per request after virtual visit on 6/29 to check for bradycardia.  Per your office note \"Persistent atrial fibrillation: Dx 12/2019. CV 1/30/20 with early recurrence. Spontaneous conversion on sotalol 80 mg two times a day. Needs EKG as he was previously bradycardic.\"  There was no change in Sotolol dose, pt continues to take Sotolol 80mg two times a day.      Pt EKG shows vent rate 59bpm, SB with 1st degree AVB.    Previous EKG dated 2/26/20 showed vent rate 47bpm, SB 1st degree AVB .    Will forward to Renee Gonzales NP to review.    Selene      "

## 2021-06-09 NOTE — PROGRESS NOTES
"[facility] Follow Up Note    Richie Gordillo   76 y.o. male    Date of Visit: 4/6/2017    Chief Complaint   Patient presents with     Medication Management     Subjective  Richie \"Adonay\" comes in today accompanied by his wife.  I have not seen him since last fall.  He tells me he is doing fine.  He does not exercise much and has ongoing issues with chronic peripheral vascular disease and is followed in the vascular center.  He has chronic issues with depression which she feels is well controlled and hypertension and he takes his medications faithfully.  Has a chronically low sodium level which ranges from 126-135.  We had such difficult time controlling his blood pressure that he is on some hydrochlorothiazide with the anticipation of needing to take this away if his sodium level drops any further.    ROS A comprehensive review of systems was performed and was otherwise negative    Social History:   Social History     Social History Narrative    He is a retired  and last worked at [employer]. He also was a dental technician. He is  and has 3 children and 8 grandchildren.       Medications were reconciled.  Allergies, social and family history, and the problem list were all reviewed and updated.      Exam  General Appearance: Pleasant and alert  Vitals:    04/06/17 1054   BP: 128/70   Pulse: 100   Weight: 154 lb (69.9 kg)   Height: 5' 9\" (1.753 m)      Body mass index is 22.74 kg/(m^2).  Wt Readings from Last 3 Encounters:   04/06/17 154 lb (69.9 kg)   02/28/17 146 lb (66.2 kg)   09/02/16 146 lb 3.2 oz (66.3 kg)     HEENT: Sclera are clear.   Lungs: Normal respirations  Cardiac: Regular rate and rhythm with a 1 to 2/6 systolic murmur  Abdomen: Soft and nondistended  Extremities: No edema  Skin: No rashes  Neuro: Moves all extremities and has facial symmetry  Gait: Ambulates with a normal gait    Assessment/Plan  1. Hyponatremia  Recheck labs today, if sodium level is worse will " attempt to discontinue the hydrochlorothiazide and his blood pressure medicine and follow his blood pressure closely.  Cannot use spironolactone due to potassium and renal insufficiency.  - Basic Metabolic Panel  - HM2(CBC w/o Differential)    2. Chronic Renal Insufficiency  Labs to see if stable.    3. Essential hypertension with goal blood pressure less than 140/90  As above, blood pressure today is well controlled.  May need to adjust medications based on blood test results today.      Linnette Ziegler MD  4/6/2017    Much or all of the text in this note was generated through the use of Dragon Dictate voice-to-text software. Errors in spelling or words which seem out of context are unintentional. Sound alike errors, in particular, may have escaped editing.

## 2021-06-09 NOTE — PROGRESS NOTES
Follow-up, pad w/ claudication, and carotid stenosis and Hx of retinal artery embolism, however last carotid US 06/2015 put stenosis at 50-69% bilaterally. Patient was to continue walking program, encourage collateral flow and stop smoking. Patient to have repeat carotid US. Patient has incorporated a walking program, he can, on some days walk 1/2 mile. Patient continues to smoke. No recent c/o TIA/CVA, AF, or other neurologic symptoms.

## 2021-06-09 NOTE — PROGRESS NOTES
Noted.  Phone call to patient and reviewed the below information.  He states that he checked his blood pressure again today and noted it to be 139/80.  He is not sure that he should decrease his dose.    Upon further discussion he states that he checks his blood pressure first thing in the am before taking his pills.  Explained that it is most effective for him to take his blood pressure 1-2 hours after his medications with his feet flat on the floor, and after resting for 10-15 minutes.  He states understanding.  He will check his blood pressure again and call back with the numbers.  Reviewed contact information.

## 2021-06-09 NOTE — PATIENT INSTRUCTIONS - HE
Richie,     Scheduling will call you to get you set up for EKG and labs.     This is all just routine with the medications you are on including the sotalol and Xarelto.     In the mean time, make a goal to walk 20 minutes, 5 times weekly, and try and get that up to 30 minutes 5 times weekly.     Let us know if any chest pain or worsened shortness of breath.     Take Care,   BRANDON Duran

## 2021-06-10 NOTE — PROGRESS NOTES
[facility] Follow Up Note    Richie Gordillo   79 y.o. male    Date of Visit: 8/7/2020    Chief Complaint   Patient presents with     Follow-up     Subjective  Evert is here for follow-up of multiple medical issues.    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  No complications with that currently.    Patient had a perioperative atrial fibrillation episode, and is now on sotalol 80 mg twice daily.  No further palpitations or recurrent atrial fibrillation.    No history of stroke, on Xarelto.  No bleeding issues.    I did review the heart echo from December 2019 with mild LVH and mild to moderate aortic stenosis.    I did review the EKG personally from July 9, 2020 with sinus bradycardia.  Normal .    Patient also has bilateral carotid artery stenosis 50-69%.  On aspirin 81 mg.    Also severe vascular disease with right iliac stent but no claudication.    He is also on aspirin 81 mg.    No chest pain or chest pressure.    Hypertension has been controlled at home in the 130s over 60s to 70s.    No significant lower extremity edema on amlodipine 10 mg a day which was increased last May.    Also on Lasix 40 mg a day.    He does have chronic renal insufficiency with creatinine 1.5-1.7 last year, but was 1.9 in January.    No increasing shortness of breath.    He quit smoking December 2019.  I did review the chest CT scan from March 2020, negative for nodule, old stable scarring noted.  No lymphadenopathy.    No new cough or change in cough or hemoptysis or new oral lesions.    He did start smoking again last month, he is having some stress with his daughter and granddaughter living in the house now.    No fever or COVID-19 exposure, that he knows of.    Past history of flat sessile polyp October 2018 with a 1 year follow-up plan, he is overdue for colonoscopy.  Apparently did show for the colonoscopy but he forgot to stop the Xarelto and they canceled it.   He has not rescheduled the colonoscopy.  His bowels are normal and no blood in stool.    I did review labs from last month with a hemoglobin increasing to 13.1.    BNP was down to 270.    I did review the cardiology virtual note from July 2020.  No change in treatment plan at that time    Past history of skin cancer was squamous cell cancer January 2020.  He did not schedule his summer follow-up appointment with dermatology.    BPH symptoms controlled on Flomax.  PSA in 2019 was 0.6 without plans for further screening.    PMHx:    Past Medical History:   Diagnosis Date     Arthritis      Atrial fibrillation (H)      BPH (benign prostatic hyperplasia)      Central retinal artery occlusion, right eye      Chronic kidney disease     CKD     Essential tremor      History of alcohol abuse     in remission     HTN (hypertension)      Hyperlipidemia      Hyponatremia      Leg mass, left      Murmur      PVD (peripheral vascular disease) (H)      Renal Tubular Acidosis      secondary to bladder outlet obstruction; resolved       Sleep apnea      Stroke (H)     TIA     PSHx:    Past Surgical History:   Procedure Laterality Date     CARDIOVERSION  01/30/2020     CATARACT EXTRACTION       EXTERNAL EAR SURGERY      mastoid     EYE SURGERY       ILIAC ARTERY STENT Right     twice, 2009 and 2010     LEG SKIN LESION  BIOPSY / EXCISION Left 7/27/2017    Procedure: EXCISION LEFT POSTERIOR LEG MASS;  Surgeon: Joseluis Fernandez MD;  Location: [facility];  Service:      IN TRANSURETHRAL ELEC-SURG PROSTATECTOM      Description: Transurethral Resection Of Prostate (TURP);  Recorded: 04/27/2009;     Immunizations:   Immunization History   Administered Date(s) Administered     DT (pediatric) 09/21/2005     Influenza high dose,seasonal,PF, 65+ yrs 02/13/2019, 09/26/2019     Influenza, inj, historic,unspecified 11/14/2011     Influenza, seasonal,quad inj 6-35 mos 01/14/2013     Pneumo Conj 13-V (2010&after) 04/06/2017     Pneumo  Polysac 23-V 03/03/2009     Td,adult,historic,unspecified 09/21/2005     Tdap 09/28/2013     ZOSTER, LIVE 12/08/2011       ROS A comprehensive review of systems was performed and was otherwise negative    Medications, allergies, and problem list were reviewed and updated    Exam  /68 (Patient Site: Right Arm, Patient Position: Sitting, Cuff Size: Adult Regular)   Pulse 60   Wt 143 lb 9.6 oz (65.1 kg)   BMI 21.83 kg/m    Alert and oriented.  Lungs with mildly decreased breath sounds throughout but no crackles or wheezing.  Heart is regular without murmur rub or gallop.  I did not hear 2 out of 6 systolic murmur noted previously.  There is no ankle edema.  Abdomen nontender.  No neurologic changes.    Assessment/Plan  1. Hypertension  Controlled on recheck although borderline high systolic.  History of vascular disease, runs a low diastolic and I wish to avoid hypotension/hypoperfusion.    Continue current medications with amlodipine 10 mg a day and Lasix 40 mg.    No ARB with elevated creatinine.    Could adjust furosemide in the future if needed.    2. Chronic kidney disease, unspecified CKD stage  Recheck kidney labs today.  Avoid NSAIDs except for daily aspirin.    BPH controlled with Flomax    3. Paroxysmal atrial fibrillation (H)  No evidence of recurrence.  Sotalol twice daily and Xarelto.    See patient instructions for recommendations for his upcoming colonoscopy and anticoagulation.    4. Carotid artery stenosis, asymptomatic, bilateral  Continue aspirin 81 mg a day.  I did discuss increased bleeding risk with his colonoscopy, but with his severe vascular disease I will have him continue on the low-dose aspirin for the colonoscopy, except hold on day of colonoscopy.    Continue Lipitor 40 mg.    August 2019 LDL 46 and HDL 33.    He is not fasting today.  Plan fasting labs next February    5. Aortic stenosis, moderate  Asymptomatic.  Plan to repeat heart echo next winter    6. History of colonic  polyps  I explained risk of undiagnosed colon cancer to patient.  I strongly emphasized the need to reschedule his colonoscopy.  Patient will contact GI to schedule that.    See patient instructions for anticoagulation.    I will have him skip the furosemide on day of procedure.    7. History of skin cancer  He is overdue for his dermatology follow-up, but he wants to put that off at this time with current COVID-19 stress.  Follow-up with dermatology when he is ready to do that for history of squamous cell cancer    8. Encounter for therapeutic drug monitoring    - Comprehensive Metabolic Panel    9. History of tobacco use  Patient relapsed on smoking last month.  He did not want his wife to know at this time.  I did discuss importance of quitting with patient.  He is planning to set another quit date.  He had been a non-smoker from December of last year until July of this year.    Spiriva inhaler    Plan to repeat yearly chest CT scan in March of next year.    At risk with younger family members living with him I discussed importance of isolation even within the house from the younger family members.  I recommended he wear a mask around other family members.  Patient was told of the phone number to call for COVID-19 testing if needed.    Return in 6 months (on 2/9/2021) for Recheck.   Patient Instructions   Continue to monitor blood pressure.  Contact me if blood pressure running above 140/80.    No medication changes today.    If you develop a fever or are concerned about a COVID-19 exposure, call the on-care service here at [place], phone number [phone].    Reschedule your colonoscopy.  Do not take the Xarelto for 2 days before the colonoscopy or the day of colonoscopy.  Restart Xarelto day after colonoscopy.    Continue the aspirin 81 mg a day, but you could skip it on the day of colonoscopy.    Skip your furosemide on the day of colonoscopy, otherwise take your other medication as you usually would, with  a sip of water that morning.    Checking kidney and liver test today.    See me for an adult wellness visit with fasting labs in approximately 6 months.    Justo Tom MD        Current Outpatient Medications   Medication Sig Dispense Refill     amLODIPine (NORVASC) 10 MG tablet Take 1 tablet (10 mg total) by mouth daily. 90 tablet 3     aspirin 81 MG EC tablet Take 1 tablet (81 mg total) by mouth daily. 90 tablet 3     atorvastatin (LIPITOR) 40 MG tablet TAKE 1 TABLET DAILY (THIS REPLACES SIMVASTATIN) 90 tablet 3     cholecalciferol, vitamin D3, 400 unit Tab Take 400 Units by mouth daily.       cyanocobalamin (VITAMIN B-12) 500 MCG tablet Take 500 mcg by mouth daily.       ferrous gluconate 256 mg (28 mg iron) Tab Take 1 tablet by mouth daily.       furosemide (LASIX) 40 MG tablet Take 1 tablet (40 mg total) by mouth daily. 90 tablet 3     mirtazapine (REMERON) 30 MG tablet TAKE 1 TABLET AT BEDTIME 90 tablet 3     multivitamin therapeutic (THERAGRAN) tablet Take 1 tablet by mouth every evening.        omega-3 fatty acids-vitamin E (FISH OIL) 1,000 mg cap Take 1,000 mg by mouth daily.        rivaroxaban (XARELTO) 15 mg tablet Take 1 tablet (15 mg total) by mouth daily with supper. 90 tablet 3     sotaloL (BETAPACE) 80 MG tablet Take 1 tablet (80 mg total) by mouth 2 (two) times a day. 180 tablet 1     tamsulosin (FLOMAX) 0.4 mg cap Take 1 capsule (0.4 mg total) by mouth daily. 90 capsule 3     tiotropium (SPIRIVA WITH HANDIHALER) 18 mcg inhalation capsule Place 1 capsule (2 puffs total) into inhaler and inhale daily. Close and press button to crush and then inhale in 2 breaths. (Patient taking differently: Place 18 mcg into inhaler and inhale as needed. Close and press button to crush and then inhale in 2 breaths.) 90 capsule 5     vitamin E acetate (VITAMIN E ORAL) Take 1 tablet by mouth daily.       No current facility-administered medications for this visit.      Allergies   Allergen Reactions     Ofloxacin  Hives     Quinolones Itching     Social History     Tobacco Use     Smoking status: Current Every Day Smoker     Packs/day: 0.50     Types: Cigarettes     Start date: 1962     Last attempt to quit: 2019     Years since quittin.6     Smokeless tobacco: Never Used     Tobacco comment: Quit 19 and restarted 20   Substance Use Topics     Alcohol use: No     Comment: hx alcohol abuse in remission     Drug use: No

## 2021-06-10 NOTE — PATIENT INSTRUCTIONS - HE
Continue to monitor blood pressure.  Contact me if blood pressure running above 140/80.    No medication changes today.    If you develop a fever or are concerned about a COVID-19 exposure, call the on-care service here at [place], phone number [phone].    Reschedule your colonoscopy.  Do not take the Xarelto for 2 days before the colonoscopy or the day of colonoscopy.  Restart Xarelto day after colonoscopy.    Continue the aspirin 81 mg a day, but you could skip it on the day of colonoscopy.    Skip your furosemide on the day of colonoscopy, otherwise take your other medication as you usually would, with a sip of water that morning.    Checking kidney and liver test today.    See me for an adult wellness visit with fasting labs in approximately 6 months.

## 2021-06-11 NOTE — TELEPHONE ENCOUNTER
----- Message from Erick Kurtz sent at 9/30/2020  9:04 AM CDT -----  Regarding: [facility] PT - STRESS TEST  General phone call:    Caller: Richie Malik     Primary cardiologist: Scarlett     Detailed reason for call: Pt is requesting for a call back to discuss if the stress test that Renee ordered for him is really necessary. Pt feels that it is not.     Best phone number: [phone]    Best time to contact: Anytime     Ok to leave a detailed message? Yes     Device? No     Additional Info:

## 2021-06-11 NOTE — TELEPHONE ENCOUNTER
"Richie is contacted today to discuss his concerns regarding the need for the cardiac stress testing that was advised by the [facility]. He was given the information today that often times a person can have Afib due to there being an area of blockage or \"ischemia\" in the coronary arteries. This stress test can assist in determining if this is the cause of the Afib for him.   It should not be overlooked or ignored, if this was the cause, then there could be intervention that may improve his overall heart function and heart rhythm.    He will reach out to the  to arrange this testing today. sk/RN  "

## 2021-06-11 NOTE — PROGRESS NOTES
ASSESSMENT:  1.  Large abscess left posterior thigh: I feel this is the cause of his systemic symptoms of illness such as headache and feverishness.  2.  Aortic stenosis: This was judged as mild by echocardiogram in June 2015.  His murmur is quite prominent currently.  He was advised to discuss timing of a follow-up study with Dr. Ziegler at next appointment    PLAN:  1.  After sterilely prepping and local anesthesia with 1% lidocaine with epinephrine, abscess cavity was opened using a #11 blade.  A large amount of malodorous, bloody purulent material was expressed from the abscess cavity.  There were still areas of induration at the margins suggesting that the abscess may be loculated  2.  Wound culture was done  3.  Cephalexin 500 mg 4 times daily for 10 days  4.  Hydrocodone 5/325 1-2 4 times daily if needed for pain  5.  Wound care was reviewed  6.  Surgery clinic follow-up in several days  No orders of the defined types were placed in this encounter.    There are no discontinued medications.        ASSESSED PROBLEMS:  No diagnosis found.    CHIEF COMPLAINT:  Chief Complaint   Patient presents with     Headache     x 7 days     Mass     upper left leg       HISTORY OF PRESENT ILLNESS:  Richie is a 76 y.o. male presenting to the clinic today with complaints of leg pain.  He is normally followed by Dr. Ziegler. He has noticed a lump on his left leg that is very tender to the touch and causes him significant discomfort. He first noticed the lump about 3 weeks ago. He believes that the lump is a cyst and has noticed that the lump has grown, gradually, since he first noticed it. Of note, he has had several cysts in the past. However, none of his previous cysts have been as painful. He has noticed some clear drainage from the area of tenderness. About a week ago he began to feel ill, as though he had the flu without a cough. He has felt mildly feverish, fatigued, some myalgias, and a few head aches. He has  "shivered a few times but denies any chills.     REVIEW OF SYSTEMS:   He denies any abnormal cough.   All other systems are negative.    PFSH:  He believes that any record of ciprofloxacin allergy is erroneous.     TOBACCO USE:  History   Smoking Status     Former Smoker     Packs/day: 0.25     Types: Cigarettes     Start date: 2/16/1960   Smokeless Tobacco     Never Used       VITALS:  Vitals:    06/27/17 1404   BP: 148/68   Patient Site: Left Arm   Patient Position: Sitting   Cuff Size: Adult Large   Pulse: 62   Resp: 14   Temp: 99.1  F (37.3  C)   TempSrc: Oral   Weight: 156 lb 4.8 oz (70.9 kg)   Height: 5' 9\" (1.753 m)     Wt Readings from Last 3 Encounters:   06/27/17 156 lb 4.8 oz (70.9 kg)   04/06/17 154 lb (69.9 kg)   02/28/17 146 lb (66.2 kg)       PHYSICAL EXAM:   Constitutional:   Reveals an alert, pleasant, talkative male who smells of tobacco.   Vitals: per nursing notes.  HEENT:  Atraumatic.  Neck:  Supple, no carotid bruits or adenopathy.  Back:  No spine or CVA pain.  Thorax:  No bony deformities.  Lungs: Diminished air movement minor expiratory rhonchi.  Respiratory effort normal.  Cardiac:   Regular rate and rhythm, normal S1, S2, II/VI systolic ejection murmur left sternal border to aortic area.   Abdomen:  Soft, active bowel sounds without bruits, mass, or tenderness.  Extremities:   Large irregular erythematous mass in the posterior aspect of left upper thigh, small scabbed area noted.    Skin:  Otherwise clear.   Neuro:  Alert and oriented. Cranial nerves, motor, sensory exams are intact.  No gross focal deficits.  Psychiatric:  Memory intact, mood appropriate.    ADDITIONAL HISTORY SUMMARIZED (2): None.  DECISION TO OBTAIN EXTRA INFORMATION (1): None.   RADIOLOGY TESTS (1): None.  LABS (1): Labs ordered.  MEDICINE TESTS (1): None.  INDEPENDENT REVIEW (2 each): None.     The visit lasted a total of 32 minutes face to face with the patient. Over 50% of the time was spent counseling and educating " the patient about cysts.    I, Edmund Packer, am scribing for and in the presence of, Dr. Macedo.    I, Dr. Macedo, personally performed the services described in this documentation, as scribed by Edmund Packer in my presence, and it is both accurate and complete.    Dragon dictation was used for this note.  Speech recognition errors are a possibility.    MEDICATIONS:  Current Outpatient Prescriptions   Medication Sig Dispense Refill     amLODIPine (NORVASC) 5 MG tablet TAKE 1 TABLET DAILY 90 tablet 3     aspirin 81 MG EC tablet Take 1 tablet (81 mg total) by mouth daily. 90 tablet 3     cholecalciferol, vitamin D3, 400 unit Tab Take 400 Units by mouth daily.       clopidogrel (PLAVIX) 75 mg tablet Take 1 tablet (75 mg total) by mouth daily. 90 tablet 5     cyanocobalamin (VITAMIN B-12) 500 MCG tablet Take 500 mcg by mouth daily.       escitalopram oxalate (LEXAPRO) 10 MG tablet TAKE 1 TABLET DAILY 90 tablet 3     ferrous gluconate 256 mg (28 mg iron) Tab Take 1 tablet by mouth daily.       losartan-hydrochlorothiazide (HYZAAR) 100-25 mg per tablet TAKE 1 TABLET DAILY 90 tablet 3     mirtazapine (REMERON) 30 MG tablet TAKE 1 TABLET AT BEDTIME 90 tablet 3     multivitamin therapeutic (THERAGRAN) tablet Take 1 tablet by mouth daily.       omega-3 fatty acids-vitamin E (FISH OIL) 1,000 mg cap Take 1 capsule by mouth daily.       saw palmetto fruit 450 mg cap Take 1 capsule by mouth daily.       simvastatin (ZOCOR) 40 MG tablet TAKE 1 TABLET DAILY (DUE FOR LABS) 90 tablet 2     tamsulosin (FLOMAX) 0.4 mg Cp24 Take 1 capsule (0.4 mg total) by mouth daily. 90 capsule 3     TOPROL  mg 24 hr tablet TAKE 1 TABLET DAILY 90 tablet 3     No current facility-administered medications for this visit.        Total data points: 1.

## 2021-06-12 NOTE — PROGRESS NOTES
Preoperative Consultation    Richie Gordillo   76 y.o.  male    Date of visit: 7/24/2017    This is a preoperative consultation requested by Dr. Joseluis Fernandez in preparation for abscess removal surgery on July 27 at [facility], fax [phone].    HPI:  Richie was in and saw Dr. Sean Macedo on June 27 and had a buttock mass I&D.  He was then sent to see Dr. Fernandez and this was further looked at in the office and needs to have surgical debridement and removal as there seemed to be multiple loculated lesions together.  He is feeling much improved.  He specifically denies any chest pain or shortness of breath with exertion.  He has an innocent cardiac murmur as demonstrated by previous echocardiograms.      Review of systems:  A comprehensive review of systems was performed and was otherwise negative    Allergies   Allergen Reactions     Ciprofloxacin Itching       Recent anticoagulant use: yes Aspirin and Plavix, will hold until after surgery    Personal or family history of clotting disorder: no    Prolonged steroid use in the past year: no    Past difficulty with anesthesia:  no    Family history of difficulty with anesthesia: no    Current cardiopulmonary symptoms: no    Patient Active Problem List   Diagnosis     Alcohol Abuse - In Remission     Chronic Renal Insufficiency     Hyperlipidemia     Hypertension     Peripheral Vascular Disease     Homocysteinemia     Obstructive Sleep Apnea     Familial (Benign Essential) Tremor     Renal Tubular Acidosis     Benign Prostatic Hypertrophy     Benign Polyps Of The Large Intestine     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     Hyponatremia     Innocent heart murmur     Past Medical History:   Diagnosis Date     Arthritis      CKD (chronic kidney disease)      HTN (hypertension)      PVD (peripheral vascular disease)      Sleep apnea      TIA (transient ischemic attack)       Past Surgical History:   Procedure Laterality Date     CATARACT EXTRACTION        EXTERNAL EAR SURGERY      mastoid     ILIAC ARTERY STENT Right     twice,  and      NC TRANSURETHRAL ELEC-SURG PROSTATECTOM      Description: Transurethral Resection Of Prostate (TURP);  Recorded: 2009;     Family History   Problem Relation Age of Onset     Brain cancer Father       age 76     Pancreatic cancer Mother       age 52     Cancer Brother      metastatic, unknown primary     Heart disease Brother      Social History   Substance Use Topics     Smoking status: Former Smoker     Packs/day: 0.25     Types: Cigarettes     Start date: 1962     Smokeless tobacco: Never Used     Alcohol use No     Social History     Social History Narrative    He is a retired  and last worked at [employer]. He also was a dental technician. He is  and has 3 children and 8 grandchildren.     Current Medications:  Current Outpatient Prescriptions   Medication Sig     amLODIPine (NORVASC) 5 MG tablet TAKE 1 TABLET DAILY     aspirin 81 MG EC tablet Take 1 tablet (81 mg total) by mouth daily.     cholecalciferol, vitamin D3, 400 unit Tab Take 400 Units by mouth daily.     clopidogrel (PLAVIX) 75 mg tablet Take 1 tablet (75 mg total) by mouth daily.     cyanocobalamin (VITAMIN B-12) 500 MCG tablet Take 500 mcg by mouth daily.     escitalopram oxalate (LEXAPRO) 10 MG tablet TAKE 1 TABLET DAILY     ferrous gluconate 256 mg (28 mg iron) Tab Take 1 tablet by mouth daily.     losartan-hydrochlorothiazide (HYZAAR) 100-25 mg per tablet TAKE 1 TABLET DAILY     mirtazapine (REMERON) 30 MG tablet TAKE 1 TABLET AT BEDTIME     multivitamin therapeutic (THERAGRAN) tablet Take 1 tablet by mouth daily.     omega-3 fatty acids-vitamin E (FISH OIL) 1,000 mg cap Take 1 capsule by mouth daily.     simvastatin (ZOCOR) 40 MG tablet TAKE 1 TABLET DAILY (DUE FOR LABS)     tamsulosin (FLOMAX) 0.4 mg Cp24 Take 1 capsule (0.4 mg total) by mouth daily.     TOPROL  mg 24 hr tablet TAKE 1 TABLET DAILY  "    HYDROcodone-acetaminophen 5-325 mg per tablet Take 1-2 tablets by mouth every 4 (four) hours as needed for pain.     saw palmetto fruit 450 mg cap Take 1 capsule by mouth daily.       Physical Exam:    General Appearance:   Pleasant and alert    Vitals:    07/24/17 0947   BP: 132/50   Patient Site: Left Arm   Patient Position: Sitting   Cuff Size: Adult Regular   Pulse: (!) 54   Resp: 16   Weight: 152 lb 2 oz (69 kg)   Height: 5' 9\" (1.753 m)     Body mass index is 22.46 kg/(m^2).    EYES: Eyelids, conjunctiva, and sclera were normal. Pupils were normal.   HEAD, EARS, NOSE, MOUTH, AND THROAT: Head is atraumatic, ears and canals are normal with normal tympanic membranes.  Nose mouth and throat are without lesions.  NECK: Neck appearance was normal. There were no neck masses and the thyroid was not enlarged.  RESPIRATORY: Normal respirations.  Lung sounds were equal bilaterally.  CARDIOVASCULAR: Heart rate and rhythm were normal.  S1 and S2 were normal and there were no extra sounds or murmurs. There was no peripheral edema.  GASTROINTESTINAL: The abdomen was soft and nondistended.     MUSCULOSKELETAL: Skeletal configuration was normal and muscle mass was normal for age. Joint appearance was overall normal.  LYMPHATIC: There were no enlarged nodes palpable.  SKIN/HAIR/NAILS: Skin color was normal.  No rashes.  NEUROLOGIC: The patient was alert and oriented.  Speech was normal.  There is no facial asymmetry.   PSYCHIATRIC:  Mood and affect were normal.         EKG (done here and read by me) normal sinus rhythm with no acute ST-T changes    Results for orders placed or performed in visit on 07/24/17   Basic Metabolic Panel   Result Value Ref Range    Sodium 127 (L) 136 - 145 mmol/L    Potassium 4.8 3.5 - 5.0 mmol/L    Chloride 92 (L) 98 - 107 mmol/L    CO2 25 22 - 31 mmol/L    Anion Gap, Calculation 10 5 - 18 mmol/L    Glucose 102 70 - 125 mg/dL    Calcium 9.2 8.5 - 10.5 mg/dL    BUN 19 8 - 28 mg/dL    Creatinine " 1.35 (H) 0.70 - 1.30 mg/dL    GFR MDRD Af Amer >60 >60 mL/min/1.73m2    GFR MDRD Non Af Amer 52 (L) >60 mL/min/1.73m2   HM2(CBC w/o Differential)   Result Value Ref Range    WBC 6.0 4.0 - 11.0 thou/uL    RBC 4.08 (L) 4.40 - 6.20 mill/uL    Hemoglobin 12.5 (L) 14.0 - 18.0 g/dL    Hematocrit 36.8 (L) 40.0 - 54.0 %    MCV 90 80 - 100 fL    MCH 30.6 27.0 - 34.0 pg    MCHC 33.9 32.0 - 36.0 g/dL    RDW 12.3 11.0 - 14.5 %    Platelets 228 140 - 440 thou/uL    MPV 7.2 7.0 - 10.0 fL   Electrocardiogram Perform - Clinic   Result Value Ref Range    SYSTOLIC BLOOD PRESSURE  mmHg    DIASTOLIC BLOOD PRESSURE  mmHg    VENTRICULAR RATE 53 BPM    ATRIAL RATE 53 BPM    P-R INTERVAL 250 ms    QRS DURATION 90 ms    Q-T INTERVAL 438 ms    QTC CALCULATION (BEZET) 410 ms    P Axis 74 degrees    R AXIS 48 degrees    T AXIS 84 degrees    MUSE DIAGNOSIS       Sinus bradycardia with 1st degree A-V block  Otherwise normal ECG  When compared with ECG of 27-JUN-2015 00:40,  No significant change was found  Confirmed by DEDRICK FERNANDO [facility] LOC:[facility] ([id number]) on 7/24/2017 11:35:21 AM         Assessment and Plan:  Richie Gordillo was seen in preoperative consultation in preparation for abscess removal surgery.  This is a intermediate risk surgery and the patient has no increased risk for major cardiac complications based on exam and history and appears to be medically stable and an acceptable candidate for the proposed surgery/procedure with appropriate anesthesia.    I have recommended the following medication adjustments preoperatively:    Patient Instructions     Hold aspirin and plavix until after surgery.    Hold your losartan the AM of surgery and take the rest of your medications with a small sip of water.      Also discussed during this visit:    1. Preop exam for internal medicine  Have him hold his Plavix and aspirin until after surgery.  - Basic Metabolic Panel  - HM2(CBC w/o Differential)  - Electrocardiogram Perform - Clinic    2. Abscess of  buttock  Definitive treatment per Dr. Fernandez.    3. Essential hypertension with goal blood pressure less than 140/90  Pressure is stable.    4.  Hyponatremia  Sodium level has returned back low, will have him stop the hydrochlorothiazide in his losartan and change him to plain losartan and eat high salt foods over the next couple days.  He should have a repeat sodium level drawn the morning of surgery and they should use normal saline for IV fluids during the surgery.    Linnette Ziegler MD  Internal Medicine  [facility]  Contact me at [phone]    Much or all of the text in this note was generated through the use of Dragon Dictate voice-to-text software. Errors in spelling or words which seem out of context are unintentional. Sound alike errors, in particular, may have escaped editing.

## 2021-06-12 NOTE — PROGRESS NOTES
"[facility] Clinic Follow Up Note    Richie Gordillo   76 y.o. male    Date of Visit: 9/12/2017    Chief Complaint   Patient presents with     Follow-up     Bronchitis     Subjective  Richie comes in today to follow-up recent diagnosis of bronchitis.  He was seen at the walk-in clinic and was prescribed doxycycline.  He feels he is much better but still has a lot of mucus production and a lot of mucus from his nose.  He has now once again, quit smoking and would like something to help with the cravings.  He has taken Chantix in the past with success.  He denies any wheezing.    ROS A comprehensive review of systems was performed and was otherwise negative    Social History:   Social History     Social History Narrative    He is a retired  and last worked at [employer]. He also was a dental technician. He is  and has 3 children and 8 grandchildren.       Medications were reconciled.  Allergies, social and family history, and the problem list were all reviewed and updated.    Old records reviewed: Walk-in clinic records, recent chest x-ray results consistent with COPD    Exam  General Appearance: Pleasant and alert  Vitals:    09/12/17 1135   BP: 136/82   Pulse: 66   Resp: 12   Weight: 150 lb (68 kg)   Height: 5' 9\" (1.753 m)      Body mass index is 22.15 kg/(m^2).  Wt Readings from Last 3 Encounters:   09/12/17 150 lb (68 kg)   08/27/17 153 lb 9 oz (69.7 kg)   07/27/17 148 lb (67.1 kg)     HEENT: Sclera are clear.   Lungs: Normal respirations  Cardiac: Regular rate and rhythm  Abdomen: Soft and nondistended  Extremities: No edema  Skin: No rashes  Neuro: Moves all extremities and has facial symmetry  Gait: Ambulates with a normal gait    Assessment/Plan  1. Bronchitis  Seems to have recovered.  He is told to watch the color of his phlegm and let us know if he needs anything different.    2. Essential hypertension with goal blood pressure less than 140/90  Blood pressure is currently " stable.  - Basic Metabolic Panel  - HM2(CBC w/o Differential)    3. Rhinitis  We will try him on ipratropium nasal spray.          Linnette Ziegler MD  9/12/2017    Much or all of the text in this note was generated through the use of Dragon Dictate voice-to-text software. Errors in spelling or words which seem out of context are unintentional. Sound alike errors, in particular, may have escaped editing.

## 2021-06-12 NOTE — ANESTHESIA POSTPROCEDURE EVALUATION
Patient: Richie Gordillo  EXCISION LEFT POSTERIOR LEG MASS  Anesthesia type: MAC    Patient location: Phase II Recovery  Last vitals:   Vitals:    07/27/17 1130   BP: 127/60   Pulse: (!) 54   Resp: 16   Temp:    SpO2: 93%     Post vital signs: stable  Level of consciousness: awake and responds to simple questions  Post-anesthesia pain: pain controlled  Post-anesthesia nausea and vomiting: no  Pulmonary: unassisted, return to baseline  Cardiovascular: stable and blood pressure at baseline  Hydration: adequate  Anesthetic events: no    QCDR Measures:  ASA# 11 - Angie-op Cardiac Arrest: ASA11B - Patient did NOT experience unanticipated cardiac arrest  ASA# 12 - Angie-op Mortality Rate: ASA12B - Patient did NOT die  ASA# 13 - PACU Re-Intubation Rate: NA - No ETT / LMA used for case  ASA# 10 - Composite Anes Safety: ASA10A - No serious adverse event  ASA# 38 - New Corneal Injury: ASA38A - No new exposure keratitis or corneal abrasion in PACU    Additional Notes:

## 2021-06-12 NOTE — PROGRESS NOTES
SUBJECTIVE:   Richie Gordillo is a 76 y.o. male comes in for evaluation of a cough that started a week ago.  He is now feeling short of breath and this is worse when lying down.  He has not had a fever.  No sore throat.  He has been eating and drinking well.  He continues to smoke.     OBJECTIVE:  /72 (Patient Site: Right Arm, Patient Position: Sitting, Cuff Size: Adult Regular)  Pulse 62  Temp 98.2  F (36.8  C) (Oral)   Resp 20  Wt 153 lb 9 oz (69.7 kg)  SpO2 93%  BMI 22.68 kg/m2  Appearance: mildly ill and smells of smoke.   ENT- ENT exam normal, no neck nodes or sinus tenderness.   LUNGS: expiratory rhonchi and occasional exp wheezes  CV: Regular and not tachy.  SKIN: Warm, dry with good color.    Studies: CXR: I reviewed the images.  I do not see any focal pneumonia.      ASSESSMENT:   bronchitis   Smoker    PLAN:    Patient Instructions     I do not appreciate any pneumonia on the x-ray.  This will be reviewed by the radiologist.      I will treat you for a bacterial bronchitis with an antibiotic.    Follow-up he should develop a fever or your shortness of breath worsens.    You may take you another week or 2 to get better.       Medications Ordered   Medications     doxycycline (VIBRA-TABS) 100 MG tablet     Sig: Take 1 tablet (100 mg total) by mouth 2 (two) times a day for 10 days.     Dispense:  20 tablet     Refill:  0

## 2021-06-12 NOTE — ANESTHESIA CARE TRANSFER NOTE
Last vitals:   Vitals:    07/27/17 1119   BP: 92/53   Pulse: 60   Resp: 16   Temp: 36.4  C (97.6  F)   SpO2: 98%     Patient's level of consciousness is awake  Spontaneous respirations: yes  Maintains airway independently: yes  Dentition unchanged: yes  Oropharynx: oropharynx clear of all foreign objects    QCDR Measures:  ASA# 20 - Surgical No Data Recorded  PQRS# 430 - Adult PONV Prevention: 4558F - Pt received => 2 anti-emetic agents (different classes) preop & intraop  ASA# 8 - Peds PONV Prevention: NA - Not pediatric patient, not GA or 2 or more risk factors NOT present  PQRS# 424 - Angie-op Temp Management: 4559F - At least one body temp DOCUMENTED => 35.5C or 95.9F within required timeframe  PQRS# 426 - PACU Transfer Protocol:NA - Patient did not go to PACU  ASA# 14 - Acute Post-op Pain: ASA14B - Patient did NOT experience pain >= 7 out of 10

## 2021-06-12 NOTE — ANESTHESIA PREPROCEDURE EVALUATION
Anesthesia Evaluation      Patient summary reviewed   No history of anesthetic complications     Airway   Mallampati: II  Neck ROM: full   Pulmonary - normal exam   (+) sleep apnea, a smoker                         Cardiovascular   Exercise tolerance: > or = 4 METS  (+) hypertension, valvular problems/murmurs, , PVD    ECG reviewed  Rhythm: regular  Rate: normal,    murmur Location:upper left sternal border      Neuro/Psych    (+) neuromuscular disease,  CVA ,     Endo/Other    (+) arthritis,      GI/Hepatic/Renal    (+)   chronic renal disease CRI,      Other findings:   Alcohol Abuse - In Remission    Chronic Renal Insufficiency    Hyperlipidemia    Hypertension    Peripheral Vascular Disease    Homocysteinemia    Obstructive Sleep Apnea    Familial (Benign Essential) Tremor    Renal Tubular Acidosis    Benign Prostatic Hypertrophy    Benign Polyps Of The Large Intestine    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  ASA 4     Anesthetic plan and risks discussed with: patient    Post-op plan: routine recovery

## 2021-06-13 NOTE — TELEPHONE ENCOUNTER
Left foot swollen and sore.  Started getting swollen yesterday.    Foot I also  Has numbness.  The arch is more swollen.  Pain is a 7  On scale of 1-10    Swelling does not seem to be moving up from foot into ankle  Foot is normal color  Limping on foot, he reports.    Advised in person appointment with PCP today.    Cyndie Duran RN  [facility] Triage/refill nurse    Reason for Disposition    Swollen foot (EXCEPTIONs: localized bump from bunions, calluses, insect bite, sting)    Numbness in one foot (i.e., loss of sensation)    SEVERE pain (e.g., excruciating, unable to do any normal activities)    Additional Information    Negative: Followed an ankle or foot injury    Negative: Ankle pain is the main symptom    Negative: Entire foot is cool or blue in comparison to other foot    Negative: Purple or black skin on foot or toe    Negative: Red area or streak and fever    Negative: Swollen foot and fever    Negative: Patient sounds very sick or weak to the triager    Negative: Looks like a boil, infected sore, deep ulcer, or other infected rash (spreading redness, pus)    Protocols used: FOOT PAIN-A-OH

## 2021-06-13 NOTE — TELEPHONE ENCOUNTER
Refill Approved    Rx renewed per Medication Renewal Policy. Medication was last renewed on 8/7/19.    Kendra Zarate, [facility] Connection Triage/Med Refill 12/21/2020     Requested Prescriptions   Pending Prescriptions Disp Refills     SPIRIVA WITH HANDIHALER 18 mcg inhalation capsule [Pharmacy Med Name: SPIRIVA HANDIHALER CAPS 18MCG] 90 capsule 3     Sig: USE AS INSTRUCTED BY YOUR PRESCRIBER       Ipratropium/Tiotropium/Umeclidinium Refill Protocol Passed - 12/18/2020  9:34 AM        Passed - PCP or prescribing provider visit in last 6 months     Last office visit with prescriber/PCP: 8/7/2020 OR same dept: 12/3/2020 Dinah Nevarez CNP OR same specialty: 12/3/2020 Dinah Nevarez CNP Last physical: Visit date not found Last MTM visit: Visit date not found     Next appt within 3 mo: Visit date not found  Next physical within 3 mo: Visit date not found  Prescriber OR PCP: Justo Tom MD  Last diagnosis associated with med order: 1. COPD (chronic obstructive pulmonary disease) (H)  - SPIRIVA WITH HANDIHALER 18 mcg inhalation capsule [Pharmacy Med Name: SPIRIVA HANDIHALER CAPS 18MCG]; USE AS INSTRUCTED BY YOUR PRESCRIBER  Dispense: 90 capsule; Refill: 3    If protocol passes may refill for 6 months if within 3 months of last provider visit (or a total of 9 months).

## 2021-06-14 NOTE — TELEPHONE ENCOUNTER
Reason for Call:  Med Refill    Do you use a [facility]?  Name of the pharmacy and phone number for the current request: No- [facility]    Name of the medication requested: Furosemide 40mg     Other request: Pharm needs refill, pt only has 6 pills left. Would like this to be done today.     Can we leave a detailed message on this number? Yes    Phone number patient can be reached at: Home number on file [phone] (home)    Best Time: anytime    Call taken on 12/23/2020 at 1:48 PM by Elsa Davis

## 2021-06-14 NOTE — TELEPHONE ENCOUNTER
Reason for Call:  medication refill:    Do you use a [facility]?  Name of the pharmacy and phone number for the current request: [facility]    Name of the medication requested: Atorvastatin 40mg, Sotalol 80mg, Sprivia    Other request: Pt would like 90 day supplies, this is a new pharm     Can we leave a detailed message on this number? Yes    Phone number patient can be reached at: Home number on file [phone] (home)    Best Time: anytime    Call taken on 1/5/2021 at 12:05 PM by Elsa Davis

## 2021-06-14 NOTE — PATIENT INSTRUCTIONS - HE
Richie Gordillo,    It was a pleasure to see you today at [facility].     My recommendations after this visit include:  - Please follow up with Dr Perez January 7  - Please follow up with Kika Perez in 2 months  - Please start lisinopril 5 mg daily and monitor your blood pressure at home    Kika Perez, CNP

## 2021-06-14 NOTE — TELEPHONE ENCOUNTER
"Reason for Call:  Other prescription     Detailed comments: Pharmacist calling about Spiriva inhaler. Per pharmacist, inhaler is not on a \"as needed\" basis. Needs to be changed to daily to be covered by insurance. Please send in new order with correct signature.     Phone Number Patient can be reached at: Other phone number:  [phone]    Best Time: Any    Can we leave a detailed message on this number?: Yes    Call taken on 12/29/2020 at 9:35 AM by Evelyn Jimenez      "

## 2021-06-15 PROBLEM — E87.1 HYPONATREMIA: Chronic | Status: ACTIVE | Noted: 2017-04-06

## 2021-06-15 NOTE — TELEPHONE ENCOUNTER
Reason for Call:  medication refill:    Do you use a [facility]?  Name of the pharmacy and phone number for the current request: [facility]    Name of the medication requested: Tamsulosin- please send to local [facility] on [address]    Furosemide- Please send to [facility]    Other request: no    Can we leave a detailed message on this number? Yes    Phone number patient can be reached at: Home number on file [phone] (home)    Best Time: anytime    Call taken on 3/12/2021 at 9:03 AM by Elsa Davis

## 2021-06-15 NOTE — PATIENT INSTRUCTIONS - HE
Reduce lisinopril down to 2.5 mg a day.  Cut pills in half.  You can request a new prescription for 2.5 mg size lisinopril tablets in the future, if you wish.  Goal blood pressure 120-140/70-85    Nonfasting follow-up appointment for blood pressure in 2 to 3 months.    I will plan to have you proceed with the planned colonoscopy this spring.    You are due to see your dermatologist for routine skin cancer follow-up.    You will be due for a screening chest CT scan in March, but that can wait until your next visit.    I will plan to have you repeat your heart echo to follow-up on aortic valve stenosis this spring.

## 2021-06-15 NOTE — PROGRESS NOTES
[facility] clinic Follow Up Note    Richie Gordillo   80 y.o. male    Date of Visit: 2/17/2021    Chief Complaint   Patient presents with     Follow-up     1 week recheck on blood pressure and kidney labs     Subjective  Richie is here for follow-up on hypertension chronic renal insufficiency.    Cardiology started him on 5 mg of lisinopril on December 29.  His blood pressure was 150/66 at that time.    On February 9 in follow-up in clinic patient had kidney labs checked with a creatinine significantly elevated at 2.4 with potassium of 5.5.    His baseline creatinine is 1.6.    History of chronic renal insufficiency consistent with nephrosclerosis.    He has severe arterial disease.  Previous right iliac stent but no claudication.    Bilateral carotid artery stenosis 50-69%.  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  He had his right eyeball removed in 2019.  2015 MRI with cerebellar infarcts.    He does have sleep apnea but does not tolerate CPAP.  History of chronic alcohol in remission.    He has started smoking again recently.  March 2020 chest CT scan negative.  No change in cough or new cough.  No mouth sores or swallowing difficulty.    With elevated creatinine and potassium, patient was told to stop lisinopril earlier this month.  He has not checked blood pressure on his own.    Patient now tells me he was having significant orthostasis after exercise in late January, not resolved a few days after stopping lisinopril.    He has paroxysmal atrial fibrillation on sotalol 80 mg twice daily.  On Xarelto as well as aspirin 81 mg.    There is been no bleeding issues or falls.    No chest pain or chest pressure.  January 2021 stress test negative for ischemia or infarct.    December 2019 heart echo showed mild to moderate aortic stenosis with mild LVH.    BPH stable on Flomax, post TURP.    Past history of squamous cell cancer last saw dermatology January 2020, he has not made  his dermatology follow-up appointment.  No new skin lesions noted.    Past history of a flat sessile polyp October 2018, he is overdue for his 1 year follow-up colonoscopy.  Bowels are regular no blood in stool.    PMHx:    Past Medical History:   Diagnosis Date     Arthritis      Atrial fibrillation (H)      BPH (benign prostatic hyperplasia)      Central retinal artery occlusion, right eye      Chronic kidney disease     CKD     Essential tremor      History of alcohol abuse     in remission     HTN (hypertension)      Hyperlipidemia      Hyponatremia      Leg mass, left      Murmur      PVD (peripheral vascular disease) (H)      Renal Tubular Acidosis      secondary to bladder outlet obstruction; resolved       Sleep apnea      Stroke (H)     TIA     PSHx:    Past Surgical History:   Procedure Laterality Date     CARDIOVERSION  01/30/2020     CATARACT EXTRACTION       EXTERNAL EAR SURGERY      mastoid     EYE SURGERY       ILIAC ARTERY STENT Right     twice, 2009 and 2010     LEG SKIN LESION  BIOPSY / EXCISION Left 7/27/2017    Procedure: EXCISION LEFT POSTERIOR LEG MASS;  Surgeon: Joseluis Fernandez MD;  Location: [facility];  Service:      ID TRANSURETHRAL ELEC-SURG PROSTATECTOM      Description: Transurethral Resection Of Prostate (TURP);  Recorded: 04/27/2009;     Immunizations:   Immunization History   Administered Date(s) Administered     COVID-19,PF,Pfizer 02/10/2021     DT (pediatric) 09/21/2005     Influenza high dose,seasonal,PF, 65+ yrs 02/13/2019, 09/26/2019     Influenza, inj, historic,unspecified 11/14/2011     Influenza, seasonal,quad inj 6-35 mos 01/14/2013     Pneumo Conj 13-V (2010&after) 04/06/2017     Pneumo Polysac 23-V 03/03/2009     Td,adult,historic,unspecified 09/21/2005     Tdap 09/28/2013     ZOSTER, LIVE 12/08/2011       ROS A comprehensive review of systems was performed and was otherwise negative    Medications, allergies, and problem list were reviewed and updated    Exam  BP  154/58 (Patient Site: Left Arm, Patient Position: Sitting, Cuff Size: Adult Regular)   Pulse 60   Temp 97.6  F (36.4  C) (Other) Comment (Src): forehead  Wt 139 lb (63 kg)   BMI 21.13 kg/m    Heart is regular today.  I did not hear murmur.  He had a previous 2 out of 6 systolic murmur noted.  No ankle edema.    Blood pressure recheck on the left arm was 140/60 on my check.    Assessment/Plan  1. Hypertension  Systolic hypertension consistent with severe stiff artery disease.  Low diastolic.    With his severe artery disease, avoid hypotension.  He did not tolerate addition of lisinopril and may have some element of renal artery stenosis.    Goal systolic less than 150.  He has a low diastolic.    Continue Lasix 40 mg once a day.  Could increase that to twice a day if needed in the future for better blood pressure control.    Continue amlodipine 10 mg a day.  Edema is not an issue.    He will not be able to use ACE inhibitor or ARB medication in the future.    2. Stage 3 chronic kidney disease, unspecified whether stage 3a or 3b CKD  Significant worsening creatinine on lisinopril.  That has been discontinued.  Recheck kidney labs.  - Basic Metabolic Panel    3. Peripheral vascular disease (H)  Severe.  No new claudication.  He was having some orthostasis on lisinopril.    Medical management of his carotid artery stenosis.    4. Obstructive Sleep Apnea  Does not tolerate CPAP.  Alcohol use in remission    5. Paroxysmal atrial fibrillation (H)  No recent recurrence.  Sotalol.  Xarelto in addition to low-dose aspirin    6. History of colonic polyps  Patient does wish to proceed with follow-up colonoscopy.  We will discuss that at his spring follow-up appointment.  He will need to hold Xarelto, but I would have him continue on the aspirin.    With history of sleep apnea at age, GI may wish to have him do the colonoscopy in the hospital.    7. History of tobacco use  I did  patient to quit smoking again.   Patient does wish to consider CT scan in the spring.  Spiriva.    He has received his first COVID-19 vaccine    8. Benign prostatic hyperplasia, unspecified whether lower urinary tract symptoms present  Controlled post TURP.  On Flomax.    Mild to moderate aortic stenosis in 2019.  Plan recheck in the spring echo.    History of squamous cell cancer, due for dermatology follow-up in the spring.    Return in about 2 months (around 4/17/2021) for Recheck.   Patient Instructions   Stay off lisinopril.  Continue current medications.    Follow-up in April for general recheck.    I will plan to have you proceed with colonoscopy in the spring.  I will also have you plan to repeat your heart echo and chest CT scan in the spring.    Justo Tom MD        Current Outpatient Medications   Medication Sig Dispense Refill     amLODIPine (NORVASC) 10 MG tablet Take 1 tablet (10 mg total) by mouth daily. 90 tablet 3     aspirin 81 MG EC tablet Take 1 tablet (81 mg total) by mouth daily. 90 tablet 3     atorvastatin (LIPITOR) 40 MG tablet TAKE 1 TABLET DAILY (THIS REPLACES SIMVASTATIN) 90 tablet 3     cholecalciferol, vitamin D3, 400 unit Tab Take 400 Units by mouth daily.       cyanocobalamin (VITAMIN B-12) 500 MCG tablet Take 500 mcg by mouth daily.       ferrous gluconate 256 mg (28 mg iron) Tab Take 1 tablet by mouth daily.       furosemide (LASIX) 40 MG tablet Take 1 tablet (40 mg total) by mouth daily. 90 tablet 3     mirtazapine (REMERON) 30 MG tablet Take 1 tablet (30 mg total) by mouth at bedtime. 90 tablet 3     multivitamin therapeutic (THERAGRAN) tablet Take 1 tablet by mouth every evening.        omega-3 fatty acids-vitamin E (FISH OIL) 1,000 mg cap Take 1,000 mg by mouth daily.        sotaloL (BETAPACE) 80 MG tablet Take 1 tablet (80 mg total) by mouth 2 (two) times a day. 180 tablet 3     tamsulosin (FLOMAX) 0.4 mg cap Take 1 capsule (0.4 mg total) by mouth daily. 90 capsule 3     tiotropium (SPIRIVA WITH  HANDIHALER) 18 mcg inhalation capsule Place 1 capsule (2 puffs total) into inhaler and inhale daily. Close and press button to crush and then inhale in 2 breaths. 90 capsule 3     vitamin E acetate (VITAMIN E ORAL) Take 1 tablet by mouth daily.       XARELTO 15 mg tablet TAKE 1 TABLET DAILY WITH SUPPER 90 tablet 0     No current facility-administered medications for this visit.      Allergies   Allergen Reactions     Ofloxacin Hives     Quinolones Itching     Social History     Tobacco Use     Smoking status: Former Smoker     Packs/day: 0.50     Types: Cigarettes     Start date: 1962     Quit date: 2021     Years since quittin.0     Smokeless tobacco: Never Used     Tobacco comment: Quit 21 and restarted 22, and quit again on 23   Substance Use Topics     Alcohol use: No     Comment: hx alcohol abuse in remission     Drug use: No

## 2021-06-15 NOTE — PROGRESS NOTES
[facility] clinic Follow Up Note    Richie Gordillo   80 y.o. male    Date of Visit: 2/9/2021    Chief Complaint   Patient presents with     Follow-up     Subjective  Richie is here for follow-up of multiple medical issues.    History of hypertension and chronic renal insufficiency.  His creatinine had been 1.5-1.6 at baseline.  Creatinine last measured December last year was 1.6.    I did review the cardiology note from December 29 with a blood pressure of 160/60 and he was started on lisinopril 5 mg a day.  Blood pressure January 7 was 150/66.    Patient states that at home his blood pressures been running in the 120s over 50s and he does experience some mild orthostasis since adding the lisinopril.    Last August his blood pressure was 138/68 before adding lisinopril.    He still on amlodipine 10 mg a day and Lasix 40 mg a day.    He has significant peripheral vascular disease with previous right iliac stent.  Bilateral carotid artery stenosis 50-69%.  He did have a right retinal artery ischemic event in 2015 causing blindness.  Also 2015 MRI did show some chronic cerebellar infarcts.  MRA showed some plaque but no high-grade stenosis in 2015..    New onset atrial fibrillation December 2019.  Paroxysmal atrial fibrillation on sotalol twice daily.  On Xarelto, plus low-dose aspirin.    No history of stroke.    No chest pain or chest pressure.  He was having some mild dyspnea on exertion when he saw cardiology in December.  I did review the nuclear medicine cardiac stress test from January 2021.  Negative for ischemia negative for infarct.  Ejection fraction 58%.    No leg claudication.    No syncope.  December 2019 heart echo showed mild LVH with mild to moderate aortic stenosis.    History of sleep apnea but does not tolerate CPAP.    Patient has been smoking on and off but again quit last month.  No change in cough or new swallowing problems.    March 2020 chest CT scan was negative for nodule or  mass.    2019 he had his right eyeball removed.    He had some left foot pain in December with negative x-ray except for a heel spur and some moderate osteoarthritis.  But he states the foot pain is better.  He is not using any NSAIDs.    BPH controlled with Flomax.  Status post TURP.  PSA was 0.6 in 2019 with no plans to recheck because of age.    On Spiriva inhaler.    Bowels are normal no blood in stool.  No abdominal pain complaints.  He had a flat sessile polyp October 2018 with a 1 year follow-up plan, but he did not follow-up for colonoscopy as planned.    Past history of squamous cell cancer, last saw dermatology in January of a year ago.  He denies any new skin issues.    PMHx:    Past Medical History:   Diagnosis Date     Arthritis      Atrial fibrillation (H)      BPH (benign prostatic hyperplasia)      Central retinal artery occlusion, right eye      Chronic kidney disease     CKD     Essential tremor      History of alcohol abuse     in remission     HTN (hypertension)      Hyperlipidemia      Hyponatremia      Leg mass, left      Murmur      PVD (peripheral vascular disease) (H)      Renal Tubular Acidosis      secondary to bladder outlet obstruction; resolved       Sleep apnea      Stroke (H)     TIA     PSHx:    Past Surgical History:   Procedure Laterality Date     CARDIOVERSION  01/30/2020     CATARACT EXTRACTION       EXTERNAL EAR SURGERY      mastoid     EYE SURGERY       ILIAC ARTERY STENT Right     twice, 2009 and 2010     LEG SKIN LESION  BIOPSY / EXCISION Left 7/27/2017    Procedure: EXCISION LEFT POSTERIOR LEG MASS;  Surgeon: Joseluis Fernandez MD;  Location: [facility];  Service:      CO TRANSURETHRAL ELEC-SURG PROSTATECTOM      Description: Transurethral Resection Of Prostate (TURP);  Recorded: 04/27/2009;     Immunizations:   Immunization History   Administered Date(s) Administered     DT (pediatric) 09/21/2005     Influenza high dose,seasonal,PF, 65+ yrs 02/13/2019, 09/26/2019      Influenza, inj, historic,unspecified 11/14/2011     Influenza, seasonal,quad inj 6-35 mos 01/14/2013     Pneumo Conj 13-V (2010&after) 04/06/2017     Pneumo Polysac 23-V 03/03/2009     Td,adult,historic,unspecified 09/21/2005     Tdap 09/28/2013     ZOSTER, LIVE 12/08/2011       ROS A comprehensive review of systems was performed and was otherwise negative    Medications, allergies, and problem list were reviewed and updated    Exam  /54 (Patient Site: Left Arm, Patient Position: Sitting, Cuff Size: Adult Regular)   Pulse 60   Temp (!) 96.4  F (35.8  C) (Other) Comment (Src): forehead  Wt 137 lb (62.1 kg)   BMI 20.83 kg/m    Alert and oriented.  Right eyeball prosthesis.  Lungs are clear to auscultation without wheezing or crackles.  Just mildly reduced breath sounds.  Heart is regular, I did not hear a systolic murmur that had been noticed in the past.  No ankle edema.  Abdomen nonobese nontender.  It with a clamp on exam table, nonfocal neurologic exam.    Assessment/Plan  1. Hypertension  Blood pressure on the low side.  Cymbalta orthostasis.  This has occurred since cardiology added lisinopril 5 mg a day approximately 6 weeks ago.    Reduce lisinopril down to 2.5 mg a day by cutting pill in half.  If significant worsening creatinine level will need that rechecked, to determine if you will able to stay on the lisinopril.    Continue amlodipine and furosemide.    Follow-up in 2 to 3 months.  - amLODIPine (NORVASC) 10 MG tablet; Take 1 tablet (10 mg total) by mouth daily.  Dispense: 90 tablet; Refill: 3  - lisinopriL (PRINIVIL,ZESTRIL) 5 MG tablet; Take 0.5 tablets (2.5 mg total) by mouth daily.  Dispense: 90 tablet; Refill: 3    2. Chronic renal insufficiency, stage 3 (moderate)  As above.  Suspected previous NSAID use or nephrosclerosis.  Not using significant NSAIDs now.    3. Insomnia, unspecified type  Refill given.  - mirtazapine (REMERON) 30 MG tablet; Take 1 tablet (30 mg total) by mouth at  bedtime.  Dispense: 90 tablet; Refill: 3  PHQ-9 score today of 2  4. Carotid artery stenosis, asymptomatic, bilateral  Asymptomatic.  Medical management with Xarelto, aspirin and Lipitor 40 mg.    5. Paroxysmal atrial fibrillation (H)  Regular rhythm now.  Sotalol twice daily.  On Xarelto.  History of sleep apnea but does not tolerate CPAP.  Alcohol is in remission  6. Aortic valve stenosis, etiology of cardiac valve disease unspecified  I would plan to repeat heart echo in the spring.  Currently asymptomatic.    Mild LVH in 2019.  No evidence of clinical CHF at this time.  History of sleep apnea but does not tolerate CPAP.  Alcohol use in remission.    7. History of colonic polyps  Patient wishes to wait on colonoscopy until after he completes COVID-19 vaccine.    Given history of vascular disease and his atrial fibrillation, patient may need to have the colonoscopy the hospital.  Consider GI consult in the spring.    Previous plan was to be off Xarelto for 3 days and off aspirin for just 1 day.    He had a flat sessile polyp in October 2018, with a tattoo.  1 year follow-up was the plan that he is overdue for.  - HM2(CBC w/o Differential)    8. History of squamous cell carcinoma of skin  He defers 1 year skin check.  No new skin lesions noted currently.  He wants to wait until the spring.    9. History of tobacco use  He did quit smoking again last month.  No change in cough.  On Spiriva.    Mild dyspnea on exertion consistent with tobacco use.  I did review the nuclear medicine stress test from last month that was negative for ischemia.    You will be due for screening chest CT scan in March.  He will plan to do a wait until spring to do that.    10. Encounter for therapeutic drug monitoring    - Comprehensive Metabolic Panel  - HM2(CBC w/o Differential)    11. Hypercholesterolemia  Lipitor 40 mg.  Goal LDL less than 80.  - Lipid Cascade    BPH controlled with Flomax.  No further PSA checks planned.    Patient  was scheduled for the Covid vaccine with assistance of our staff today.    He did not get the flu shot and does not wish to get at this time.    Left foot pain with heel spur, better.  Wear good orthotic shoes.    He will be due for his adult wellness visit physical this summer.    He used to live in [place] and [facility] in [place].  Now living here granddaughter and his daughter in [place].  Lives with wife.  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    Return in about 3 months (around 5/9/2021) for Recheck.   Patient Instructions   Reduce lisinopril down to 2.5 mg a day.  Cut pills in half.  You can request a new prescription for 2.5 mg size lisinopril tablets in the future, if you wish.  Goal blood pressure 120-140/70-85    Nonfasting follow-up appointment for blood pressure in 2 to 3 months.    I will plan to have you proceed with the planned colonoscopy this spring.    You are due to see your dermatologist for routine skin cancer follow-up.    You will be due for a screening chest CT scan in March, but that can wait until your next visit.    I will plan to have you repeat your heart echo to follow-up on aortic valve stenosis this spring.        Justo Tom MD        Current Outpatient Medications   Medication Sig Dispense Refill     amLODIPine (NORVASC) 10 MG tablet Take 1 tablet (10 mg total) by mouth daily. 90 tablet 3     aspirin 81 MG EC tablet Take 1 tablet (81 mg total) by mouth daily. 90 tablet 3     atorvastatin (LIPITOR) 40 MG tablet TAKE 1 TABLET DAILY (THIS REPLACES SIMVASTATIN) 90 tablet 3     cholecalciferol, vitamin D3, 400 unit Tab Take 400 Units by mouth daily.       cyanocobalamin (VITAMIN B-12) 500 MCG tablet Take 500 mcg by mouth daily.       ferrous gluconate 256 mg (28 mg iron) Tab Take 1 tablet by mouth daily.       furosemide (LASIX) 40 MG tablet Take 1 tablet (40 mg total) by mouth daily. 90 tablet 3     lisinopriL (PRINIVIL,ZESTRIL) 5 MG tablet Take 0.5 tablets (2.5 mg total) by mouth daily.  90 tablet 3     mirtazapine (REMERON) 30 MG tablet Take 1 tablet (30 mg total) by mouth at bedtime. 90 tablet 3     multivitamin therapeutic (THERAGRAN) tablet Take 1 tablet by mouth every evening.        omega-3 fatty acids-vitamin E (FISH OIL) 1,000 mg cap Take 1,000 mg by mouth daily.        sotaloL (BETAPACE) 80 MG tablet Take 1 tablet (80 mg total) by mouth 2 (two) times a day. 180 tablet 3     tamsulosin (FLOMAX) 0.4 mg cap Take 1 capsule (0.4 mg total) by mouth daily. 90 capsule 3     tiotropium (SPIRIVA WITH HANDIHALER) 18 mcg inhalation capsule Place 1 capsule (2 puffs total) into inhaler and inhale daily. Close and press button to crush and then inhale in 2 breaths. 90 capsule 3     vitamin E acetate (VITAMIN E ORAL) Take 1 tablet by mouth daily.       XARELTO 15 mg tablet TAKE 1 TABLET DAILY WITH SUPPER 90 tablet 0     No current facility-administered medications for this visit.      Allergies   Allergen Reactions     Ofloxacin Hives     Quinolones Itching     Social History     Tobacco Use     Smoking status: Former Smoker     Packs/day: 0.50     Types: Cigarettes     Start date: 1962     Quit date: 2021     Years since quittin.0     Smokeless tobacco: Never Used     Tobacco comment: Quit 21 and restarted 22, and quit again on 21   Substance Use Topics     Alcohol use: No     Comment: hx alcohol abuse in remission     Drug use: No

## 2021-06-15 NOTE — PATIENT INSTRUCTIONS - HE
Stay off lisinopril.  Continue current medications.    Follow-up in April for general recheck.    I will plan to have you proceed with colonoscopy in the spring.  I will also have you plan to repeat your heart echo and chest CT scan in the spring.

## 2021-06-15 NOTE — TELEPHONE ENCOUNTER
The patient was informed of the clinician message and verbalized understanding. Appointment scheduled.

## 2021-06-15 NOTE — TELEPHONE ENCOUNTER
Patient had significant worsening of his kidney function on the lisinopril.  He will need to stop lisinopril entirely and he should not restart ACE inhibitor type medication.    Patient will need to follow-up in clinic next week to recheck kidney labs as well as recheck blood pressure.    Have him schedule with me on February 17 for nonfasting blood pressure follow-up appointment

## 2021-06-16 PROBLEM — J44.9 CHRONIC OBSTRUCTIVE PULMONARY DISEASE, UNSPECIFIED COPD TYPE (H): Status: ACTIVE | Noted: 2021-01-01

## 2021-06-16 PROBLEM — F32.A DEPRESSION: Chronic | Status: ACTIVE | Noted: 2017-10-16

## 2021-06-16 PROBLEM — R23.3 EASY BRUISING: Status: ACTIVE | Noted: 2020-06-29

## 2021-06-16 PROBLEM — I50.33 ACUTE ON CHRONIC DIASTOLIC CONGESTIVE HEART FAILURE (H): Status: ACTIVE | Noted: 2021-01-01

## 2021-06-16 PROBLEM — I35.0 AORTIC STENOSIS, MODERATE: Status: ACTIVE | Noted: 2019-08-20

## 2021-06-16 PROBLEM — I48.19 PERSISTENT ATRIAL FIBRILLATION (H): Status: ACTIVE | Noted: 2019-12-02

## 2021-06-16 PROBLEM — I65.23 CAROTID ARTERY STENOSIS, ASYMPTOMATIC, BILATERAL: Status: ACTIVE | Noted: 2019-08-20

## 2021-06-16 PROBLEM — I50.32 CHRONIC HEART FAILURE WITH PRESERVED EJECTION FRACTION (H): Status: ACTIVE | Noted: 2019-12-17

## 2021-06-16 PROBLEM — I50.9 HEART FAILURE (H): Status: ACTIVE | Noted: 2021-01-01

## 2021-06-16 PROBLEM — Z86.0100 HISTORY OF COLONIC POLYPS: Status: ACTIVE | Noted: 2019-02-13

## 2021-06-16 PROBLEM — Z86.73 HISTORY OF STROKE: Status: ACTIVE | Noted: 2019-02-13

## 2021-06-16 PROBLEM — Z51.81 ENCOUNTER FOR THERAPEUTIC DRUG MONITORING: Status: ACTIVE | Noted: 2019-08-07

## 2021-06-16 PROBLEM — J43.9 PULMONARY EMPHYSEMA, UNSPECIFIED EMPHYSEMA TYPE (H): Chronic | Status: ACTIVE | Noted: 2018-05-10

## 2021-06-16 NOTE — TELEPHONE ENCOUNTER
Reason for Call:  Medication or medication refill:    Do you use a [facility]?  Name of the pharmacy and phone number for the current request: [facility]     Name of the medication requested: atorvastatin (LIPITOR) 40 MG tablet  Last refill: 1.5.2021    Other request: n/a    Can we leave a detailed message on this number? Yes    Phone number patient can be reached at: Home number on file [phone] (home)    Best Time: anytime     Call taken on 4/6/2021 at 8:45 AM by Caitlin Bahena

## 2021-06-16 NOTE — PATIENT INSTRUCTIONS - HE
[facility] will contact you to set up colonoscopy.  Do not take the Xarelto for 2 days before and the day of the colonoscopy.  Continue on your low-dose aspirin.    Do not take furosemide day before or day of colonoscopy.    Do take the sotalol with a sip of water on the morning of colonoscopy.    See dermatology for full body skin exam later this spring, because of your history of squamous cell skin cancer.    I will have you wait until the summer or fall to repeat your heart echo.    Schedule an adult wellness visit physical exam this August.  Come fasting.    Schedule your screening lung CT scan this spring.

## 2021-06-16 NOTE — PROGRESS NOTES
[facility] Follow Up Note    Richie Gordillo   80 y.o. male    Date of Visit: 4/20/2021    Chief Complaint   Patient presents with     Follow-up     Subjective  Richie is a 80-year-old male here for follow-up on multiple medical issues.    History of hypertension chronic renal insufficiency.  Baseline creatinine previously was 1.5-1.75.    Cardiology put him on lisinopril in December last year but he had a significant increase in creatinine up to 2.4 in February.  He was taken off lisinopril and recheck of the creatinine in February was 2.17.    He has not checked blood pressure on his own but denies orthostasis.    He has a history of stiff arteries and severe peripheral vascular disease.    History of paroxysmal atrial fibrillation but asymptomatic on sotalol twice daily.  No bleeding issues on Xarelto.  He does have a history of cerebellar infarction 2015 imaging.    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    He does have bilateral carotid artery stenosis 50-69%    History of right iliac stent, no claudication symptoms currently.    Continues on amlodipine 10 mg a day and Lasix 40 mg a day.  No increasing edema.    He occasionally gets some unsteadiness feeling when he stands up quickly from lying down but not severe.  No fainting spells.    February 2021 LDL 65 and HDL 36.    On Lipitor 40 mg.  No new generalized myalgia complaints.    Patient is on Xarelto and aspirin 81 mg a day.    No chest pain or chest pressure.  January 2021 nuclear stress test was negative for ischemia or infarct.  Ejection fraction 58%.    December 2019 heart echo showed mild to moderate aortic stenosis with mild LVH.    He has a significant smoking history over 30-year pack history, had quit smoking recently but relapsed 1 week ago.  He does plan to quit again.  A CT scan March 2020 - for nodule.  No new cough or change in cough.  No mouth sores or swallowing  difficulty.    History of chronic alcohol but in remission.  History of sleep apnea but does not tolerate CPAP.    History of squamous cell cancer, last saw dermatology January 2020, no new skin lesions noted.  He does not have a dermatology follow-up at this time.    October 2018 he had a flat sessile polyp with a 1 year follow-up plan that he did not follow through with.  Bowels are regular no blood in stool.    Hemoglobin was mildly decreased to 12.5 in February.    No abdominal pain complaints.  Urinating regularly with Flomax, post TURP.    PMHx:    Past Medical History:   Diagnosis Date     Arthritis      Atrial fibrillation (H)      BPH (benign prostatic hyperplasia)      Central retinal artery occlusion, right eye      Chronic kidney disease     CKD     Essential tremor      History of alcohol abuse     in remission     HTN (hypertension)      Hyperlipidemia      Hyponatremia      Leg mass, left      Murmur      PVD (peripheral vascular disease) (H)      Renal Tubular Acidosis      secondary to bladder outlet obstruction; resolved       Sleep apnea      Stroke (H)     TIA     PSHx:    Past Surgical History:   Procedure Laterality Date     CARDIOVERSION  01/30/2020     CATARACT EXTRACTION       EXTERNAL EAR SURGERY      mastoid     EYE SURGERY       ILIAC ARTERY STENT Right     twice, 2009 and 2010     LEG SKIN LESION  BIOPSY / EXCISION Left 7/27/2017    Procedure: EXCISION LEFT POSTERIOR LEG MASS;  Surgeon: Joseluis Fernandez MD;  Location: [facility];  Service:      OR TRANSURETHRAL ELEC-SURG PROSTATECTOM      Description: Transurethral Resection Of Prostate (TURP);  Recorded: 04/27/2009;     Immunizations:   Immunization History   Administered Date(s) Administered     COVID-19,PF,Pfizer 02/10/2021, 03/03/2021     DT (pediatric) 09/21/2005     Influenza high dose,seasonal,PF, 65+ yrs 02/13/2019, 09/26/2019     Influenza, inj, historic,unspecified 11/14/2011     Influenza, seasonal,quad inj 6-35 mos  01/14/2013     Pneumo Conj 13-V (2010&after) 04/06/2017     Pneumo Polysac 23-V 03/03/2009     Td,adult,historic,unspecified 09/21/2005     Tdap 09/28/2013     ZOSTER, LIVE 12/08/2011       ROS A comprehensive review of systems was performed and was otherwise negative    Medications, allergies, and problem list were reviewed and updated    Exam  /60   Pulse 72   Wt 139 lb (63 kg)   BMI 21.13 kg/m    Alert and oriented.  Able to climb on exam table.  Lungs with slight decreased breath sounds throughout but no wheezing or crackles.  No new rhonchi.  No dullness.  No cervical or supraclavicular or axillary adenopathy.  No JVD.  Heart is regular with a 3/6 systolic murmur at the left upper sternal border.  No ankle edema.  Abdomen is thin, nontender and no pedal splenomegaly.  Liver edge is at costal margin without mass or tenderness.  No pulsatile abdominal mass.    Blood pressure was taken by me in left arm and was 130/60, difficulty getting blood pressure right arm, likely from his peripheral artery disease.  Perfusion appears normal in right hand.    Assessment/Plan  1. Essential hypertension, benign  Controlled on check today.  Continue current amlodipine and furosemide.    Could increase furosemide if needed for her blood pressure spike.    2. Chronic renal impairment, stage 3b  Did not tolerate lisinopril.  Recheck labs today.  No NSAIDs except for low-dose aspirin.    3. Paroxysmal atrial fibrillation (H)  Heart regular currently.  Sotalol 80 mg twice daily.    He was told to take sotalol on the morning of his colonoscopy.    See patient instructions for holding Xarelto for colonoscopy.    4. History of tobacco use  Patient plans to quit smoking again.  He does wish to proceed with lung cancer screening and would have intervention if needed for new nodule.  No new respiratory symptoms at this time.  - CT Low Dose Lung Screening Chest; Future    5. Aortic valve stenosis, etiology of cardiac valve disease  unspecified  Patient does not want to recheck his heart echo at this time.  He would consider later this summer or fall.  Just mild to moderate aortic stenosis in 2019 echo.    6. Peripheral vascular disease (H)  Severe, diffuse.  Previous iliac stent but no recurrent claudication.    Carotid artery stenosis history.  Continue medical management with Lipitor and aspirin in addition to Xarelto.    I did discuss with patient that he will be at higher risk for stroke going off Xarelto for his colonoscopy.  I will have him continue on the low-dose aspirin for the colonoscopy.  I did discuss bleeding risk with staying on colonoscopy.  Patient sepsis risk and wishes to proceed with colonoscopy.    No chest pain and does not have a history of coronary disease and had a negative stress test this past January.    7. History of colonic polyps  Previous flat high risk polyp.  Overdue for follow-up colonoscopy.  - Ambulatory referral for Colonoscopy - [facility]    8. History of squamous cell carcinoma of skin  Patient does not wish to see dermatology for follow-up at this time.  He has no concerning skin lesions currently.  Plan to have him follow-up later this spring or summer with dermatology.    9. Encounter for therapeutic drug monitoring    - Comprehensive Metabolic Panel  - HM2(CBC w/o Differential)    He did complete the COVID-19 vaccine series.    History of sleep apnea but does not tolerate CPAP.    Return in about 4 months (around 8/20/2021) for Annual physical.   Patient Instructions   [facility] will contact you to set up colonoscopy.  Do not take the Xarelto for 2 days before and the day of the colonoscopy.  Continue on your low-dose aspirin.    Do not take furosemide day before or day of colonoscopy.    Do take the sotalol with a sip of water on the morning of colonoscopy.    See dermatology for full body skin exam later this spring, because of your history of squamous cell skin  cancer.    I will have you wait until the summer or fall to repeat your heart echo.    Schedule an adult wellness visit physical exam this August.  Come fasting.    Schedule your screening lung CT scan this spring.    Justo Tom MD        Current Outpatient Medications   Medication Sig Dispense Refill     amLODIPine (NORVASC) 10 MG tablet Take 1 tablet (10 mg total) by mouth daily. 90 tablet 3     aspirin 81 MG EC tablet Take 1 tablet (81 mg total) by mouth daily. 90 tablet 3     atorvastatin (LIPITOR) 40 MG tablet TAKE 1 TABLET DAILY (THIS REPLACES SIMVASTATIN) 90 tablet 3     cholecalciferol, vitamin D3, 400 unit Tab Take 400 Units by mouth daily.       cyanocobalamin (VITAMIN B-12) 500 MCG tablet Take 500 mcg by mouth daily.       ferrous gluconate 256 mg (28 mg iron) Tab Take 1 tablet by mouth daily.       furosemide (LASIX) 40 MG tablet Take 1 tablet (40 mg total) by mouth daily. 90 tablet 2     mirtazapine (REMERON) 30 MG tablet Take 1 tablet (30 mg total) by mouth at bedtime. 90 tablet 3     multivitamin therapeutic (THERAGRAN) tablet Take 1 tablet by mouth every evening.        omega-3 fatty acids-vitamin E (FISH OIL) 1,000 mg cap Take 1,000 mg by mouth daily.        rivaroxaban ANTICOAGULANT (XARELTO) 15 mg tablet TAKE 1 TABLET DAILY WITH SUPPER 90 tablet 3     sotaloL (BETAPACE) 80 MG tablet Take 1 tablet (80 mg total) by mouth 2 (two) times a day. 180 tablet 3     tamsulosin (FLOMAX) 0.4 mg cap Take 1 capsule (0.4 mg total) by mouth daily. 90 capsule 3     tiotropium (SPIRIVA WITH HANDIHALER) 18 mcg inhalation capsule Place 1 capsule (2 puffs total) into inhaler and inhale daily. Close and press button to crush and then inhale in 2 breaths. 90 capsule 3     vitamin E acetate (VITAMIN E ORAL) Take 1 tablet by mouth daily.       No current facility-administered medications for this visit.      Allergies   Allergen Reactions     Ofloxacin Hives     Quinolones Itching     Social History     Tobacco Use      Smoking status: Former Smoker     Packs/day: 0.25     Types: Cigarettes     Start date: 1962     Quit date: 2021     Years since quittin.2     Smokeless tobacco: Never Used     Tobacco comment: Quit 21 and restarted 22, and quit again on 21, started again until quitting for 1 week in 2021, and started again on 21   Substance Use Topics     Alcohol use: No     Comment: hx alcohol abuse in remission     Drug use: No

## 2021-06-16 NOTE — TELEPHONE ENCOUNTER
Reason for Call:  Medication or medication refill:    Do you use a [facility]?  Name of the pharmacy and phone number for the current request: [facility]    Name of the medication requested: Xarelto    Other request: NO    Can we leave a detailed message on this number? Yes    Phone number patient can be reached at: Home number on file [phone] (home)    Best Time: anytime    Call taken on 4/1/2021 at 2:15 PM by Elsa Davis

## 2021-06-17 NOTE — PROGRESS NOTES
Assessment & Plan     Acute on chronic diastolic congestive heart failure (H)  Here to follow-up after hospitalization with acute diastolic congestive heart failure.  Presented with increasing shortness of breath and worsening peripheral edema with elevated BNP.  Also component of COPD exacerbation as described below.  Echocardiogram demonstrating normal left ventricular systolic function with EF of 65% but with concentric left ventricular wall thickening.  Mild to moderate aortic stenosis.  Diuresed with IV Lasix and discharged to home after good response.  O2 sats were stable at 94% on room air.  Sent home with increased dose of Lasix 40 mg twice daily for 2 days before resuming previous dose of once daily and was instructed to follow-up with his PCP today.  I am evaluating him in place of his usual PCP Dr. Tom.  He does feel better breathing more comfortably although still with some mild edema.  Tolerating increased dose of diuretic.  Blood pressure is stable.  There was some concern regarding rising creatinine at the time of discharge and this needs to be rechecked.  Unclear precipitating factor.  We discussed the importance of sodium restriction.  He does not use NSAIDs.  He was taking his medications faithfully.  As he will continue prednisone for another couple days which could cause fluid retention, I am recommending that he continue on the increased dose of diuretic taking Lasix twice daily through May 23.  Thereafter, he can resume previous dose of 40 mg once daily.  Most importantly, he should follow up with Dr. Tom in 2 weeks to make sure that he continues to be stable.  He should weigh himself daily at home and notify our office if he gains more than 2 pounds in 24 hours or 5 pounds over the course of the week.  - furosemide (LASIX) 40 MG tablet  Dispense: 90 tablet; Refill: 2    COPD exacerbation (H)  Contributing to his presentation of increased dyspnea was probable COPD exacerbation.  Long  history of chronic tobacco use.  He has been treated with prednisone and will continue 40 mg daily for another 2 days.  He has an albuterol inhaler that he can use as needed and is also completing a course of doxycycline.  Lungs with diffuse rhonchi and diminished breath sounds.  He quit smoking this past Monday and I am encouraging him to continue in this effort.    Persistent atrial fibrillation (H)  He remains anticoagulated with Xarelto and continues on sotalol maintaining normal sinus rhythm    Hypertension  Blood pressure looks adequately controlled with current medication    Stage 3b chronic kidney disease  Renal function has fluctuated over the last year including worsening while on ACE inhibitor.  Creatinine was down to 1.38 at the time of admission but he was also fluid overloaded.  Creatinine up to 1.95 with GFR of 33 at the time of discharge and this will be rechecked today.  He will need close monitoring while diuresing.  - Basic Metabolic Panel               Return in about 2 weeks (around 6/4/2021) for Recheck with Dr Tom.    Preet Lopez MD  [facility]    Subjective       HPI 80-year-old male with hospitalization with increasing shortness of breath and edema secondary to acute diastolic congestive heart failure and COPD exacerbation.  See assessment and plan for details of visit    Current Outpatient Medications on File Prior to Visit   Medication Sig Dispense Refill     albuterol sulfate 90 mcg/actuation AePB Inhale 2 puffs every 4 (four) hours as needed. 1 each 0     amLODIPine (NORVASC) 10 MG tablet Take 1 tablet (10 mg total) by mouth daily. 90 tablet 3     aspirin 81 MG EC tablet Take 1 tablet (81 mg total) by mouth daily. 90 tablet 3     atorvastatin (LIPITOR) 40 MG tablet TAKE 1 TABLET DAILY (THIS REPLACES SIMVASTATIN) 90 tablet 3     cholecalciferol, vitamin D3, 400 unit Tab Take 400 Units by mouth daily.       cyanocobalamin (VITAMIN B-12) 500 MCG  "tablet Take 500 mcg by mouth daily.       doxycycline (VIBRAMYCIN) 100 MG capsule Take 1 capsule (100 mg total) by mouth 2 (two) times a day for 5 days. 10 capsule 0     ferrous gluconate 256 mg (28 mg iron) Tab Take 1 tablet by mouth daily.       mirtazapine (REMERON) 30 MG tablet Take 1 tablet (30 mg total) by mouth at bedtime. 90 tablet 3     multivitamin therapeutic (THERAGRAN) tablet Take 1 tablet by mouth every evening.        omega-3 fatty acids-vitamin E (FISH OIL) 1,000 mg cap Take 1,000 mg by mouth daily.        predniSONE (DELTASONE) 20 MG tablet Take 40 mg by mouth daily with breakfast. 8 tablet 0     rivaroxaban ANTICOAGULANT (XARELTO) 15 mg tablet TAKE 1 TABLET DAILY WITH SUPPER 90 tablet 3     sotaloL (BETAPACE) 80 MG tablet Take 1 tablet (80 mg total) by mouth 2 (two) times a day. 180 tablet 3     tamsulosin (FLOMAX) 0.4 mg cap Take 1 capsule (0.4 mg total) by mouth daily. 90 capsule 3     tiotropium (SPIRIVA WITH HANDIHALER) 18 mcg inhalation capsule Place 1 capsule (2 puffs total) into inhaler and inhale daily. Close and press button to crush and then inhale in 2 breaths. 90 capsule 3     [DISCONTINUED] furosemide (LASIX) 40 MG tablet Take 1 tablet (40 mg total) by mouth daily. Twice daily x 2 days then once daily as previously prescribed 90 tablet 2     No current facility-administered medications on file prior to visit.         Review of Systems  Denies any chest pain  12 point ROS is negative other than what is described in assessment and plan and above      Objective    Vitals:    05/21/21 1338 05/21/21 1418   BP: 152/58 130/58   Patient Site: Left Arm    Patient Position: Sitting    Cuff Size: Adult Regular    Pulse: (!) 57    Temp: 98.2  F (36.8  C)    TempSrc: Tympanic    SpO2: 94%    Weight: 139 lb (63 kg)    Height: 5' 8\" (1.727 m)         Physical Exam  Well-appearing elderly male  Lungs with diminished breath sounds and diffuse rhonchi and scattered wheezing  Heart regular rate and " rhythm with 3/6 systolic murmur  1+ bilateral foot and ankle edema

## 2021-06-17 NOTE — PROGRESS NOTES
ASSESMENT AND PLAN  1. SOB (shortness of breath)  Electrocardiogram Perform and Read   2. Chronic heart failure with preserved ejection fraction (H)        I am concerned patient has a heart failure exacerbation.  No acute ischemia or changes on EKG when compared to 12/2020.  O2 sat 94% and nontachycardic.  7 pound weight gains over the last 27 days.  On Lasix 40 mg.  I recommend he be seen in the ER for further evaluation and treatment.  Considered other diagnoses including MI, PE, among others.  I called ER and discussed patient.    His problem list has both diastolic and systolic heart failure and I am not able to see any recent exacerbations on his chart.  He has been taking his Lasix and other medications as prescribed without missing dosages.  The bilateral leg swelling along with the decreased exercise tolerance and shortness of breath is concerning for this.  He has been had difficulty sleeping and it does improve with sitting in his chair and sleeping.  Faint crackles were heard in the left lower lobe but patient is a smoker.  He did have a nuclear stress test 4 months ago that showed no acute ischemic changes and an ejection fraction of 58%     50 minutes spent on the date of the encounter doing chart review, history and exam, documentation and further activities per the note    Anthony Daniel PA-C    SUBJECTIVE  Chief Complaint   Patient presents with     Leg Swelling     noticed today      Foot Swelling     noticed today      Shortness of Breath     2-3 weeks      HPI:  Richie Gordillo is a 80 y.o. male who has a past medical history of chronic heart failure, A. fib, carotid artery stenosis, emphysema, chronic kidney disease, hyperlipidemia, hypertension, peripheral vascular disease, TIA on chronic anticoagulation and KEISHA who presents today with about 3 weeks of worsening shortness of breath and 1 day of lower extremity bilateral swelling.  Has had difficulty sleeping and waking up in what he describes  as a panic and cannot fall back asleep.  Sleeps better when sitting up.  Has been taking his medications as prescribed.  Reports that his weights have gone up.  No significant chest pain or paresthesias.  No fever, chills.  No muscle weakness, myalgias or significant fatigue.  No cough  ROS:  Pertinent ROS neg other than the symptoms noted above in the HPI.     OBJECTIVE  Vitals:    05/17/21 1445   BP: 173/69   Patient Site: Left Arm   Patient Position: Sitting   Cuff Size: Adult Regular   Pulse: 71   Resp: 16   Temp: 98.9  F (37.2  C)   TempSrc: Oral   SpO2: 94%   Weight: 146 lb (66.2 kg)     Physical Exam:  General: No distress, alert and cooperative  Neck: No JVD appreciated  Pulmonary: Faint crackles in left lower lobe, no wheeze  Cardiac: Holosystolic murmur heard at the right sternal border.  Irregular rhythm  Musculoskeletal: 1+ pitting edema to the mid shin, nontender      Labs:  EKG: Abnormal EKG but no significant change when compared to 12/20.  No signs of acute ischemia  Recent Results (from the past 72 hour(s))   Electrocardiogram Perform and Read   Result Value Ref Range    SYSTOLIC BLOOD PRESSURE      DIASTOLIC BLOOD PRESSURE      VENTRICULAR RATE 66 BPM    ATRIAL RATE 66 BPM    P-R INTERVAL 188 ms    QRS DURATION 78 ms    Q-T INTERVAL 432 ms    QTC CALCULATION (BEZET) 452 ms    P Axis 89 degrees    R AXIS 52 degrees    T AXIS 84 degrees    MUSE DIAGNOSIS       Sinus rhythm with Premature atrial complexes with Aberrant conduction  Possible Lateral infarct , age undetermined  Abnormal ECG  When compared with ECG of 29-DEC-2020 16:10,  No significant change was found         Radiology:  No results found.    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  2015-06: Visual disturbance  Alcohol Abuse - In Remission  Chronic kidney disease, stage III (moderate) (H)  Other hyperlipidemia  Hypertension  Peripheral vascular disease (H)  Homocysteinemia  Obstructive Sleep Apnea  Familial (Benign Essential) Tremor  Renal Tubular Acidosis  Benign Prostatic Hypertrophy  Benign Polyps Of The Large Intestine  Acute systolic heart failure (H)  Bilateral carotid artery stenosis  Acute diastolic congestive heart failure (H)      Past Medical History:   Diagnosis Date     Arthritis      Atrial fibrillation (H)      BPH (benign prostatic hyperplasia)      Central retinal artery occlusion, right eye      Chronic kidney disease     CKD     Essential tremor      History of alcohol abuse     in remission     HTN (hypertension)      Hyperlipidemia      Hyponatremia      Leg mass, left      Murmur      PVD (peripheral vascular disease) (H)      Renal Tubular Acidosis      secondary to bladder outlet obstruction; resolved       Sleep apnea      Stroke (H)     TIA       Current Outpatient Medications on File Prior to Visit   Medication Sig Dispense Refill     amLODIPine (NORVASC) 10 MG tablet Take 1 tablet (10 mg total) by mouth daily. 90 tablet 3     aspirin 81 MG EC tablet Take 1 tablet (81 mg total) by mouth daily. 90 tablet 3     atorvastatin (LIPITOR) 40 MG tablet TAKE 1 TABLET DAILY (THIS REPLACES SIMVASTATIN) 90 tablet 3     cholecalciferol, vitamin D3, 400 unit Tab Take 400 Units by mouth daily.       cyanocobalamin (VITAMIN B-12) 500 MCG tablet Take 500 mcg by mouth daily.       ferrous gluconate 256 mg (28 mg iron) Tab Take 1 tablet by mouth daily.       furosemide (LASIX) 40 MG tablet Take 1 tablet (40 mg total) by mouth daily. 90 tablet 2      mirtazapine (REMERON) 30 MG tablet Take 1 tablet (30 mg total) by mouth at bedtime. 90 tablet 3     multivitamin therapeutic (THERAGRAN) tablet Take 1 tablet by mouth every evening.        omega-3 fatty acids-vitamin E (FISH OIL) 1,000 mg cap Take 1,000 mg by mouth daily.        rivaroxaban ANTICOAGULANT (XARELTO) 15 mg tablet TAKE 1 TABLET DAILY WITH SUPPER 90 tablet 3     sotaloL (BETAPACE) 80 MG tablet Take 1 tablet (80 mg total) by mouth 2 (two) times a day. 180 tablet 3     tamsulosin (FLOMAX) 0.4 mg cap Take 1 capsule (0.4 mg total) by mouth daily. 90 capsule 3     tiotropium (SPIRIVA WITH HANDIHALER) 18 mcg inhalation capsule Place 1 capsule (2 puffs total) into inhaler and inhale daily. Close and press button to crush and then inhale in 2 breaths. 90 capsule 3     vitamin E acetate (VITAMIN E ORAL) Take 1 tablet by mouth daily.       No current facility-administered medications on file prior to visit.         Social History     Tobacco Use     Smoking status: Former Smoker     Packs/day: 0.25     Types: Cigarettes     Start date: 1962     Quit date: 2021     Years since quittin.3     Smokeless tobacco: Never Used     Tobacco comment: Quit 21 and restarted 20, and quit again on 21, started again until quitting for 1 week in 2021, and started again on 21   Substance Use Topics     Alcohol use: No     Comment: hx alcohol abuse in remission       The plan of care was discussed with the patient. They understand and agree with the course of treatment prescribed. A printed summary was given including instructions and medications.  The use of Dragon/[employer] services may have been used to construct the content in this note; any grammatical or spelling errors are non-intentional. Please contact the author of this note directly if you are in need of any clarification.

## 2021-06-17 NOTE — TELEPHONE ENCOUNTER
[facility] CALLING TO GET HOLD ORDERS-    WOULD LIKE A 3 DAY HOLD ON HIS XARELTO, ANY BRIDGING? PT HAS COLONOSCOPY ON 5/12    PLEASE CALL [phone]. FAX- [phone].

## 2021-06-17 NOTE — TELEPHONE ENCOUNTER
Patient has colonoscopy on 5/12, requesting Xarelto hold 3 days per [facility] and [facility] guidelines, pt would be able to restart the day after procedure per this protocol    If unable to give 3 day hold then requesting creatinine clearance after lab values

## 2021-06-17 NOTE — TELEPHONE ENCOUNTER
Okay to hold Xarelto for 3 days prior to endoscopy, restart day after procedure.    Patient will be continuing on aspirin 81 mg a day because of his vascular disease.

## 2021-06-17 NOTE — TELEPHONE ENCOUNTER
[facility] anticoagulation line was left another voice message with the below holding orders. Documentation faxed to them per the other telephone encounter. (This encounter is a duplicate created since 2 [facility] rep's called on the same thing.)

## 2021-06-17 NOTE — TELEPHONE ENCOUNTER
If patient needs immediate assistance, have patient go to ER or urgent care.  Get patient an appointment to be seen in clinic this week with available provider.  I may not have appointments available this week.

## 2021-06-17 NOTE — TELEPHONE ENCOUNTER
Richie is having substantial swelling in legs and feet and shortness of breath.  Noticed swelling today and shortness of breath has been present for two to three weeks.  Shortness of breath is when moving and does not take much movement.  Shortness of breath is something new.  Richie is having troubles sleeping at night also.   Patient is requesting to be seen in clinic today.    COVID 19 Nurse Triage Plan/Patient Instructions    Please be aware that novel coronavirus (COVID-19) may be circulating in the community. If you develop symptoms such as fever, cough, or SOB or if you have concerns about the presence of another infection including coronavirus (COVID-19), please contact your health care provider or visit  [email or web address].    Disposition/Instructions    In-Person Visit with provider recommended. Reference Visit Selection Guide.    Thank you for taking steps to prevent the spread of this virus.  o Limit your contact with others.  o Wear a simple mask to cover your cough.  o Wash your hands well and often.    Resources    [facility]: About COVID-19: [email or web address]    CDC: What to Do If You're Sick: www.cdc.gov/coronavirus/2019-ncov/about/steps-when-sick.html    CDC: Ending Home Isolation: www.cdc.gov/coronavirus/2019-ncov/hcp/disposition-in-home-patients.html     CDC: Caring for Someone: www.cdc.gov/coronavirus/2019-ncov/if-you-are-sick/care-for-someone.html     [facility]: Interim Guidance for Hospital Discharge to Home: www.health[facility]/diseases/coronavirus/hcp/hospdischarge.pdf    [facility] clinical trials (COVID-19 research studies): [email or web address]-clinical-trials     Below are the COVID-19 hotlines at the [facility] ([facility]). Interpreters are available.   o For health questions: Call 517-073-4610 or 1-311.113.3511 (7 a.m. to 7 p.m.)  o For questions about schools and childcare: Call 991-490-1087 or 1-531.972.8431 (7 a.m. to 7  p.m.)        Reason for Disposition    MODERATE swelling of both ankles (e.g., swelling extends up to the knees) AND new onset or worsening    Additional Information    Negative: Sounds like a life-threatening emergency to the triager    Negative: Difficulty breathing at rest    Negative: Entire foot is cool or blue in comparison to other side    Negative: SEVERE swelling (e.g., swelling extends above knee, entire leg is swollen, weeping fluid)    Negative: Thigh or calf pain and only 1 side and present > 1 hour    Negative: Thigh, calf, or ankle swelling in only one leg    Negative: Thigh, calf, or ankle swelling in both legs, but one side is definitely more swollen (Exception: longstanding difference between legs)    Negative: Cast on leg or ankle and has increasing pain    Negative: Can't walk or can barely stand (new onset)    Negative: Fever and red area (or area very tender to touch)    Negative: Patient sounds very sick or weak to the triager    Negative: Swelling of face, arm or hands (Exception: slight puffiness of fingers during hot weather)    Negative: Pregnant > 20 weeks and sudden weight gain (i.e., > 2 lbs, 1 kg in one week)    Protocols used: LEG SWELLING AND EDEMA-A-OH

## 2021-06-17 NOTE — TELEPHONE ENCOUNTER
I called pt to advise. Pt states he is currently at the ER and will call us tmrw to make an appt for this week.

## 2021-06-17 NOTE — PROGRESS NOTES
[facility] Follow Up Note    Richie Gordillo   77 y.o. male    Date of Visit: 5/10/2018    Chief Complaint   Patient presents with     Follow-up     Subjective  Richie comes in today for follow-up of his chronic medical problems.  He is feeling more depressed and lacks energy.  He has not been out exercising.  He has chronic COPD and has not wanted further treatment or evaluation.  He denies any chest pain or leg pain with ambulation.  He does have a chronic cough and is taking Mucinex twice a day but does not use other inhalers.  He is not interested in evaluating to see if he qualify for oxygen.    ROS A comprehensive review of systems was performed and was otherwise negative.    Social History     Social History Narrative    He is a retired  and last worked at [employer]. He also was a dental technician. He is  and has 3 children and 8 grandchildren.       Medications were reconciled.  Allergies, social and family history, and the problem list were all reviewed and updated.      Exam  General Appearance: Pleasant and alert   Vitals:    05/10/18 0922 05/10/18 0926   BP: 152/58 132/56   Patient Site: Left Arm    Patient Position: Sitting    Cuff Size: Adult Regular    Pulse: 64    SpO2: 97%    Weight: 158 lb 9 oz (71.9 kg)       Body mass index is 23.42 kg/(m^2).  Wt Readings from Last 3 Encounters:   05/10/18 158 lb 9 oz (71.9 kg)   09/12/17 150 lb (68 kg)   08/27/17 153 lb 9 oz (69.7 kg)     HEENT: Sclera are clear.   Lungs: Normal respirations  Cardiac: Regular rate and rhythm with a 2 out of 6 systolic murmur  Abdomen: Soft and nondistended  Extremities: No edema  Skin: No rashes  Neuro: Moves all extremities and has facial symmetry  Gait: Ambulates with a normal gait    Assessment/Plan  1. Peripheral vascular disease  Remains on clopidogrel and aspirin daily.  Statin.    2. Homocysteinemia  We will check a level, has been on vitamins in the past,.  - Homocysteine    3.  Chronic kidney disease, unspecified CKD stage  Follow-up levels today.    4. Hypertension  Fairly stable on medication.  Does have an element of whitecoat hypertension.  - Basic Metabolic Panel  - HM2(CBC w/o Differential)  - Thyroid Stimulating Hormone (TSH)    5. Other hyperlipidemia  - Hepatic Profile  - Lipid Cascade    6. Vitamin D deficiency  Takes a supplement daily.  - Vitamin D, Total (25-Hydroxy)    7. Colon polyp  Due in October.  - Ambulatory referral for Colonoscopy          April D MD Karlie  Internal and Geriatric Medicine  [facility]    Much or all of the text in this note was generated through the use of Dragon Dictate voice-to-text software. Errors in spelling or words which seem out of context are unintentional. Sound alike errors, in particular, may have escaped editing.

## 2021-06-17 NOTE — PATIENT INSTRUCTIONS - HE
Weigh yourself daily at home and call our clinic if you gain more than 2 pounds in 1 day or more than 5 pounds in 1 week    Continue to restrict your sodium intake

## 2021-06-17 NOTE — TELEPHONE ENCOUNTER
"A voice message was left for the [facility] anticoagulation holding phone line with the documentation specified below (\"Do not take the Xarelto for 2 days before and the day of the colonoscopy.  Continue on your low-dose aspirin.\" - AVS from 4/20/21)  "

## 2021-06-18 NOTE — LETTER
Letter by Justo Tom MD at      Author: Justo Tom MD Service: -- Author Type: --    Filed:  Encounter Date: 2/14/2019 Status: (Other)       Richie Gordillo  [address] [place] [address]             February 14, 2019         Dear Mr. Gordillo,    Below are the results from your recent visit:    Resulted Orders   CT Low Dose Lung Screening Chest    Narrative    [facility] RADIOLOGY    EXAM: LOW DOSE LUNG CANCER SCREENING CT CHEST  LOCATION: [facility]  DATE/TIME: 2/14/2019 12:47 PM    INDICATION: Lung cancer screening. High risk patient with greater than 30 pack year smoking history.  COMPARISON: None.    TECHNIQUE: Low-dose lung cancer screening noncontrast CT chest. Dose reduction techniques were used.     FINDINGS:  LUNGS AND PLEURA: Negative screening CT chest. Some minimal scattered areas of fibrosis right middle lobe and lingula of no significance.    MEDIASTINUM: Negative. No lymphadenopathy.    CORONARY ARTERY CALCIFICATION: Moderate.    LIMITED UPPER ABDOMEN: Negative.    MUSCULOSKELETAL: Negative.      Impression    CONCLUSION:  1.  Negative screening CT chest.    RADIOLOGIST RECOMMENDATION: Recommend annual low-dose lung cancer screening CT if clinically appropriate.    LungRADS CATEGORY: 1: Negative.    SIGNIFICANT INCIDENTAL FINDINGS: Negative.       Negative screening chest CT scan.  Repeat in 1 year.    Please call with questions or contact us using [employer].    Sincerely,        Electronically signed by Justo Tom MD

## 2021-06-18 NOTE — PROGRESS NOTES
"[facility] Follow Up Note    Richie Gordillo   77 y.o. male    Date of Visit: 6/25/2018    Chief Complaint   Patient presents with     Follow-up     \" this medication not working, i feel dizzy an dwired dream, and wake me up at night, i wake up at least three time s at night\"     Subjective  Richie comes in today to follow-up recent medications.  He does not like the bupropion and feels as though it is making him dream too much.  He continues to smoke and really does not wish to quit.  He remains fairly sedentary and chronically depressed.    ROS A comprehensive review of systems was performed and was otherwise negative.    Social History     Social History Narrative    He is a retired  and last worked at [employer]. He also was a dental technician. He is  and has 3 children and 8 grandchildren.       Medications were reconciled.  Allergies, social and family history, and the problem list were all reviewed and updated.      Exam  General Appearance: Pleasant and alert   Vitals:    06/25/18 1342   BP: 139/79   Patient Site: Left Arm   Patient Position: Sitting   Cuff Size: Adult Regular   Pulse: 60   SpO2: 97%   Weight: 148 lb (67.1 kg)      Body mass index is 21.86 kg/(m^2).  Wt Readings from Last 3 Encounters:   06/25/18 148 lb (67.1 kg)   05/10/18 158 lb 9 oz (71.9 kg)   09/12/17 150 lb (68 kg)     HEENT: Sclera are clear.   Lungs: Normal respirations  Cardiac: Regular rate and rhythm   Abdomen: Soft and nondistended  Extremities: No edema  Skin: No rashes  Neuro: Moves all extremities and has facial symmetry  Gait: Ambulates with a normal gait    Assessment/Plan  1. Hypertension  We will stop the B program as I think this is exacerbating his hypertension.  Strongly encouraged him to quit smoking cigarettes.    2. Depression  Just keep him on mirtazapine for now.  - mirtazapine (REMERON) 30 MG tablet; Take 1 tablet (30 mg total) by mouth at bedtime.  Dispense: 90 tablet; " Refill: 3          April D MD Karlie  Internal and Geriatric Medicine  [facility]    Much or all of the text in this note was generated through the use of Dragon Dictate voice-to-text software. Errors in spelling or words which seem out of context are unintentional. Sound alike errors, in particular, may have escaped editing.

## 2021-06-18 NOTE — PATIENT INSTRUCTIONS - HE
Patient Instructions by Dinah Nevarez CNP at 12/3/2020 11:40 AM     Author: Dinah Nevarez CNP Service: -- Author Type: Nurse Practitioner    Filed: 12/3/2020 12:06 PM Encounter Date: 12/3/2020 Status: Addendum    : Dinah Nevarez CNP (Nurse Practitioner)    Related Notes: Original Note by Dinah Nevarez CNP (Nurse Practitioner) filed at 12/3/2020 12:03 PM       Rest, Ice (roll foot on a frozen water bottle 15 minutes four times per day), Elevate your foot.    Tylenol as needed for pain control.    Icy hot or biofreeze topically as needed for pain control.     Consider getting a good pair of supportive tennis shoes to wear, even while in the house.    Consider inserts.     Monitor your blood pressure at home. Follow up with Dr. Tom if running greater than 140/90mmhg.    Follow up if symptoms persist or worsen.     Patient Education     Treating Plantar Fasciitis  First, your healthcare provider tries to find out the cause of your problem. He or she can then suggest ways to ease pain. If your pain is due to poor foot mechanics, occasionally custom-made shoe inserts (orthoses) may help.    Ease symptoms    To relieve mild symptoms, try aspirin, ibuprofen, or other medicines as directed. Rubbing ice on the area may also help.    To lessen severe pain and swelling, your healthcare provider may give you pills or injections. In some cases, you may need a walking cast. Physical therapy, such as ultrasound or a daily stretching program, may also be recommended. Surgery is rarely needed.    To ease symptoms caused by poor foot mechanics, your foot may be taped. This supports the arch and temporarily controls movement. Night splints may also help by stretching the fascia.    Control movement  If taping helps, your healthcare provider may prescribe orthoses. These are inserts built from plaster casts of your feet. They control the way your foot moves. As a result, your  symptoms may go away.  Reduce overuse  Every time your foot strikes the ground, the plantar fascia is stretched. You can lessen the strain on the plantar fascia and the chance of overuse by:    Losing any excess weight    Not running on hard or uneven ground    Using orthoses, if recommended, in your shoes and house slippers  If surgery is needed  Your healthcare provider may consider surgery if other types of treatment don't control your pain. During surgery, the plantar fascia is partially cut to release tension. As you heal, fibrous tissue fills the space between the heel bone and the plantar fascia.  Date Last Reviewed: 1/1/2018 2000-2019 [employer]. [address] [place] [address]. All rights reserved. This information is not intended as a substitute for professional medical care. Always follow your healthcare professional's instructions.           Patient Education     Understanding Plantar Fasciitis    Plantar fasciitis is a condition that causes foot and heel pain. The plantar fascia is a tough band of tissue that runs across the bottom of the foot from the heel to the toes. This tissue pulls on the heel bone. It supports the arch of the foot as it pushes off the ground. If the tissue becomes irritated or red and swollen (inflamed), it is called plantar fasciitis.   How to say it  PLAN-tar [id number]-[id number]   What causes plantar fasciitis?  Plantar fasciitis most often occurs from overusing the plantar fascia. The tissue may become damaged from activities that put repeated stress on the heel and foot. Or it may wear down over time with age and ankle stiffness. You are more likely to have plantar fasciitis if you:    Do activities that require a lot of running, jumping, or dancing    Have new or increase activity    Have a job that requires being on your feet for long periods    Are overweight or obese    Have certain foot problems, such as a tight Achilles tendon, flat feet, or high  arches    Often wear poorly fitting shoes  Symptoms of plantar fasciitis  The condition most often causes pain in the heel and the bottom of the foot. The pain may occur when you take your first steps in the morning. It may get better as you walk throughout the day. But as you continue to put weight on the foot, the pain often returns. Pain may also occur after standing or sitting for long periods.  Treating plantar fasciitis  Treatments for plantar fasciitis include:    Resting the foot. This involves limiting movements that make your foot hurt. You may also need to avoid certain sports and types of work for a time.    Using cold packs. Put an ice pack (wrapped in a thin towel) on the heel and foot to help reduce pain and swelling.    Taking medicines. Prescription and over-the-counter medicines can help relieve pain and swelling. NSAIDs (nonsteroidal anti-inflammatory drugs) are the most common medicines used. They may be given as pills. Or they may be put on the skin as a gel, cream, or patch.    Using heel cups or foot inserts (orthotics). These are placed in the shoes to help support the heel or arch and cushion the heel. You may also be advised to buy proper-fitting shoes with good arch support and cushioned soles.    Taping the foot. This supports the arch and limits the movement of the plantar fascia to help ease symptoms.    Wearing a night splint. This stretches the plantar fascia and leg muscles while you sleep. This may help ease pain.    Doing exercises and physical therapy. These stretch and strengthen the plantar fascia and the muscles in the leg that support the heel and foot. Stretching your calf and plantar fascia is the most effective way to relieve pain.    Getting shots of medicine into the foot. These may help ease symptoms for a time. The shots often contain corticosteroids. These are strong anti-inflammatory medicines.    Having surgery. This may be needed if other treatments fail to relieve  symptoms. During surgery, the surgeon may partially cut the plantar fascia to release tension.  Possible complications of plantar fasciitis  Without proper care and treatment, healing may take longer than normal. Also, symptoms may continue or get worse. Over time, the plantar fascia may be damaged. This can make it hard to walk or even stand without pain.  When to call your healthcare provider  Call your healthcare provider right away if you have any of these:    Fever of 100.4 F (38 C) or higher, or as directed by your provider    Chills    Symptoms that dont get better with treatment, or get worse    New symptoms, such as numbness, tingling, or weakness in the foot  Date Last Reviewed: 3/10/2016    8443-0750 [employer]. [address] [place] [address], [place] [address]. All rights reserved. This information is not intended as a substitute for professional medical care. Always follow your healthcare professional's instructions.

## 2021-06-18 NOTE — PATIENT INSTRUCTIONS - HE
"Patient Instructions by Fabian Perez DO at 1/7/2021 11:30 AM     Author: Fabian Perez DO Service: -- Author Type: Physician    Filed: 1/7/2021 11:59 AM Encounter Date: 1/7/2021 Status: Addendum    : Fabian Perez DO (Physician)    Related Notes: Original Note by Fabian Perez DO (Physician) filed at 1/7/2021 11:58 AM       It was a pleasure to meet with you today in clinic.  Please do not hesitate to call the [facility] clinic with any questions or concerns at [phone].    Additional Provider Instructions:   1. Stress testing   2. Continue medications     Patient Survey:   You may be asked to participate in a survey regarding your experience during today's office visit.  The information from the survey is reviewed by both the provider and the clinic management in an effort to provider the best care for all patients.  Please consider taking the time to complete the survey.      To give this group a score, Medicare looks at the percentage of patient who gave their clinicians a rating of 9 or 10 on a scale of 0 (lowest) to 10 (highest) and/or marked \"always\" as a response.        Test Results:   You should receive a phone call from this office informing you of test or procedure results within 3 business days of the test being performed.  If you do not hear from our office with the test results within 1 week please do not hesitate to call asking for these results.    Sincerely,              "

## 2021-06-18 NOTE — LETTER
Letter by Justo Tom MD at      Author: Justo Tom MD Service: -- Author Type: --    Filed:  Encounter Date: 2/14/2019 Status: (Other)       Richie Gordillo  [address] [place] [address]             February 14, 2019         Dear Mr. Gordillo,    Below are the results from your recent visit:    Resulted Orders   Comprehensive Metabolic Panel   Result Value Ref Range    Sodium 133 (L) 136 - 145 mmol/L    Potassium 4.9 3.5 - 5.0 mmol/L    Chloride 96 (L) 98 - 107 mmol/L    CO2 26 22 - 31 mmol/L    Anion Gap, Calculation 11 5 - 18 mmol/L    Glucose 102 70 - 125 mg/dL    BUN 22 8 - 28 mg/dL    Creatinine 1.38 (H) 0.70 - 1.30 mg/dL    GFR MDRD Af Amer >60 >60 mL/min/1.73m2    GFR MDRD Non Af Amer 50 (L) >60 mL/min/1.73m2    Bilirubin, Total 0.3 0.0 - 1.0 mg/dL    Calcium 9.2 8.5 - 10.5 mg/dL    Protein, Total 6.3 6.0 - 8.0 g/dL    Albumin 3.5 3.5 - 5.0 g/dL    Alkaline Phosphatase 75 45 - 120 U/L    AST 15 0 - 40 U/L    ALT <9 0 - 45 U/L    Narrative    Fasting Glucose reference range is 70-99 mg/dL per  American Diabetes Association (ADA) guidelines.   HM2(CBC w/o Differential)   Result Value Ref Range    WBC 8.3 4.0 - 11.0 thou/uL    RBC 4.58 4.40 - 6.20 mill/uL    Hemoglobin 13.6 (L) 14.0 - 18.0 g/dL    Hematocrit 40.9 40.0 - 54.0 %    MCV 89 80 - 100 fL    MCH 29.7 27.0 - 34.0 pg    MCHC 33.3 32.0 - 36.0 g/dL    RDW 12.6 11.0 - 14.5 %    Platelets 286 140 - 440 thou/uL    MPV 7.2 7.0 - 10.0 fL   PSA (Prostatic-Specific Antigen), Annual Screen   Result Value Ref Range    PSA 0.6 0.0 - 6.5 ng/mL    Narrative    Method is Abbott Prostate-Specific Antigen (PSA)  Standard-WHO 1st International (90:10)       Creatinine is better than previous check.  Sodium level is okay at current level.    Blood sugar is normal.    Liver tests are normal.    Hemoglobin level is improved from previous and okay at current level.  Other blood counts are normal.    Prostate test is normal.    No change in treatment  plan.    Please call with questions or contact us using [employer].    Sincerely,        Electronically signed by Justo Tom MD

## 2021-06-19 NOTE — LETTER
Letter by Justo Tom MD at      Author: Justo Tom MD Service: -- Author Type: --    Filed:  Encounter Date: 8/8/2019 Status: (Other)         Richie Gordillo  [address] [place] [address]             August 8, 2019         Dear Mr. Gordillo,    Below are the results from your recent visit:    Resulted Orders   Comprehensive Metabolic Panel   Result Value Ref Range    Sodium 129 (L) 136 - 145 mmol/L    Potassium 5.7 (H) 3.5 - 5.0 mmol/L    Chloride 98 98 - 107 mmol/L    CO2 23 22 - 31 mmol/L    Anion Gap, Calculation 8 5 - 18 mmol/L    Glucose 92 70 - 125 mg/dL    BUN 21 8 - 28 mg/dL    Creatinine 1.47 (H) 0.70 - 1.30 mg/dL    GFR MDRD Af Amer 56 (L) >60 mL/min/1.73m2    GFR MDRD Non Af Amer 47 (L) >60 mL/min/1.73m2    Bilirubin, Total 0.5 0.0 - 1.0 mg/dL    Calcium 9.4 8.5 - 10.5 mg/dL    Protein, Total 5.9 (L) 6.0 - 8.0 g/dL    Albumin 3.7 3.5 - 5.0 g/dL    Alkaline Phosphatase 69 45 - 120 U/L    AST 16 0 - 40 U/L    ALT 12 0 - 45 U/L    Narrative    Fasting Glucose reference range is 70-99 mg/dL per  American Diabetes Association (ADA) guidelines.   HM2(CBC w/o Differential)   Result Value Ref Range    WBC 9.2 4.0 - 11.0 thou/uL    RBC 4.20 (L) 4.40 - 6.20 mill/uL    Hemoglobin 12.6 (L) 14.0 - 18.0 g/dL    Hematocrit 37.6 (L) 40.0 - 54.0 %    MCV 90 80 - 100 fL    MCH 30.1 27.0 - 34.0 pg    MCHC 33.6 32.0 - 36.0 g/dL    RDW 12.9 11.0 - 14.5 %    Platelets 250 140 - 440 thou/uL    MPV 8.1 7.0 - 10.0 fL   Lipid Cascade   Result Value Ref Range    Cholesterol 95 <=199 mg/dL    Triglycerides 81 <=149 mg/dL    HDL Cholesterol 33 (L) >=40 mg/dL    LDL Calculated 46 <=129 mg/dL    Patient Fasting > 8hrs? Yes    CK Total   Result Value Ref Range    CK, Total 120 30 - 190 U/L       Creatinine level is higher.  Significantly lower sodium and higher potassium level.  Reduce losartan down to 50 mg a day by cutting pills in half.    Check later this month as planned.    Excellent cholesterol levels.    CK,  muscle test, is normal.    Liver tests are normal.    Blood sugar is normal.    Hemoglobin level is stable, okay.    Please call with questions or contact us using My[employer].    Sincerely,        Electronically signed by Justo Tom MD

## 2021-06-19 NOTE — LETTER
Letter by Justo Tom MD at      Author: Justo Tom MD Service: -- Author Type: --    Filed:  Encounter Date: 8/14/2019 Status: (Other)         [facility]ordillo  [address] [facility] [facility] [address]             August 14, 2019         Dear Mr. Gordillo,    Below are the results from your recent visit:    Resulted Orders   Echo Complete   Result Value Ref Range    LV volume diastolic 68 62 - 150 cm3    LV volume systolic 29 21 - 61 cm3    HR 60 bpm    IVSd 1.31 (!) 0.6 - 1.0 cm    LVIDd 4.41 4.2 - 5.8 cm    LVIDs 3.04 2.5 - 4.0 cm    LVOT diam 2 cm    LVOT mean gradient 5 mmHg    LVOT peak VTI 25.2 cm    LVOT mean fern 105 cm/s    LVOT peak fern 115 cm/s    LVOT peak gradient 5 mmHg    LV PWd 1.12 (!) 0.6 - 1.0 cm    MV E' lat fern 6.73 cm/s    MV E' med fern 4.09 cm/s    AV mean fern 232 cm/s    AV mean gradient 23 mmHg    AV VTI 60.8 cm    AV peak fern 287 cm/s    AO root 3.3 cm    LA size 4.5 cm    MV decel time 331 ms    MV peak A fern 73.1 cm/s    MV peak E fern 80.5 cm/s    TR peak fern 267 cm/s    LA area 1 24 cm2    LA length 5.22 cm    LA area 2 22 cm2    TAPSE 3.2 cm    BSA 1.81 m2    Hieght 69 in    Weight 2,368 lbs    /88 mmHg    IVS/PW ratio 1.2     TR peak gradent 28.5 mmHg    LV FS 31.1 28 - 44 %    Echo LVEF calculated 57 55 - 75 %    LA volume 86.0 mL    LV mass 195.5 g    AV area 1.3 cm2    AV DIM IND fern 0.4     MV E/A Ratio 1.1     LVOT area 3.14 cm2    LVOT SV 79.1 cm3    AV peak gradient 32.9 mmHg    LV systolic volume index 16.0 11 - 31 cm3/m2    LV diastolic volume index 37.6 34 - 74 cm3/m2    LA volume index 47.5 mL/m2    LV mass index 108.0 g/m2    LV SVi 43.7 ml/m2    MV med E/e' ratio 19.7     MV lat E/e' ratio 12.0     LV CO 4.7 l/min    LV Ci 2.6 l/min/m2    Height 69.0 in    Weight 148 lbs    MV Avg E/e' Ratio 14.9 cm/s    AV DIM IND VTI 0.4     Addendum: 8/14/2019      Left ventricle ejection fraction is normal. The calculated left ventricular ejection fraction is 57%.    Normal  right ventricular size and systolic function.    Moderate aortic valve stenosis    Mild tricuspid regurgitation. No evidence of pulmonary hypertension.    When compared to the previous study dated 7/1/2015, the aortic valve stenosis has mildly progressed.         Narrative      Left ventricle ejection fraction is normal. The calculated left   ventricular ejection fraction is 57%.    Normal right ventricular size and systolic function.    Moderate aortic valve stenosis    Mild tricuspid regurgitation. No evidence of pulmonary hypertension.    When compared to the previous study dated 7/1/2015, the aortic valve   stenosis has mildly progressed.          Your aortic stenosis has progressed some, now moderate aortic stenosis.    Otherwise normal heart function.  This level of aortic stenosis will not stop you from having the planned eye surgery.    Recheck heart echo in 1 year.    Please call with questions or contact us using [employer].    Sincerely,        Electronically signed by Justo Tom MD

## 2021-06-19 NOTE — LETTER
Letter by Justo Tom MD at      Author: Justo Tom MD Service: -- Author Type: --    Filed:  Encounter Date: 8/21/2019 Status: (Other)         Richie Gordillo  [address] [place] [address]             August 21, 2019         Dear Mr. Gordillo,    Below are the results from your recent visit:    Resulted Orders   Basic Metabolic Panel   Result Value Ref Range    Sodium 128 (L) 136 - 145 mmol/L    Potassium 4.9 3.5 - 5.0 mmol/L    Chloride 94 (L) 98 - 107 mmol/L    CO2 25 22 - 31 mmol/L    Anion Gap, Calculation 9 5 - 18 mmol/L    Glucose 102 70 - 125 mg/dL    Calcium 9.3 8.5 - 10.5 mg/dL    BUN 18 8 - 28 mg/dL    Creatinine 1.45 (H) 0.70 - 1.30 mg/dL    GFR MDRD Af Amer 57 (L) >60 mL/min/1.73m2    GFR MDRD Non Af Amer 47 (L) >60 mL/min/1.73m2    Narrative    Fasting Glucose reference range is 70-99 mg/dL per  American Diabetes Association (ADA) guidelines.       Your sodium is even lower.  Reduce coffee drinking and avoid excess water drinking.    Potassium level is better.    Creatinine is still mildly above baseline level.    I will have you reduce your losartan further, down to 25 mg a day.    Follow-up with me September 26 as planned    Please call with questions or contact us using [employer].    Sincerely,        Electronically signed by Justo Tom MD

## 2021-06-19 NOTE — LETTER
Letter by Justo Tom MD at      Author: Justo Tom MD Service: -- Author Type: --    Filed:  Encounter Date: 5/16/2019 Status: (Other)         Richie Gordillo  [address] [place] [address]             May 16, 2019         Dear Mr. Gordillo,    Below are the results from your recent visit:    Resulted Orders   Comprehensive Metabolic Panel   Result Value Ref Range    Sodium 131 (L) 136 - 145 mmol/L    Potassium 5.1 (H) 3.5 - 5.0 mmol/L    Chloride 97 (L) 98 - 107 mmol/L    CO2 25 22 - 31 mmol/L    Anion Gap, Calculation 9 5 - 18 mmol/L    Glucose 102 70 - 125 mg/dL    BUN 22 8 - 28 mg/dL    Creatinine 1.39 (H) 0.70 - 1.30 mg/dL    GFR MDRD Af Amer 60 (L) >60 mL/min/1.73m2    GFR MDRD Non Af Amer 50 (L) >60 mL/min/1.73m2    Bilirubin, Total 0.2 0.0 - 1.0 mg/dL    Calcium 9.1 8.5 - 10.5 mg/dL    Protein, Total 5.7 (L) 6.0 - 8.0 g/dL    Albumin 3.1 (L) 3.5 - 5.0 g/dL    Alkaline Phosphatase 67 45 - 120 U/L    AST 13 0 - 40 U/L    ALT <9 0 - 45 U/L    Narrative    Fasting Glucose reference range is 70-99 mg/dL per  American Diabetes Association (ADA) guidelines.       Creatinine is stable.    Sodium is lower than usual.  Recheck sodium in 1 to 2 weeks, schedule a lab visit.    Potassium level is again borderline high, but okay at current level.    Liver tests are normal.  Blood sugar is normal.    Please call with questions or contact us using [employer].    Sincerely,        Electronically signed by Justo Tom MD

## 2021-06-19 NOTE — LETTER
Letter by Justo Tom MD at      Author: Justo Tom MD Service: -- Author Type: --    Filed:  Encounter Date: 9/27/2019 Status: Signed         [address] Gordillo  [address] [facility] [address]             September 27, 2019         Dear Mr. Gordillo,    Below are the results from your recent visit:    Resulted Orders   Basic Metabolic Panel   Result Value Ref Range    Sodium 134 (L) 136 - 145 mmol/L    Potassium 4.3 3.5 - 5.0 mmol/L    Chloride 98 98 - 107 mmol/L    CO2 26 22 - 31 mmol/L    Anion Gap, Calculation 10 5 - 18 mmol/L    Glucose 95 70 - 125 mg/dL    Calcium 8.9 8.5 - 10.5 mg/dL    BUN 21 8 - 28 mg/dL    Creatinine 1.24 0.70 - 1.30 mg/dL    GFR MDRD Af Amer >60 >60 mL/min/1.73m2    GFR MDRD Non Af Amer 57 (L) >60 mL/min/1.73m2    Narrative    Fasting Glucose reference range is 70-99 mg/dL per  American Diabetes Association (ADA) guidelines.       Your kidney labs are back to normal.  Sodium level is almost back to normal.  Continue on current medications.  No change in treatment plan.    Please call with questions or contact us using [employer].    Sincerely,        Electronically signed by Justo Tom MD

## 2021-06-19 NOTE — LETTER
Letter by Justo Tom MD at      Author: Justo Tom MD Service: -- Author Type: --    Filed:  Encounter Date: 10/21/2019 Status: Signed         [address]illo  [address] [facility] [address]             October 21, 2019         Dear Mr. Gordillo,    Below are the results from your recent visit:    Resulted Orders   Comprehensive Metabolic Panel   Result Value Ref Range    Sodium 135 (L) 136 - 145 mmol/L    Potassium 5.2 (H) 3.5 - 5.0 mmol/L    Chloride 99 98 - 107 mmol/L    CO2 27 22 - 31 mmol/L    Anion Gap, Calculation 9 5 - 18 mmol/L    Glucose 104 70 - 125 mg/dL    BUN 16 8 - 28 mg/dL    Creatinine 1.41 (H) 0.70 - 1.30 mg/dL    GFR MDRD Af Amer 59 (L) >60 mL/min/1.73m2    GFR MDRD Non Af Amer 49 (L) >60 mL/min/1.73m2    Bilirubin, Total 0.3 0.0 - 1.0 mg/dL    Calcium 9.0 8.5 - 10.5 mg/dL    Protein, Total 5.9 (L) 6.0 - 8.0 g/dL    Albumin 3.3 (L) 3.5 - 5.0 g/dL    Alkaline Phosphatase 70 45 - 120 U/L    AST 14 0 - 40 U/L    ALT <9 0 - 45 U/L    Narrative    Fasting Glucose reference range is 70-99 mg/dL per  American Diabetes Association (ADA) guidelines.   HM2(CBC w/o Differential)   Result Value Ref Range    WBC 8.4 4.0 - 11.0 thou/uL    RBC 4.22 (L) 4.40 - 6.20 mill/uL    Hemoglobin 13.3 (L) 14.0 - 18.0 g/dL    Hematocrit 39.4 (L) 40.0 - 54.0 %    MCV 93 80 - 100 fL    MCH 31.4 27.0 - 34.0 pg    MCHC 33.7 32.0 - 36.0 g/dL    RDW 12.0 11.0 - 14.5 %    Platelets 271 140 - 440 thou/uL    MPV 7.8 7.0 - 10.0 fL       Creatinine is back to previous level of 1.4.  Potassium level is borderline high.  Continue on current dose of losartan.  No change in plan for surgery.  Recheck in December, as planned.    Liver tests are normal.    Blood sugar is normal.    Hemoglobin level is improved/okay.    Please call with questions or contact us using [employer].    Sincerely,        Electronically signed by Justo Tom MD

## 2021-06-19 NOTE — LETTER
Letter by Justo Tom MD at      Author: Justo Tom MD Service: -- Author Type: --    Filed:  Encounter Date: 5/29/2019 Status: (Other)         Richie Gordillo  [address] [place] [address]             May 29, 2019         Dear Mr. Gordillo,    Below are the results from your recent visit:    Resulted Orders   Basic Metabolic Panel   Result Value Ref Range    Sodium 132 (L) 136 - 145 mmol/L    Potassium 5.0 3.5 - 5.0 mmol/L    Chloride 97 (L) 98 - 107 mmol/L    CO2 24 22 - 31 mmol/L    Anion Gap, Calculation 11 5 - 18 mmol/L    Glucose 105 70 - 125 mg/dL    Calcium 9.6 8.5 - 10.5 mg/dL    BUN 20 8 - 28 mg/dL    Creatinine 1.38 (H) 0.70 - 1.30 mg/dL    GFR MDRD Af Amer >60 >60 mL/min/1.73m2    GFR MDRD Non Af Amer 50 (L) >60 mL/min/1.73m2    Narrative    Fasting Glucose reference range is 70-99 mg/dL per  American Diabetes Association (ADA) guidelines.       Sodium level is slightly better.  Potassium level is slightly better.  Creatinine is stable.    No change in treatment plan.  Recheck labs in August at your next follow-up appointment.    Please call with questions or contact us using [employer].    Sincerely,        Electronically signed by Justo Tom MD

## 2021-06-20 NOTE — LETTER
Letter by Justo Tom MD at      Author: Justo Tom MD Service: -- Author Type: --    Filed:  Encounter Date: 1/27/2020 Status: (Other)         [address] [facility]ordillo  [address] [facility] [address]             January 27, 2020         Dear Mr. Gordillo,    Below are the results from your recent visit:    Resulted Orders   Basic Metabolic Panel   Result Value Ref Range    Sodium 143 136 - 145 mmol/L    Potassium 4.6 3.5 - 5.0 mmol/L    Chloride 100 98 - 107 mmol/L    CO2 32 (H) 22 - 31 mmol/L    Anion Gap, Calculation 11 5 - 18 mmol/L    Glucose 110 70 - 125 mg/dL    Calcium 9.5 8.5 - 10.5 mg/dL    BUN 28 8 - 28 mg/dL    Creatinine 1.99 (H) 0.70 - 1.30 mg/dL    GFR MDRD Af Amer 40 (L) >60 mL/min/1.73m2    GFR MDRD Non Af Amer 33 (L) >60 mL/min/1.73m2    Narrative    Fasting Glucose reference range is 70-99 mg/dL per  American Diabetes Association (ADA) guidelines.   Magnesium   Result Value Ref Range    Magnesium 2.0 1.8 - 2.6 mg/dL       Creatinine is mildly higher.  Potassium and magnesium levels are normal.    No change in plan for cardioversion.  I will plan to recheck your creatinine at February 20 appointment.  No change in treatment plan.    Please call with questions or contact us using [employer].    Sincerely,        Electronically signed by Justo Tom MD

## 2021-06-20 NOTE — LETTER
Letter by Justo Tom MD at      Author: Justo Tom MD Service: -- Author Type: --    Filed:  Encounter Date: 3/10/2020 Status: (Other)         [facility]ordillo  [address] [facility] [address]             March 10, 2020         Dear Mr. Gordillo,    Below are the results from your recent visit:    Resulted Orders   CT Low Dose Lung Screening Chest    Narrative    EXAM: LOW DOSE LUNG CANCER SCREENING CT CHEST  LOCATION: [facility]  DATE/TIME: 3/10/2020 10:49 AM    INDICATION: Lung cancer screening. History of smoking. High risk patient with greater than 30 pack year smoking history.  COMPARISON: 02/14/2019.    TECHNIQUE: Low-dose lung cancer screening non-contrast CT chest. Dose reduction techniques were used.     FINDINGS:  NODULES: None.    LUNGS AND PLEURA: Bandlike scarring in the inferior lingula and right middle lobe has mildly progressed from the prior study. There are mild paraseptal bullae in the apices. No focal consolidation, pneumothorax or pleural effusion.    MEDIASTINUM: No lymphadenopathy. No pericardial effusion.    CORONARY ARTERY CALCIFICATION: Moderate.    LIMITED UPPER ABDOMEN: Arterial calcification.    MUSCULOSKELETAL: Normal.      Impression    1.  Negative for lung cancer screening purposes.    LungRADS CATEGORY: 1: Negative.    RADIOLOGIST RECOMMENDATION: Continue annual screening with low-dose CT chest in 12 months March 2021.       Screening chest CT scan negative.  Repeat in 1 year.    Please call with questions or contact us using [employer].    Sincerely,        Electronically signed by Justo Tom MD

## 2021-06-21 NOTE — LETTER
Letter by Justo Tom MD at      Author: Justo Tom MD Service: -- Author Type: --    Filed:  Encounter Date: 2/17/2021 Status: (Other)         [facility] [facility]ordillo  [address] [facility] [address]             February 17, 2021         Dear Mr. Gordillo,    Below are the results from your recent visit:    Resulted Orders   Basic Metabolic Panel   Result Value Ref Range    Sodium 139 136 - 145 mmol/L    Potassium 5.1 (H) 3.5 - 5.0 mmol/L    Chloride 100 98 - 107 mmol/L    CO2 32 (H) 22 - 31 mmol/L    Anion Gap, Calculation 7 5 - 18 mmol/L    Glucose 114 70 - 125 mg/dL    Calcium 8.9 8.5 - 10.5 mg/dL    BUN 38 (H) 8 - 28 mg/dL    Creatinine 2.17 (H) 0.70 - 1.30 mg/dL    GFR MDRD Af Amer 36 (L) >60 mL/min/1.73m2    GFR MDRD Non Af Amer 30 (L) >60 mL/min/1.73m2    Narrative    Fasting Glucose reference range is 70-99 mg/dL per  American Diabetes Association (ADA) guidelines.       Creatinine level is still elevated, but improved.  Potassium level elevated but improved.  Stay off lisinopril.  No change in treatment plan.    Please call with questions or contact us using [employer].    Sincerely,        Electronically signed by Justo Tom MD

## 2021-06-21 NOTE — LETTER
Letter by Justo Tom MD at      Author: Justo Tom MD Service: -- Author Type: --    Filed:  Encounter Date: 4/21/2021 Status: (Other)         Richie Gordillo  [address] [place] [address]             April 21, 2021         Dear Mr. Gordillo,    Below are the results from your recent visit:    Resulted Orders   Comprehensive Metabolic Panel   Result Value Ref Range    Sodium 139 136 - 145 mmol/L    Potassium 4.5 3.5 - 5.0 mmol/L    Chloride 99 98 - 107 mmol/L    CO2 30 22 - 31 mmol/L    Anion Gap, Calculation 10 5 - 18 mmol/L    Glucose 97 70 - 125 mg/dL    BUN 35 (H) 8 - 28 mg/dL    Creatinine 2.19 (H) 0.70 - 1.30 mg/dL    GFR MDRD Af Amer 35 (L) >60 mL/min/1.73m2    GFR MDRD Non Af Amer 29 (L) >60 mL/min/1.73m2    Bilirubin, Total 0.3 0.0 - 1.0 mg/dL    Calcium 8.8 8.5 - 10.5 mg/dL    Protein, Total 6.2 6.0 - 8.0 g/dL    Albumin 3.3 (L) 3.5 - 5.0 g/dL    Alkaline Phosphatase 89 45 - 120 U/L    AST 13 0 - 40 U/L    ALT <9 0 - 45 U/L    Narrative    Fasting Glucose reference range is 70-99 mg/dL per  American Diabetes Association (ADA) guidelines.   HM2(CBC w/o Differential)   Result Value Ref Range    WBC 8.6 4.0 - 11.0 thou/uL    RBC 4.49 4.40 - 6.20 mill/uL    Hemoglobin 13.3 (L) 14.0 - 18.0 g/dL    Hematocrit 40.4 40.0 - 54.0 %    MCV 90 80 - 100 fL    MCH 29.6 27.0 - 34.0 pg    MCHC 32.9 32.0 - 36.0 g/dL    RDW 13.1 11.0 - 14.5 %    Platelets 236 140 - 440 thou/uL    MPV 9.4 7.0 - 10.0 fL       BUN and creatinine levels are stable.  Kidney labs are stable.  Potassium level is normal.    Liver tests are normal.    Blood sugar is normal.    Hemoglobin level is improved and okay at current level.    No change in treatment plan.    Please call with questions or contact us using [employer].    Sincerely,        Electronically signed by Justo Tom MD

## 2021-06-24 NOTE — PROGRESS NOTES
[facility] Follow Up Note    Richie Gordillo   78 y.o. male    Date of Visit: 2/13/2019    Chief Complaint   Patient presents with     Establish Care     Subjective  Richie is here to establish care, former Dr. Ziegler patient.  Patient used to live in [place] and [facility] in [place], now living near his daughter and grandchildren [facility] and [place].    78-year-old man with significant vascular disease and continued smoking.  Still smoking 1/2 pack a day and not ready to quit.    He had a right iliac stent in 2009 and 2010.  No current claudication.  No history of foot ulcers.    In 2015 carotid ultrasound did show bilateral carotid artery stenosis 50-69%.  MRI MRA showed some mild plaque but no severe stenosis intracranially.  Small cerebellar chronic infarct.  No major focal stroke.  No neurologic deficits.    CHARLIE at that time did show moderate plaque in the arch of his aorta.  No heart valve problem or aortic stenosis.  He has a slight aortic outflow murmur.  No heart wall motion abnormalities.  Ejection fraction 65%.    He has not had chest pain or chest pressure.  No history of MI.  No previous stress test.    He has not had any new TIA or strokelike symptoms.  No falls.    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  He still sees the eye doctor regularly and was seen last month and has an appointment next month.    No headache complaints.    No change in cough or increasing shortness of breath.  He does use Spiriva daily.  Does not need albuterol.  Last chest x-ray August 2017 just showed emphysema, no mass or nodule.  He is not done lung cancer screening so far.  He is interested in doing that.    No hemoptysis.  No history of leukoplakia or mouth lesions.  No swallowing difficulty.  No hematuria.    Status post TURP.  Just mild prostatism symptoms adequately controlled with Flomax.  No dysuria.  He has not had a recent PSA check but  does wish to have one today.    Bowels are normal.  No blood in stool.  He had a sessile serrated polyp and somewhat poor prep on 2018 colonoscopy and they gave him a 1 year follow-up.    His mother  of pancreatic cancer at age 52.  He has a brother with coronary artery disease.    He has chronic renal insufficiency of unclear etiology, presumed the nephrosclerosis.  May 2018 creatinine was 1.5.  Range of creatinine is been 1.3-1.7.  No lower extremity edema issues.    Hypertension has been borderline controlled.  Blood pressure 2018 was 139/79.  He does not check his blood pressure regularly.  Denies orthostasis.  On amlodipine 5 mg a day, losartan 100 mg a day and Toprol- mg a day.    He is on simvastatin 40 mg a day.  May 2018 LDL 63 and HDL 31.  Normal liver tests and normal TSH at that time.    No generalized myalgias or right upper quadrant pain and no history of liver problems.    He is on aspirin and Plavix, with history of iliac stent.  No bleeding issues.  No nosebleed complaints.    He had a perirectal abscess removed 2017.  No complaints now.    He is retired  from [employer].  Previous alcohol but in remission.    Lives independently with wife.  Active with his grandchildren, drives them to school sometimes.    PMHx:    Past Medical History:   Diagnosis Date     Arthritis      BPH (benign prostatic hyperplasia)      Central retinal artery occlusion, right eye      Chronic kidney disease     CKD     Essential tremor      History of alcohol abuse     in remission     HTN (hypertension)      Hyperlipidemia      Hyponatremia      Leg mass, left      Murmur      PVD (peripheral vascular disease) (H)      Renal Tubular Acidosis      secondary to bladder outlet obstruction; resolved       Sleep apnea      Stroke (H)     TIA     PSHx:    Past Surgical History:   Procedure Laterality Date     CATARACT EXTRACTION       EXTERNAL EAR SURGERY      mastoid     EYE SURGERY        ILIAC ARTERY STENT Right     twice, 2009 and 2010     LEG SKIN LESION  BIOPSY / EXCISION Left 7/27/2017    Procedure: EXCISION LEFT POSTERIOR LEG MASS;  Surgeon: Joseluis Fernandez MD;  Location: [facility];  Service:      NC TRANSURETHRAL ELEC-SURG PROSTATECTOM      Description: Transurethral Resection Of Prostate (TURP);  Recorded: 04/27/2009;     Immunizations:   Immunization History   Administered Date(s) Administered     DT (pediatric) 09/21/2005     Influenza, inj, historic,unspecified 11/14/2011     Influenza, seasonal,quad inj 6-35 mos 01/14/2013     Pneumo Conj 13-V (2010&after) 04/06/2017     Pneumo Polysac 23-V 03/03/2009     Td,adult,historic,unspecified 09/21/2005     Tdap 09/28/2013     ZOSTER, LIVE 12/08/2011       ROS A comprehensive review of systems was performed and was otherwise negative    Medications, allergies, and problem list were reviewed and updated    Exam  /60 (Patient Site: Right Arm, Patient Position: Sitting, Cuff Size: Adult Large)   Pulse 60   Resp 16   Wt 154 lb (69.9 kg)   BMI 22.74 kg/m    Alert and oriented x3 with good mood and affect.  Right pupil nonreactive with opacification of the cornea with ointment and eye.  Left pupil appears normal.  No jaundice.  External ears and nose exam is normal.  Pharynx is normal without crowding.  No leukoplakia.  Teeth in adequate condition.  No cervical or supraclavicular adenopathy.  No JVD.  He does have a right carotid artery bruit.  I did not hear a bruit on the left.  Lungs are clear to auscultation with just slight reduced respiratory excursion and decreased breath sounds but no wheezing or crackles.  Heart is regular with 2 out of 6 systolic murmur at the right upper sternal border.  No gallop or rub.  Trace to +1 dorsalis pedis pulses bilaterally in the feet.  Good capillary refill.  No ankle edema.  No ulcers.  Abdomen is nonobese nontender.  His liver edge is somewhat large about 1 cm below the costal margin.  No  splenomegaly.  I did not feel pulsatile mass.    Assessment/Plan  1. Peripheral vascular disease (H)  No current claudication.  I stressed the importance of daily walking, we discussed a walking plan.    Continue Plavix and aspirin.    I would have him consider more potent statin such as Lipitor 40 mg in the future.  I discussed toxicity risk with statin including liver and muscle toxicity.    I stressed the importance of smoking cessation, he still not ready to set a quit date.    History of stroke and carotid artery stenosis.  I would have him consider repeat carotid artery ultrasound to evaluate for critical carotid artery stenosis that may be benefit from prophylactic CEA.  We can discuss this at spring visit.    2. Hypertension  Borderline high systolic.  Goal blood pressure less than 140/85, given his severe peripheral vascular disease.    Continue current medications at this time.  If significant worsening creatinine, may need to reduce the losartan.    3. Chronic kidney disease, unspecified CKD stage  Check labs today.  Avoid NSAIDs except for low-dose aspirin    4. Depression  Uses Remeron 30 mg in the evening.  He is been on that for many years.  History of alcohol use, but reported is not active at this time.  Appears to be adequately controlled at this time.    5. Hyperlipidemia  Discussion for change to Lipitor above the.    Checking liver test today.    6. BPH (benign prostatic hyperplasia)  Post TURP.  Voiding adequately with Flomax.    7. History of colonic polyps  Advanced adenoma.  1 year follow-up colonoscopy October 2019    8. History of stroke  Small sellar Gerson strokes.  He does have the diagnosis of sleep apnea but has not used CPAP.  He denies significant daytime sleepiness.  He reportedly is no longer drinking alcohol on a regular basis.  He does not wish to go back to the sleep clinic.    Focus on smoking cessation and blood pressure control.    9. Medication monitoring encounter    -  Comprehensive Metabolic Panel  - HM2(CBC w/o Differential)    10. History of tobacco use  Patient refuses to quit.  I did discuss the lung CT screening protocol.  I discussed the risk of false positive with nodule, and risk for intervention that he may not otherwise need.  Patient accepts these risks and wishes to proceed with screening.  I did discuss follow-up for yearly CTs.  - CT Low Dose Lung Screening Chest; Future    11. Pulmonary emphysema, unspecified emphysema type (H)  Mild symptoms.  Controlled with Spiriva.  Can use albuterol as needed.    Flu shot today  - Influenza High Dose, Seasonal 65+ yrs    12. Screening for prostate cancer  I discussed risk of false positive PSA  - PSA (Prostatic-Specific Antigen), Annual Screen    History of central retinal vein occlusion, treatment plan as per stroke above.  Sees ophthalmology next month.    Systolic murmur.  It was not aortic stenosis back in 2015 on echo.  I would have him consider repeat echo this year or next year to evaluate for progressive aortic stenosis and rule out cardiac dysfunction or diastolic dysfunction, with his possible history of sleep apnea.    Return in about 3 months (around 5/13/2019) for Recheck.   Patient Instructions   Lab work today, including PSA.  Results to be mailed to you.    See  today to set up a screening lung CT scan, for lung cancer screening.    Think about setting a quit date to stop smoking.    Try to walk 15-20 minutes every day.  Consider walking on the treadmill at the [facility] on a regular basis.  This will help your leg circulation.    Consider changing your cholesterol medication to a more potent type of statin, such as atorvastatin/Lipitor.    Consider cardiac echo in the future to evaluate your murmur.    Consider reimaging of your carotid arteries of your neck to evaluate for significant worsening of your carotid artery stenosis.    I will plan to see you in the spring in 3-4 months to recheck blood pressure  and discuss these above issues.    1 year colonoscopy planned for October 2019.    Justo Tom MD  I spent a total time with patient of over 40 minutes and over 50% coord care.  Time all face to face.      Current Outpatient Medications   Medication Sig Dispense Refill     amLODIPine (NORVASC) 5 MG tablet TAKE 1 TABLET DAILY 90 tablet 0     aspirin 81 MG EC tablet Take 1 tablet (81 mg total) by mouth daily. 90 tablet 3     b complex vitamins tablet Take 2 tablets by mouth daily. 180 tablet 12     cholecalciferol, vitamin D3, 400 unit Tab Take 400 Units by mouth daily.       clopidogrel (PLAVIX) 75 mg tablet Take 1 tablet (75 mg total) by mouth daily. 90 tablet 3     cyanocobalamin (VITAMIN B-12) 500 MCG tablet Take 500 mcg by mouth daily.       ferrous gluconate 256 mg (28 mg iron) Tab Take 1 tablet by mouth daily.       guaiFENesin ER (MUCINEX) 600 mg 12 hr tablet Take 1,200 mg by mouth 2 (two) times a day as needed (Using 1-2 times per week as needed.).              losartan (COZAAR) 100 MG tablet Take 1 tablet (100 mg total) by mouth daily. 90 tablet 5     mirtazapine (REMERON) 30 MG tablet Take 1 tablet (30 mg total) by mouth at bedtime. 90 tablet 3     multivitamin therapeutic (THERAGRAN) tablet Take 1 tablet by mouth daily.       omega-3 fatty acids-vitamin E (FISH OIL) 1,000 mg cap Take 1 capsule by mouth daily.       simvastatin (ZOCOR) 40 MG tablet TAKE 1 TABLET DAILY (DUE FOR LABS) (09/06/2017, PATIENT DUE FOR PHYSICAL BEFORE NEXT MEDICATION REFILL) 90 tablet 5     tamsulosin (FLOMAX) 0.4 mg Cp24 TAKE 1 CAPSULE DAILY 90 capsule 2     tiotropium (SPIRIVA WITH HANDIHALER) 18 mcg inhalation capsule Place 1 capsule (2 puffs total) into inhaler and inhale daily. Close and press button to crush and then inhale in 2 breaths. (Patient taking differently: Place 18 mcg into inhaler and inhale daily as needed (Using 1-2 times per week as needed.). Close and press button to crush and then inhale in 2  breaths.      ) 90 capsule prn     TOPROL  mg 24 hr tablet TAKE 1 TABLET DAILY. 90 tablet 3     vit B comp no.3-folic-C-biotin (B COMPLEX-VITAMIN C-FOLIC ACID) 1- mg-mg-mcg Tab tablet Take 2 tablets by mouth daily. (Patient taking differently: Take 1 tablet by mouth daily.       ) 180 tablet 12     ipratropium (ATROVENT) 0.03 % nasal spray 2 sprays into each nostril 3 (three) times a day as needed for rhinitis. (Patient not taking: Reported on 2/13/2019      ) 30 mL prn     No current facility-administered medications for this visit.      Allergies   Allergen Reactions     Quinolones Itching     Social History     Tobacco Use     Smoking status: Current Every Day Smoker     Packs/day: 0.50     Types: Cigarettes     Start date: 2/16/1962     Smokeless tobacco: Never Used   Substance Use Topics     Alcohol use: No     Comment: hx alcohol abuse in remission     Drug use: No

## 2021-06-24 NOTE — TELEPHONE ENCOUNTER
Wrong dept associated with this encounter. Encounter closed and new encounter started.    Arelis Lewis, RN, BSN, PHN  [facility] Medication Refill Nurse  3/14/2019  10:12 AM

## 2021-06-24 NOTE — TELEPHONE ENCOUNTER
Refill Approved    Rx renewed per Medication Renewal Policy. Medication was last renewed on 18. 18    Kendra Zarate, [facility] Triage/Med Refill 2019     Requested Prescriptions   Pending Prescriptions Disp Refills     amLODIPine (NORVASC) 5 MG tablet 90 tablet 0     Si tablet (5 mg total) daily.    Calcium-Channel Blockers Protocol Passed - 2019 10:56 AM       Passed - PCP or prescribing provider visit in past 12 months or next 3 months    Last office visit with prescriber/PCP: 2019 Justo Tom MD OR same dept: 2019 Justo Tom MD OR same specialty: 2019 Justo Tom MD  Last physical: Visit date not found Last MTM visit: Visit date not found   Next visit within 3 mo: Visit date not found  Next physical within 3 mo: Visit date not found  Prescriber OR PCP: Justo Tom MD  Last diagnosis associated with med order: 1. Hypertension  - amLODIPine (NORVASC) 5 MG tablet; 1 tablet (5 mg total) daily.  Dispense: 90 tablet; Refill: 0    2. Depression  - mirtazapine (REMERON) 30 MG tablet; Take 1 tablet (30 mg total) by mouth at bedtime.  Dispense: 90 tablet; Refill: 3    If protocol passes may refill for 12 months if within 3 months of last provider visit (or a total of 15 months).            Passed - Blood pressure filed in past 12 months    BP Readings from Last 1 Encounters:   19 148/60             mirtazapine (REMERON) 30 MG tablet 90 tablet 3     Sig: Take 1 tablet (30 mg total) by mouth at bedtime.    Tricyclics/Misc Antidepressant/Antianxiety Meds Refill Protocol Passed - 2019 10:56 AM       Passed - PCP or prescribing provider visit in last year    Last office visit with prescriber/PCP: 2019 Justo Tom MD OR same dept: 2019 Justo Tom MD OR same specialty: 2019 Justo Tom MD  Last physical: Visit date not found Last MTM visit: Visit date not found   Next visit within 3 mo: Visit date not found  Next physical within 3  mo: Visit date not found  Prescriber OR PCP: Justo Tom MD  Last diagnosis associated with med order: 1. Hypertension  - amLODIPine (NORVASC) 5 MG tablet; 1 tablet (5 mg total) daily.  Dispense: 90 tablet; Refill: 0    2. Depression  - mirtazapine (REMERON) 30 MG tablet; Take 1 tablet (30 mg total) by mouth at bedtime.  Dispense: 90 tablet; Refill: 3    If protocol passes may refill for 12 months if within 3 months of last provider visit (or a total of 15 months).

## 2021-06-24 NOTE — PATIENT INSTRUCTIONS - HE
Lab work today, including PSA.  Results to be mailed to you.    See  today to set up a screening lung CT scan, for lung cancer screening.    Think about setting a quit date to stop smoking.    Try to walk 15-20 minutes every day.  Consider walking on the treadmill at the [facility] on a regular basis.  This will help your leg circulation.    Consider changing your cholesterol medication to a more potent type of statin, such as atorvastatin/Lipitor.    Consider cardiac echo in the future to evaluate your murmur.    Consider reimaging of your carotid arteries of your neck to evaluate for significant worsening of your carotid artery stenosis.    I will plan to see you in the spring in 3-4 months to recheck blood pressure and discuss these above issues.    1 year colonoscopy planned for October 2019.

## 2021-06-24 NOTE — TELEPHONE ENCOUNTER
"Pt calling about his Flomax Rx.  Writer explained it can take up to 72 business hrs for refills to process.  Pt states he has 6 days of medication but gets it from Mail order and \"needs it sent to his pharmacy today\".  Kandace Lang, RN, [facility] RN Triage/Med Refills      "

## 2021-06-24 NOTE — TELEPHONE ENCOUNTER
Refill Request  Did you contact pharmacy: Yes  Medication name:   Requested Prescriptions     Pending Prescriptions Disp Refills     tamsulosin (FLOMAX) 0.4 mg cap 90 capsule 2     Si capsule (0.4 mg total) daily.     Who prescribed the medication: Dr. Tom  Pharmacy Name and Location: [facility]  Is patient out of medication: No  Patient notified refills processed in 72 hours:  yes  Okay to leave a detailed message: yes

## 2021-06-24 NOTE — TELEPHONE ENCOUNTER
Refill Approved    Rx renewed per Medication Renewal Policy. Medication was last renewed on 18.    Ov: 19    Arelis Lewis, [facility] Triage/Med Refill 3/14/2019     Requested Prescriptions   Pending Prescriptions Disp Refills     tamsulosin (FLOMAX) 0.4 mg cap 90 capsule 2     Si capsule (0.4 mg total) daily.    Alfuzosin/Tamsulosin/Silodosin Refill Protocol  Passed - 3/11/2019  9:17 AM       Passed - PCP or prescribing provider visit in past 12 months      Last office visit with prescriber/PCP: 2019 Justo Tom MD OR same dept: 2019 Justo Tom MD OR same specialty: 2019 Justo Tom MD  Last physical: Visit date not found Last MTM visit: Visit date not found   Next visit within 3 mo: Visit date not found  Next physical within 3 mo: Visit date not found  Prescriber OR PCP: Justo Tom MD  Last diagnosis associated with med order: 1. BPH (benign prostatic hyperplasia)  - tamsulosin (FLOMAX) 0.4 mg cap; 1 capsule (0.4 mg total) daily.  Dispense: 90 capsule; Refill: 2    If protocol passes may refill for 12 months if within 3 months of last provider visit (or a total of 15 months).

## 2021-06-25 NOTE — PROGRESS NOTES
Progress Notes by Edmund Henriquez MD at 2/28/2017 10:20 AM     Author: Edmund Henriquez MD Service: -- Author Type: Physician    Filed: 2/28/2017 11:12 AM Encounter Date: 2/28/2017 Status: Signed    : Edmund Henriquez MD (Physician)        VASCULAR SURGERY CLINIC     VASCULAR SURGEON: Edmund Henriquez MD     LOCATION:  [facility] VASCULAR CLINIC    Richie Gordillo   Medical Record #:  [id number]  YOB: 1940  Age:  76 y.o.     Date of Service: 2/28/2017    PRIMARY CARE PROVIDER: Linnette Ziegler MD  Requesting Provider: Linnette Ziegler MD    Reason for visit:  Follow up claudication and carotid artery stenosis    IMPRESSION:   Claudication with on going tobacco abuse.    Remote history of retinal artery embolism-( < 70 & Carotid Artery stenoses, bilaterally by past Carotid Artery Duplex evaluation)    RECOMMENDATION:    Increase walking /exercise program    Complete cessation of tobacco.   Follow up in one year with Edmund Henriquez MD for carotid artery Duplex  Stroke symptoms reviewed with patient    HPI:  Richie Gordillo is a 76 y.o. male who was seen today in follow up from his claudication and carotid artery disease. He can walk approx one half mile, or so before stopping. This is an improvement from 18 mos prior when he was walking less than one block before onset of pain in calf.  He has  been trying to be involved in any exercise/walking program but is not as faithful in winter/cold mos. . He had Stenting of Right EIA 08/2009 and repeat Stenting of Right EIA in 08/2010. No ischemic rest pain or ulcers. He has not had any symptoms of tia, cva or amaurosis fugax. Continues to smoke cigarettes.    PHH:    Past Medical History:   Diagnosis Date   ? Arthritis    ? CKD (chronic kidney disease)    ? HTN (hypertension)    ? PVD (peripheral vascular disease)    ? Sleep apnea    ? TIA (transient ischemic attack)         Past Surgical History:   Procedure Laterality Date   ? CATARACT  EXTRACTION     ? EXTERNAL EAR SURGERY      mastoid   ? ILIAC ARTERY STENT Right     twice, 2009 and 2010   ? NV TRANSURETHRAL ELEC-SURG PROSTATECTOM      Description: Transurethral Resection Of Prostate (TURP);  Recorded: 04/27/2009;       ALLERGIES:  Ciprofloxacin    MEDS:    Current Outpatient Prescriptions:   ?  amLODIPine (NORVASC) 5 MG tablet, TAKE 1 TABLET DAILY, Disp: 90 tablet, Rfl: 3  ?  cholecalciferol, vitamin D3, 400 unit Tab, Take 400 Units by mouth daily., Disp: , Rfl:   ?  clopidogrel (PLAVIX) 75 mg tablet, TAKE 1 TABLET DAILY, Disp: 90 tablet, Rfl: 3  ?  cyanocobalamin (VITAMIN B-12) 500 MCG tablet, Take 500 mcg by mouth daily., Disp: , Rfl:   ?  escitalopram oxalate (LEXAPRO) 10 MG tablet, TAKE 1 TABLET DAILY, Disp: 90 tablet, Rfl: 3  ?  ferrous gluconate 256 mg (28 mg iron) Tab, Take 1 tablet by mouth daily., Disp: , Rfl:   ?  losartan-hydrochlorothiazide (HYZAAR) 100-25 mg per tablet, TAKE 1 TABLET DAILY, Disp: 90 tablet, Rfl: 3  ?  mirtazapine (REMERON) 30 MG tablet, TAKE 1 TABLET AT BEDTIME, Disp: 90 tablet, Rfl: 3  ?  multivitamin therapeutic (THERAGRAN) tablet, Take 1 tablet by mouth daily., Disp: , Rfl:   ?  omega-3 fatty acids-vitamin E (FISH OIL) 1,000 mg cap, Take 1 capsule by mouth daily., Disp: , Rfl:   ?  saw palmetto fruit 450 mg cap, Take 1 capsule by mouth daily., Disp: , Rfl:   ?  simvastatin (ZOCOR) 40 MG tablet, TAKE 1 TABLET DAILY (DUE FOR LABS), Disp: 90 tablet, Rfl: 2  ?  tamsulosin (FLOMAX) 0.4 mg Cp24, Take 1 capsule (0.4 mg total) by mouth daily., Disp: 90 capsule, Rfl: 3  ?  tamsulosin (FLOMAX) 0.4 mg Cp24, TAKE 1 CAPSULE DAILY, Disp: 90 capsule, Rfl: 3  ?  TOPROL  mg 24 hr tablet, TAKE 1 TABLET DAILY, Disp: 90 tablet, Rfl: 3    SOCIAL HABITS:    History   Smoking Status   ? Former Smoker   ? Packs/day: 0.25   ? Types: Cigarettes   ? Start date: 2/16/1962   Smokeless Tobacco   ? Never Used       History   Alcohol Use No       History   Drug Use No       FAMILY  HISTORY:    Family History   Problem Relation Age of Onset   ? Brain cancer Father       age 76   ? Pancreatic cancer Mother       age 52   ? Cancer Brother      metastatic, unknown primary   ? Heart disease Brother        REVIEW OF SYSTEMS:  12 point ros performed    PE:  There were no vitals taken for this visit.    HEENT:  nl   Chest:  nl  Lungs:  No wheezing  Heart:  Rrr. + pulses palpable at carotid, radial and femoral., bilat. No pop/dp/pt palpable, bilat. No dependent rubor  Abd:  Nl, no AAA  Ext:  No c/c/e  Skin: intact  Neuro:CN2-12 intact, bilaterally except decreased vison right eye                 Strength 5/5 in upper and lower extremities, bilaterally                 Normal speech, swallowing and gait.             LABS:              Invalid input(s): LABALBU             Ref Range & Units 16 12:34 PM   6/27/15  7:52 AM       Cholesterol <=200 mg/dL 155 91R    Triglycerides 0 - 150 mg/dL 85 62R    HDL Cholesterol >=40 mg/dL 36 (L) 30 (L)    LDL Calculated 0 - 130 mg/dL 102 49R              Edmund Henriquez MD  VASCULAR SURGERY     [facility]

## 2021-06-25 NOTE — TELEPHONE ENCOUNTER
"..Reason for Call:  Prior auth / Medication request     Do you use a [facility]?  Name of the pharmacy and phone number for the current request: [facility] [place], [place] - [address] AT Portal to Registered [facility] Sites  [phone]    Name of the medication requested: Spiriva HHLR Cap 18 mcg inhaler.    Other request: pt calling to check on status of PA. He is also wondering if switching his medication over to \"INCURSE\" would eliminate having to do prior auth for what he is currently prescribed. Please call patient to discuss.     Can we leave a detailed message on this number? Yes    Phone number patient can be reached at: Home number on file [phone] (home)    Best Time: ANY    Call taken on 6/10/2021 at 11:13 AM by Kirti Sterling    "

## 2021-06-25 NOTE — TELEPHONE ENCOUNTER
Patient notified that prior authorizations take 7-10 buisness days. I also let him know that we are not sure what the covered alternatives are but advised him to call the back of his insurance card if he would like to find out. We will continue with a PA for now and patient will call his card for more information.  Citlaly Pa CMA ............... 5:27 PM, 06/10/21

## 2021-06-25 NOTE — TELEPHONE ENCOUNTER
PA needed for Spiriva HHLR Cap 18 mcg inhaler. Please begin this process.  Citlaly Pa CMA ............... 9:11 AM, 06/09/21

## 2021-06-25 NOTE — TELEPHONE ENCOUNTER
Medication:  Disp Refills Start End JUMA    tiotropium (SPIRIVA WITH HANDIHALER) 18 mcg inhalation capsule 90 capsule 3 1/5/2021  No   Sig - Route: Place 1 capsule (2 puffs total) into inhaler and inhale daily. Close and press button to crush and then inhale in 2 breaths. - Inhalation       Pharmacy:  [facility] - [place] - [address] AT PORTAL TO REGISTERED [facility] SITES  Last Office Visit:    5/21/21

## 2021-06-25 NOTE — TELEPHONE ENCOUNTER
Central PA team  [phone]  Pool: HE PA MED ([id number])          PA has been initiated.       PA form completed and faxed insurance via Cover My Meds     Key:  [id number]     Medication:  Spiriva HandiHaler 18MCG capsules    Insurance:  [employer]        Response will be received via fax and may take up to 5-10 business days depending on plan

## 2021-06-25 NOTE — TELEPHONE ENCOUNTER
Reason for Call:  Medication or medication refill:    Do you use a [facility]?  Name of the pharmacy and phone number for the current request: [facility]     Name of the medication requested: tiotropium (SPIRIVA WITH HANDIHALER) 18 mcg inhalation capsule    Other request:  Patient wondering if this is an option as an alternative?  Incruse    Can we leave a detailed message on this number? Yes    Phone number patient can be reached at: Home number on file [phone] (home)    Best Time: any    Call taken on 6/4/2021 at 1:13 PM by Laura L Goldberg

## 2021-06-26 NOTE — PROGRESS NOTES
[facility] clinic Follow Up Note    Richie Gordillo   80 y.o. male    Date of Visit: 6/7/2021    Chief Complaint   Patient presents with     Follow-up     2 week recheck after INF     Subjective  Richie is a 80-year-old male here for clinic follow-up after hospitalization on May 17 for increasing shortness of breath and lower extremity edema.  He was treated both for diastolic congestive heart failure and COPD exacerbation.  He was still smoking.  He stated he quit smoking again today.    I did review the heart echo from May 18, 2021 in the hospital.  Aortic stenosis stable at mild to moderate.  Ejection fraction 65%.  No PFO.    He was treated with higher dose of furosemide but back to once a day furosemide after follow-up on May 21.    May 21 labs reviewed with creatinine back to baseline at 1.58.  Potassium 4.2 and magnesium 1.9.    He still on Lasix 40 mg a day and amlodipine 10 mg a day.  No orthostasis.    He is not tolerated lisinopril in the past with elevated creatinine.    He still has some deconditioning, has not gotten back to his regular walking routine.  No claudication symptoms currently but has not been walking regularly.    History of severe peripheral vascular disease.    No chest pain or chest pressure.  Nuclear stress test - January 2021.    He still on Lipitor and aspirin in addition to Xarelto.    Cerebellar infarct in 2015 on imaging.    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    No new palpitations on sotalol twice daily for his paroxysmal atrial fibrillation.    Known bilateral carotid artery stenosis 50-69% on August 2019 ultrasound.  Right iliac stent history.    History of sleep apnea but does not tolerate CPAP.  History of alcohol, no longer drinking regularly.    He is on Spiriva, but not currently using it as it is on back order, he has ordered more.  Using albuterol about twice a day.  He denies that he left the hospital with  doxycycline, no longer on that.  Prednisone for 4 days no longer on.    Uses mirtazapine to sleep.    I did review the chest x-ray from May 17 with no new mass or infiltrate.  Last chest CT scan was March 2020 but he is delaying his yearly chest CT scan screening, want to do that after his physical in August.    I did review the colonoscopy from last month which showed 11-15 mm sessile serrated adenoma as well as an 8 mm polyp and a 16-20 mm polyp.  They did not give him a follow-up plan for his colonoscopy, at age 80.    Past history of squamous cell cancer of the skin, he was due to see dermatology this past January but is overdue.  No new skin lesions.  Patient wants to wait on dermatology appointment till after physical.    History of TURP, urination stable on Flomax, patient requesting refill    No new falls.    PMHx:    Past Medical History:   Diagnosis Date     Acute on chronic diastolic congestive heart failure (H) 5/21/2021     Arthritis      Atrial fibrillation (H)      BPH (benign prostatic hyperplasia)      Central retinal artery occlusion, right eye      Chronic kidney disease     CKD     Essential tremor      History of alcohol abuse     in remission     HTN (hypertension)      Hyperlipidemia      Hyponatremia      Leg mass, left      Murmur      PVD (peripheral vascular disease) (H)      Renal Tubular Acidosis      secondary to bladder outlet obstruction; resolved       Sleep apnea      Stroke (H)     TIA     PSHx:    Past Surgical History:   Procedure Laterality Date     CARDIOVERSION  01/30/2020     CATARACT EXTRACTION       EXTERNAL EAR SURGERY      mastoid     EYE SURGERY       ILIAC ARTERY STENT Right     twice, 2009 and 2010     LEG SKIN LESION  BIOPSY / EXCISION Left 7/27/2017    Procedure: EXCISION LEFT POSTERIOR LEG MASS;  Surgeon: Joseluis Fernandez MD;  Location: [facility];  Service:      IA TRANSURETHRAL ELEC-SURG PROSTATECTOM      Description: Transurethral Resection Of Prostate  (TURP);  Recorded: 04/27/2009;     Immunizations:   Immunization History   Administered Date(s) Administered     COVID-19,PF,Pfizer 02/10/2021, 03/03/2021     DT (pediatric) 09/21/2005     Influenza high dose,seasonal,PF, 65+ yrs 02/13/2019, 09/26/2019     Influenza, inj, historic,unspecified 11/14/2011     Influenza, seasonal,quad inj 6-35 mos 01/14/2013     Pneumo Conj 13-V (2010&after) 04/06/2017     Pneumo Polysac 23-V 03/03/2009     Td,adult,historic,unspecified 09/21/2005     Tdap 09/28/2013     ZOSTER, LIVE 12/08/2011       ROS A comprehensive review of systems was performed and was otherwise negative    Medications, allergies, and problem list were reviewed and updated    Exam  /60   Pulse (!) 58   Wt 137 lb (62.1 kg)   SpO2 98%   BMI 20.83 kg/m    Able to climb up on exam table.  Mild stiffness to back.  Alert and oriented x3.  Lungs with decreased breath sounds, mild.  Mild reduced respiratory excursion.  Mild expiratory wheezing.  There is no crackles or dullness.  No significant lower extremity edema bilaterally, just trace puffiness at most.  Abdomen nontender, thin.  Heart is regular with 3 out of 6 systolic murmur.    Assessment/Plan  1. Chronic obstructive pulmonary disease, unspecified COPD type (H)  I suspect mild exacerbation in May, possibly with some diastolic congestive heart failure with that as well.  History of sleep apnea but does not tolerate CPAP.    Appears back to baseline.  He has agreed to quit smoking again as of today.  Continue albuterol as needed.  He is waiting for his Spiriva prescription to come on order.    Albuterol as needed.  Follow-up for exacerbation.    Plan chest CT scan for screening after August physical.    2. Chronic heart failure with preserved ejection fraction (H)  Appears back to compensated.  Echo was stable in hospital last month.  COPD exacerbation and/sleep apnea exacerbation may have precipitated heart failure condition.    Continue furosemide 40  mg daily.    With his chronic renal insufficiency, does not tolerate ACE inhibitor or ARB.    Tolerating lower blood pressure with amlodipine 10 mg a day.    3. Paroxysmal atrial fibrillation (H)  On sotalol twice daily.  Xarelto, in addition to low-dose aspirin.    4. Carotid artery stenosis, asymptomatic, bilateral  Asymptomatic.  History of cerebellar infarct.  Aspirin in addition to Xarelto.  Lipitor 40 mg.    5. Moderate aortic valve stenosis  Mild to moderate aortic stenosis, stable on echo last month.  Repeat echo in 1 year    6. History of colonic polyps  I would plan to repeat colonoscopy in 1 year given large polyps, x3.  Will evaluate clinical status in 1 year.    7. History of squamous cell carcinoma of skin  Patient was reminded he is due for his dermatology skin exam.  He denies new skin lesions.  He wants to wait until after his August physical to see dermatology.    8. BPH (benign prostatic hyperplasia)  Urination stable, refill given  - tamsulosin (FLOMAX) 0.4 mg cap; Take 1 capsule (0.4 mg total) by mouth daily.  Dispense: 90 capsule; Refill: 3    Low back pain and stiffness, some deconditioning.  I did encourage patient to get back to his daily stretching and walking routine.  He was offered physical therapy referral but he declined.    Return in 2 months (on 8/20/2021) for Annual physical.   Patient Instructions   No change in medication treatment plan at this time.    See me in August 20 for your physical, come fasting.    Get back to your daily walking and back stretching exercise routine.  You can request physical therapy to help you with your regular exercise, if you wish.    Congratulations on quitting smoking.  I will plan to have you proceed with the screening chest CT scan after your August physical.    Plan to see dermatology for your skin cancer follow-up after your August physical.        Justo Tom MD        Current Outpatient Medications   Medication Sig Dispense Refill      albuterol sulfate 90 mcg/actuation AePB Inhale 2 puffs every 4 (four) hours as needed. 1 each 0     amLODIPine (NORVASC) 10 MG tablet Take 1 tablet (10 mg total) by mouth daily. 90 tablet 3     aspirin 81 MG EC tablet Take 1 tablet (81 mg total) by mouth daily. 90 tablet 3     atorvastatin (LIPITOR) 40 MG tablet TAKE 1 TABLET DAILY (THIS REPLACES SIMVASTATIN) 90 tablet 3     cholecalciferol, vitamin D3, 400 unit Tab Take 400 Units by mouth daily.       cyanocobalamin (VITAMIN B-12) 500 MCG tablet Take 500 mcg by mouth daily.       ferrous gluconate 256 mg (28 mg iron) Tab Take 1 tablet by mouth daily.       furosemide (LASIX) 40 MG tablet Twice daily x 2 days then resume once daily on May 24 (Patient taking differently: Take 40 mg by mouth daily. ) 90 tablet 2     mirtazapine (REMERON) 30 MG tablet Take 1 tablet (30 mg total) by mouth at bedtime. 90 tablet 3     multivitamin therapeutic (THERAGRAN) tablet Take 1 tablet by mouth every evening.        omega-3 fatty acids-vitamin E (FISH OIL) 1,000 mg cap Take 1,000 mg by mouth daily.        rivaroxaban ANTICOAGULANT (XARELTO) 15 mg tablet TAKE 1 TABLET DAILY WITH SUPPER 90 tablet 3     sotaloL (BETAPACE) 80 MG tablet Take 1 tablet (80 mg total) by mouth 2 (two) times a day. 180 tablet 3     tiotropium (SPIRIVA WITH HANDIHALER) 18 mcg inhalation capsule Place 1 capsule (2 puffs total) into inhaler and inhale daily. Close and press button to crush and then inhale in 2 breaths. 90 capsule 3     tamsulosin (FLOMAX) 0.4 mg cap Take 1 capsule (0.4 mg total) by mouth daily. 90 capsule 3     No current facility-administered medications for this visit.      Allergies   Allergen Reactions     Ofloxacin Hives     Quinolones Itching     Social History     Tobacco Use     Smoking status: Former Smoker     Packs/day: 0.25     Types: Cigarettes     Start date: 1962     Quit date: 2021     Years since quittin.3     Smokeless tobacco: Never Used     Tobacco comment: Quit  12/2/19 and restarted 7/22/20, and quit again on 1/23/21, started again until quitting for 1 week in 4/2021, and started again on 4/18/21   Substance Use Topics     Alcohol use: No     Comment: hx alcohol abuse in remission     Drug use: No

## 2021-06-26 NOTE — PATIENT INSTRUCTIONS - HE
No change in medication treatment plan at this time.    See me in August 20 for your physical, come fasting.    Get back to your daily walking and back stretching exercise routine.  You can request physical therapy to help you with your regular exercise, if you wish.    Congratulations on quitting smoking.  I will plan to have you proceed with the screening chest CT scan after your August physical.    Plan to see dermatology for your skin cancer follow-up after your August physical.

## 2021-06-28 NOTE — PROGRESS NOTES
Progress Notes by Kika Perez CNP at 12/17/2019  9:10 AM     Author: Kika Perez CNP Service: -- Author Type: Nurse Practitioner    Filed: 12/17/2019 10:36 AM Encounter Date: 12/17/2019 Status: Signed    : Kika Perez CNP (Nurse Practitioner)             Assessment/Recommendations   Assessment:    1.  Heart failure with preserved ejection fraction, NYHA class II: Compensated.  He states his symptoms have improved.  His weights have remained stable since discharge.  His main complaint today is feeling weak.  We discussed monitoring symptoms, following a low-sodium diet, monitoring daily weights, and heart failure treatment.  He met with the nurse clinician for further heart failure education.    2.  Hypertension: Blood pressure low today.  BP 80/54.  He monitors his blood pressure at home and has been on the low end of normal prior to taking medications.    3.  Persistent atrial fibrillation: Rate controlled.  He continues Xarelto for anticoagulation.  He continues Metroprolol, digoxin, diltiazem for rate control.    Plan:  1.  Discontinue losartan due to hypotension  2.  Continue daily weights and low-sodium diet  3.  Encouraged light exercise    Richie Gordillo will follow up with Dr. Perez in March, Clinton Byers January 8 and in the heart failure clinic beginning of February.     History of Present Illness/Subjective    Richie Gordillo is seen at [facility] heart failure clinic today for post-hospitalization follow-up.  His wife Sharmin accompanies him today. He was hospitalized at [facility] from December 2 to December 5, 2019 with shortness of breath.  He was found to have atrial fibrillation with rapid ventricular response.  His BNP was 896.  His metoprolol was increased to 100 mg twice a day.  His heart rate remained poorly controlled therefore digoxin and diltiazem were added to medical regimen.  His heart rate was under control with these medications.  He was  started on Xarelto for anticoagulation.  His Plavix was discontinued and he will continue aspirin.  His amlodipine was also discontinued due to addition of medications.  The most recent evaluation of His ejection fraction was 55% from an  echo on 12/3/2019.  His echocardiogram also showed moderate aortic stenosis.  His past medical history is also significant for hypertension, peripheral vascular disease, atrial fibrillation, dyslipidemia, COPD, chronic kidney disease and substance abuse.  He has quit smoking.    Since being discharged from the hospital, Evert feels that he is improving.  His shortness of breath and lower extremity edema have improved.  His orthopnea has resolved.  He denies chest pain or palpitations.  He does complain of fatigue and weakness. He denies lightheadedness, shortness of breath, orthopnea, PND, palpitations, chest pain, abdominal fullness/bloating and lower extremity edema.      Richie Watkins weight at discharge was 158 pounds.  He is monitoring home weights which are stable between 133-136 pounds.  He is following a low sodium diet.        ECHOCARDIOGRAM: 12/3/2019  Summary     1. Normal left ventricular size and systolic performance with a visually estimated ejection fraction of 55%.   2. There is mild concentric increase in left ventricular wall thickness.   3. There is mild to moderate aortic stenosis.   4. Normal right ventricular size and systolic performance.   5. The left atrium is of normal size.   6. Color Doppler interrogation of the atrial septum reveals the presence of a patent foramen ovale (PFO).     When compared to the prior real-time echocardiogram dated 13 August 2019, the mean gradient obtained across the aortic valve on the current examination is slightly to the previous.          Physical Examination Review of Systems   Vitals:    12/17/19 0907   BP: (!) 80/54   Pulse: 76   Resp: 16     Body mass index is 21.9 kg/m .  Wt Readings from Last 3 Encounters:   12/17/19  143 lb (64.9 kg)   12/06/19 140 lb (63.5 kg)   12/05/19 138 lb 2 oz (62.7 kg)       General Appearance:   no distress, normal body habitus   ENT/Mouth: membranes moist, no oral lesions or bleeding gums.      EYES:  no scleral icterus, normal conjunctivae   Neck: no carotid bruits or thyromegaly   Chest/Lungs:   lungs are clear to auscultation, no rales or wheezing, equal chest wall expansion    Cardiovascular:    Irregularly irregular. Normal first and second heart sounds with no murmurs, rubs, or gallops; Jugular venous pressure normal, no edema    Abdomen:  no organomegaly, masses, bruits, or tenderness; bowel sounds are present   Extremities: no cyanosis or clubbing   Skin: no xanthelasma, warm.    Neurologic: normal  bilateral, no tremors     Psychiatric: alert and oriented x3    General: WNL  Eyes: WNL  Ears/Nose/Throat: WNL  Lungs: WNL  Heart: WNL  Stomach: WNL  Bladder: WNL  Muscle/Joints: WNL  Skin: WNL  Nervous System: WNL  Mental Health: WNL     Blood: WNL     Medical History  Surgical History Family History Social History   Past Medical History:   Diagnosis Date   ? Arthritis    ? BPH (benign prostatic hyperplasia)    ? Central retinal artery occlusion, right eye    ? Chronic kidney disease     CKD   ? Essential tremor    ? History of alcohol abuse     in remission   ? HTN (hypertension)    ? Hyperlipidemia    ? Hyponatremia    ? Leg mass, left    ? Murmur    ? PVD (peripheral vascular disease) (H)    ? Renal Tubular Acidosis      secondary to bladder outlet obstruction; resolved     ? Sleep apnea    ? Stroke (H)     TIA    Past Surgical History:   Procedure Laterality Date   ? CATARACT EXTRACTION     ? EXTERNAL EAR SURGERY      mastoid   ? EYE SURGERY     ? ILIAC ARTERY STENT Right     twice, 2009 and 2010   ? LEG SKIN LESION  BIOPSY / EXCISION Left 7/27/2017    Procedure: EXCISION LEFT POSTERIOR LEG MASS;  Surgeon: oJseluis Fernandez MD;  Location: [facility] OR;  Service:    ? [place] TRANSURETHRAL  ELEC-SURG PROSTATECTOM      Description: Transurethral Resection Of Prostate (TURP);  Recorded: 2009;    Family History   Problem Relation Age of Onset   ? Brain cancer Father          age 76   ? Pancreatic cancer Mother          age 52   ? Cancer Brother         metastatic, unknown primary   ? Heart disease Brother     Social History     Socioeconomic History   ? Marital status:      Spouse name: Not on file   ? Number of children: Not on file   ? Years of education: Not on file   ? Highest education level: Not on file   Occupational History   ? Not on file   Social Needs   ? Financial resource strain: Not on file   ? Food insecurity:     Worry: Not on file     Inability: Not on file   ? Transportation needs:     Medical: Not on file     Non-medical: Not on file   Tobacco Use   ? Smoking status: Former Smoker     Packs/day: 0.50     Types: Cigarettes     Start date: 1962     Last attempt to quit: 2019     Years since quittin.0   ? Smokeless tobacco: Never Used   Substance and Sexual Activity   ? Alcohol use: No     Comment: hx alcohol abuse in remission   ? Drug use: No   ? Sexual activity: Yes     Partners: Female   Lifestyle   ? Physical activity:     Days per week: Not on file     Minutes per session: Not on file   ? Stress: Not on file   Relationships   ? Social connections:     Talks on phone: Not on file     Gets together: Not on file     Attends Pentecostal service: Not on file     Active member of club or organization: Not on file     Attends meetings of clubs or organizations: Not on file     Relationship status: Not on file   ? Intimate partner violence:     Fear of current or ex partner: Not on file     Emotionally abused: Not on file     Physically abused: Not on file     Forced sexual activity: Not on file   Other Topics Concern   ? Not on file   Social History Narrative    He is a retired  and last worked at [employer]. He also was a dental  technician. He is  and has 3 children and 8 grandchildren.          Medications  Allergies   Current Outpatient Medications   Medication Sig Dispense Refill   ? aspirin 81 MG EC tablet Take 1 tablet (81 mg total) by mouth daily. 90 tablet 3   ? atorvastatin (LIPITOR) 40 MG tablet Take 1 tablet (40 mg total) by mouth daily. 90 tablet 2   ? cholecalciferol, vitamin D3, 400 unit Tab Take 400 Units by mouth daily.     ? cyanocobalamin (VITAMIN B-12) 500 MCG tablet Take 500 mcg by mouth daily.     ? digoxin (LANOXIN) 125 mcg (0.125 mg) tablet Take 1 tablet (125 mcg total) by mouth daily with supper. 90 tablet 3   ? diltiazem (CARDIZEM CD) 120 MG 24 hr capsule Take 1 capsule (120 mg total) by mouth daily. 90 capsule 3   ? ferrous gluconate 256 mg (28 mg iron) Tab Take 1 tablet by mouth daily.     ? furosemide (LASIX) 40 MG tablet Take 1 tablet (40 mg total) by mouth daily. 90 tablet 3   ? mirtazapine (REMERON) 30 MG tablet Take 1 tablet (30 mg total) by mouth at bedtime. 90 tablet 3   ? multivitamin therapeutic (THERAGRAN) tablet Take 1 tablet by mouth every evening.      ? omega-3 fatty acids-vitamin E (FISH OIL) 1,000 mg cap Take 1,000 mg by mouth every evening.      ? rivaroxaban (XARELTO) 15 mg tablet Take 1 tablet (15 mg total) by mouth daily with supper. 90 tablet 3   ? tamsulosin (FLOMAX) 0.4 mg cap Take 1 capsule (0.4 mg total) by mouth daily. 90 capsule 3   ? tiotropium (SPIRIVA WITH HANDIHALER) 18 mcg inhalation capsule Place 1 capsule (2 puffs total) into inhaler and inhale daily. Close and press button to crush and then inhale in 2 breaths. 90 capsule 5   ? TOPROL  mg 24 hr tablet Take 1 tablet (100 mg total) by mouth 2 (two) times a day. TAKE 1 TABLET DAILY 180 tablet 3   ? vitamin E acetate (VITAMIN E ORAL) Take 1 tablet by mouth daily.     ? guaiFENesin ER (MUCINEX) 600 mg 12 hr tablet Take 1,200 mg by mouth 2 (two) times a day as needed (Using 1-2 times per week as needed.).              No  current facility-administered medications for this visit.     Allergies   Allergen Reactions   ? Ofloxacin Hives   ? Quinolones Itching         Lab Results    Chemistry/lipid CBC Cardiac Enzymes/BNP/TSH/INR   Lab Results   Component Value Date    CHOL 95 08/07/2019    HDL 33 (L) 08/07/2019    LDLCALC 46 08/07/2019    TRIG 81 08/07/2019    CREATININE 1.75 (H) 12/06/2019    BUN 24 12/06/2019    K 4.1 12/06/2019     12/06/2019    CL 97 (L) 12/06/2019    CO2 32 (H) 12/06/2019    Lab Results   Component Value Date    WBC 7.0 12/05/2019    HGB 12.0 (L) 12/05/2019    HCT 37.1 (L) 12/05/2019    MCV 93 12/05/2019     12/05/2019    Lab Results   Component Value Date    CKTOTAL 120 08/07/2019    TROPONINI 0.04 12/02/2019    BNP 1,399 (H) 12/06/2019    TSH 1.40 12/02/2019    INR 0.90 06/27/2015           This note has been dictated using voice recognition software. Any grammatical, typographical, or context distortions are unintentional and inherent to the software

## 2021-06-28 NOTE — PROGRESS NOTES
Progress Notes by Kika Perez CNP at 2/18/2020  9:50 AM     Author: Kika Perez CNP Service: -- Author Type: Nurse Practitioner    Filed: 2/18/2020 10:22 AM Encounter Date: 2/18/2020 Status: Signed    : Kika Perez CNP (Nurse Practitioner)             Assessment/Recommendations   Assessment:    1.  Heart failure with preserved ejection fraction, NYHA class II: Compensated.  He continues to have fatigue and dyspnea on exertion.  His weights have remained stable.  He follows a low-sodium diet.  2.  Persistent atrial fibrillation: Rate controlled.  He had a cardioversion January 30, 2020 which was successful.  Unfortunately, he is back in atrial fibrillation today based on EKG.  Heart rate of 82 bpm.  Continues Xarelto for anticoagulation and metoprolol for rate control.  He states he had more energy following cardioversion.  He is unsure of when he went back in atrial fibrillation.  I will discuss this with Bailey.  3.  Hypertension: Controlled.  Blood pressure 110/54    Plan:  1.  Continue current medications  2.  Continue daily weights and low-sodium diet  3.  Discuss further plan for atrial fibrillation with Bailey    Richie Gordillo will follow up with Dr. Perez in March/April and in the heart failure clinic in 3 months.     History of Present Illness/Subjective    Mr. Richie Gordillo is a 79 y.o. male seen at [facility] heart failure clinic today for continued follow-up.  He follows up for heart failure with preserved ejection fraction.  He had an echocardiogram December 3, 2019 which showed an ejection fraction of 55% and moderate aortic stenosis.  He has a past medical history significant for hypertension, peripheral vascular disease, persistent atrial fibrillation, dyslipidemia, COPD, chronic kidney disease, and tobacco use.  He quit smoking.  He had a cardioversion on January 30, 2020 which was successful.  Today, he is back in atrial  fibrillation unfortunately.    Today, he comes in with continued fatigue and dyspnea on exertion.  After his cardioversion he states he felt he had more energy.  Unfortunately, he is back in atrial fibrillation with a controlled heart rate.  He states he does not know exactly when he went back into atrial fibrillation.  He denies orthopnea, PND or chest pain.  He denies bloating or lower extremity edema.  He denies lightheadedness, shortness of breath, orthopnea, PND, palpitations, chest pain, abdominal fullness/bloating and lower extremity edema.      He is monitoring home weights which are stable between 135-138 pounds.  He is following a low sodium diet.         Physical Examination Review of Systems   Vitals:    02/18/20 0949   BP: 110/54   Pulse: 76   Resp: 16     Body mass index is 21.74 kg/m .  Wt Readings from Last 3 Encounters:   02/18/20 143 lb (64.9 kg)   01/30/20 142 lb (64.4 kg)   01/24/20 141 lb 4.8 oz (64.1 kg)       General Appearance:   no acute distress   ENT/Mouth: membranes moist, no oral lesions or bleeding gums.      EYES:  no scleral icterus, normal conjunctivae   Neck: no carotid bruits or thyromegaly   Chest/Lungs:   lungs are clear to auscultation, no rales or wheezing, equal chest wall expansion    Cardiovascular:    Irregularly irregular. Normal first and second heart sounds with no murmurs, rubs, or gallops; Jugular venous pressure normal, no edema   Abdomen:  no organomegaly, masses, bruits, or tenderness; bowel sounds are present   Extremities: no cyanosis or clubbing   Skin: no xanthelasma, warm.    Neurologic: normal  bilateral, no tremors     Psychiatric: alert and oriented x3    General: WNL  Eyes: WNL  Ears/Nose/Throat: WNL  Lungs: WNL  Heart: WNL  Stomach: WNL  Bladder: WNL  Muscle/Joints: Muscle Weakness  Skin: WNL  Nervous System: Daytime Sleepiness, Loss of Balance  Mental Health: WNL     Blood: Easy Bruising     Medical History  Surgical History Family History Social  History   Past Medical History:   Diagnosis Date   ? Arthritis    ? Atrial fibrillation (H)    ? BPH (benign prostatic hyperplasia)    ? Central retinal artery occlusion, right eye    ? Chronic kidney disease     CKD   ? Essential tremor    ? History of alcohol abuse     in remission   ? HTN (hypertension)    ? Hyperlipidemia    ? Hyponatremia    ? Leg mass, left    ? Murmur    ? PVD (peripheral vascular disease) (H)    ? Renal Tubular Acidosis      secondary to bladder outlet obstruction; resolved     ? Sleep apnea    ? Stroke (H)     TIA    Past Surgical History:   Procedure Laterality Date   ? CARDIOVERSION  2020   ? CATARACT EXTRACTION     ? EXTERNAL EAR SURGERY      mastoid   ? EYE SURGERY     ? ILIAC ARTERY STENT Right     twice,  and    ? LEG SKIN LESION  BIOPSY / EXCISION Left 2017    Procedure: EXCISION LEFT POSTERIOR LEG MASS;  Surgeon: Joseluis Fernandez MD;  Location: [facility];  Service:    ? [place] TRANSURETHRAL ELEC-SURG PROSTATECTOM      Description: Transurethral Resection Of Prostate (TURP);  Recorded: 2009;    Family History   Problem Relation Age of Onset   ? Brain cancer Father          age 76   ? Pancreatic cancer Mother          age 52   ? Cancer Brother         metastatic, unknown primary   ? Heart disease Brother     Social History     Socioeconomic History   ? Marital status:      Spouse name: Not on file   ? Number of children: Not on file   ? Years of education: Not on file   ? Highest education level: Not on file   Occupational History   ? Not on file   Social Needs   ? Financial resource strain: Not on file   ? Food insecurity:     Worry: Not on file     Inability: Not on file   ? Transportation needs:     Medical: Not on file     Non-medical: Not on file   Tobacco Use   ? Smoking status: Former Smoker     Packs/day: 0.50     Types: Cigarettes     Start date: 1962     Last attempt to quit: 2019     Years since quittin.2   ?  Smokeless tobacco: Never Used   Substance and Sexual Activity   ? Alcohol use: No     Comment: hx alcohol abuse in remission   ? Drug use: No   ? Sexual activity: Yes     Partners: Female   Lifestyle   ? Physical activity:     Days per week: Not on file     Minutes per session: Not on file   ? Stress: Not on file   Relationships   ? Social connections:     Talks on phone: Not on file     Gets together: Not on file     Attends Restorationism service: Not on file     Active member of club or organization: Not on file     Attends meetings of clubs or organizations: Not on file     Relationship status: Not on file   ? Intimate partner violence:     Fear of current or ex partner: Not on file     Emotionally abused: Not on file     Physically abused: Not on file     Forced sexual activity: Not on file   Other Topics Concern   ? Not on file   Social History Narrative    He is a retired  and last worked at [employer]. He also was a dental technician. He is  and has 3 children and 8 grandchildren.          Medications  Allergies   Current Outpatient Medications   Medication Sig Dispense Refill   ? aspirin 81 MG EC tablet Take 1 tablet (81 mg total) by mouth daily. 90 tablet 3   ? atorvastatin (LIPITOR) 40 MG tablet Take 1 tablet (40 mg total) by mouth daily. 90 tablet 2   ? cholecalciferol, vitamin D3, 400 unit Tab Take 400 Units by mouth daily.     ? cyanocobalamin (VITAMIN B-12) 500 MCG tablet Take 500 mcg by mouth daily.     ? ferrous gluconate 256 mg (28 mg iron) Tab Take 1 tablet by mouth daily.     ? furosemide (LASIX) 40 MG tablet Take 1 tablet (40 mg total) by mouth daily. 90 tablet 3   ? guaiFENesin ER (MUCINEX) 600 mg 12 hr tablet Take 1,200 mg by mouth 2 (two) times a day as needed (Using 1-2 times per week as needed.).            ? mirtazapine (REMERON) 30 MG tablet Take 1 tablet (30 mg total) by mouth at bedtime. 90 tablet 3   ? multivitamin therapeutic (THERAGRAN) tablet Take 1 tablet by  mouth every evening.      ? omega-3 fatty acids-vitamin E (FISH OIL) 1,000 mg cap Take 1,000 mg by mouth every evening.      ? rivaroxaban (XARELTO) 15 mg tablet Take 1 tablet (15 mg total) by mouth daily with supper. 90 tablet 3   ? tamsulosin (FLOMAX) 0.4 mg cap Take 1 capsule (0.4 mg total) by mouth daily. 90 capsule 3   ? tiotropium (SPIRIVA WITH HANDIHALER) 18 mcg inhalation capsule Place 1 capsule (2 puffs total) into inhaler and inhale daily. Close and press button to crush and then inhale in 2 breaths. 90 capsule 5   ? TOPROL  mg 24 hr tablet Take 1 tablet (100 mg total) by mouth every evening. 14 tablet 0   ? vitamin E acetate (VITAMIN E ORAL) Take 1 tablet by mouth daily.       No current facility-administered medications for this visit.     Allergies   Allergen Reactions   ? Ofloxacin Hives   ? Quinolones Itching         Lab Results    Chemistry/lipid CBC Cardiac Enzymes/BNP/TSH/INR   Lab Results   Component Value Date    CHOL 95 08/07/2019    HDL 33 (L) 08/07/2019    LDLCALC 46 08/07/2019    TRIG 81 08/07/2019    CREATININE 1.99 (H) 01/24/2020    BUN 28 01/24/2020    K 4.4 01/30/2020     01/24/2020     01/24/2020    CO2 32 (H) 01/24/2020    Lab Results   Component Value Date    WBC 7.0 12/05/2019    HGB 12.0 (L) 12/05/2019    HCT 37.1 (L) 12/05/2019    MCV 93 12/05/2019     12/05/2019    Lab Results   Component Value Date    CKTOTAL 120 08/07/2019    TROPONINI 0.04 12/02/2019    BNP 1,399 (H) 12/06/2019    TSH 1.40 12/02/2019    INR 0.90 06/27/2015             This note has been dictated using voice recognition software. Any grammatical, typographical, or context distortions are unintentional and inherent to the software

## 2021-06-28 NOTE — PROGRESS NOTES
Progress Notes by Michelle Byers CNP at 1/8/2020  9:50 AM     Author: Michelle Byers CNP Service: -- Author Type: Nurse Practitioner    Filed: 1/8/2020 12:09 PM Encounter Date: 1/8/2020 Status: Signed    : Michelle Byers CNP (Nurse Practitioner)            Thank you, Dr. Tom, for asking the [facility] team to see Mr. Richie Gordillo to evaluate new persistent atrial fibrillation.    Assessment/Recommendations     Assessment/Plan:    Diagnoses and all orders for this visit:    Persistent atrial fibrillation and reporting potential indirect symptoms with atrial fib.  Left atrium was normal size on echo during hospitalization.  No previous history of A. fib per patient.  I discussed general incidence of atrial fib and natural progression.  I discussed treatment of A. fib is dependent upon whether he is symptomatic or not.  He is at high risk for A. fib due to medical history especially valvular heart disease.  I suspect he will have recurring episodes of atrial fib and important to determine if he is symptomatic.  He appears to have good rate control on current medications of digoxin 125 mcg p.o. daily, diltiazem 120 mg p.o. daily and metoprolol succinate 100 mg p.o. twice daily.  He had mild sinus bradycardia on metoprolol succinate 100 mg daily in September.  Therefore the a.m. of cardioversion he is to stop a.m. metoprolol, diltiazem and digoxin.  To remain off of diltiazem and digoxin and lower metoprolol succinate to 100 mg daily only.  These medication instructions were reviewed with him but he tells me that this needs to be reviewed with his wife in detail before the cardioversion as she does his meds.  Communicated to my nurse.  Due to long smoking history and COPD diagnosis on his chart he is not a good candidate for amiodarone.  Due to chronic kidney disease with creatinine normally 1.6-1.8 he is not a good candidate for renal cleared antiarrhythmics as well.   If heart failure resolves Multaq may be best option to start with if antiarrhythmic needed in the future.  -     EP Cardioversion External; Future; Expected date: 01/09/2020    Hypertension and blood pressure well below goal today.  With discontinuation of heart rate medications and significant decrease in metoprolol dose at time of cardioversion will add back amlodipine 5 mg 1 tab p.o. daily that morning.  Will need reassessment of blood pressure control at time of cardioversion and also on follow-up in heart failure clinic.    Obstructive Sleep Apnea untreated.    Chronic heart failure with preserved ejection fraction (H) and no signs or symptoms of decompensated heart failure today.  Will continue current diuretic of furosemide 40 mg p.o. daily.    Other orders  -     Vital signs; Standing  -     Cardiac monitoring; Standing  -     NPO 8 hours prior to procedure; Standing  -     Notify Anesthesiologist -; Standing  -     Verify 100% compliance with oral anti-coagulant over the past 3 weeks verbally with the patient prior to cardioversion. Notify procedularlist if missed doses.; Standing  -     Emergency equipment at bedside; Standing  -     Oxygen simple mask; Standing  -     Inpatient consult to Anesthesiology Reason for Consult? Cardioversion; Did you contact the consulting MD? Yes; Consult priority: Today (routine); Communication for MD: No phone communication necessary for now; Standing  -     Insert and maintain IV; Standing  -     lidocaine (PF) 10 mg/mL (1 %) injection 0.1-0.3 mL (XYLOCAINE-MPF)  -     sodium chloride 0.9%  -     Saline lock IV; Standing  -     sodium chloride flush 3 mL (NS)  -     Verify informed consent cardioversion; Standing  -     Potassium; Standing      [id number] score of 7+ with mild to moderate aortic stenosis and on renal dose Xarelto, which is appropriate due to creatinine 1.75 on December 6.  Follow up in clinic with Kika Eduardo NP as planned on February 18 and will  be follow-up post cardioversion.  Note sent to her to verify rhythm and document if symptom improvement if maintain sinus rhythm.  To refer back to A. fib clinic as needed.     History of Present Illness/Subjective     Richie Gordillo is a very pleasant 79 y.o. male who comes in today for EP consult regarding newly diagnosed paroxysmal atrial fibrillation which was found incidentally on clinic visit with Dr. Tom.  He was admitted to the hospital due to A. fib with RVR and acute heart failure symptoms.  Richie Gordillo has a known history of pulmonary hypertension, hypertension, peripheral vascular disease, stroke, COPD, chronic kidney disease and bilateral carotid stenosis.  Evert denies any previous history of atrial fib.  He came to the visit alone.  His wife does his medications.  Evert has continued to abstain from smoking since hospitalization.  He tells me that it still difficult at times not to smoke.  I reassured him that this was a great accomplishment for improvement in health.  He complains of fatigue and short of breath with stairs for weeks to few months at most.  He denies any palpitations.  He denies any PND, peripheral edema or wheezing.  He is also dealing with an eye problem which has been difficult for him.    Cardiographics (reviewed):  Results for orders placed during the hospital encounter of 12/02/19   Echo Complete [ECH10] 12/03/2019    Narrative 1. Normal left ventricular size and systolic performance with a visually   estimated ejection fraction of 55%.   2. There is mild concentric increase in left ventricular wall thickness.   3. There is mild to moderate aortic stenosis.   4. Normal right ventricular size and systolic performance.   5. The left atrium is of normal size.   6. Color Doppler interrogation of the atrial septum reveals the presence   of a patent foramen ovale (PFO).    When compared to the prior real-time echocardiogram dated 13 August 2019,   the mean gradient obtained across  the aortic valve on the current   examination is slightly to the previous.     Cardiac testing personally reviewed:  September 2019 primary clinic note reviewed with regard to previous medications.  October 16, 2019 twelve-lead EKG shows sinus bradycardia 56 with first-degree AV block on metoprolol succinate 100 mg p.o. daily.  Results for orders placed or performed during the hospital encounter of 12/02/19   ECG 12 lead - in AM   Result Value Ref Range    SYSTOLIC BLOOD PRESSURE      DIASTOLIC BLOOD PRESSURE      VENTRICULAR RATE 119 BPM    ATRIAL RATE 127 BPM    P-R INTERVAL      QRS DURATION 82 ms    Q-T INTERVAL 324 ms    QTC CALCULATION (BEZET) 455 ms    P Axis      R AXIS 24 degrees    T AXIS 78 degrees    MUSE DIAGNOSIS       Atrial flutter with rapid ventricular response  Abnormal ECG  When compared with ECG of 02-DEC-2019 17:11,  No significant change was found  Confirmed by ANN PERDOMO MD LOC:[facility] ([id number]) on 12/3/2019 2:57:02 PM            Problem List:  Patient Active Problem List   Diagnosis   ? Alcohol Abuse - In Remission   ? Chronic kidney disease, stage III (moderate) (H)   ? Other hyperlipidemia   ? Hypertension   ? Peripheral vascular disease (H)   ? Homocysteinemia   ? Obstructive Sleep Apnea   ? Familial (Benign Essential) Tremor   ? Benign Prostatic Hypertrophy   ? Benign Polyps Of The Large Intestine   ? 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   ? Hyponatremia   ? Innocent heart murmur   ? Depression   ? Pulmonary emphysema, unspecified emphysema type (H)   ? History of colonic polyps   ? History of stroke   ? Encounter for therapeutic drug monitoring   ? Carotid artery stenosis, asymptomatic, bilateral   ? Aortic stenosis, moderate   ? Persistent atrial fibrillation   ? Bilateral carotid artery stenosis   ? Chronic heart failure with preserved ejection fraction (H)     Revi  e  Physical Examination Review of Systems   w [facility]  Vitals:    01/08/20 0944   BP: 110/70   Pulse: 68    Resp: 16     Body mass index is 23.28 kg/m .  Wt Readings from Last 3 Encounters:   01/08/20 152 lb (68.9 kg)   12/17/19 143 lb (64.9 kg)   12/06/19 140 lb (63.5 kg)     General Appearance:   Alert, well-appearing and in no acute distress.   HEENT: Atraumatic, normocephalic.  No scleral icterus, normal conjunctivae; mucous membranes pink and moist.     Chest: Chest symmetric, spine straight.   Lungs:   Respirations unlabored: Lungs are clear to auscultation.   Cardiovascular:   Normal first and second heart sounds with no rubs, or gallops.  Irregular, irregular and 3 of 5 systolic murmur.  Radial and posterior tibial pulses are intact.  Normal JVD, no edema.       Extremities: No cyanosis or clubbing   Musculoskeletal: Moves all extremities   Skin: Warm, dry, intact.    Neurologic: Mood and affect are appropriate, alert and oriented to person, place, time, and situation    General: WNL  Eyes: WNL  Ears/Nose/Throat: WNL  Lungs: WNL  Heart: WNL  Stomach: Constipation  Bladder: WNL  Muscle/Joints: Muscle Weakness  Skin: WNL  Nervous System: Daytime Sleepiness, Loss of Balance  Mental Health: WNL     Blood: Easy Bruising       Medical History  Surgical History Family History Social History     Past Medical History:   Diagnosis Date   ? Arthritis    ? BPH (benign prostatic hyperplasia)    ? Central retinal artery occlusion, right eye    ? Chronic kidney disease     CKD   ? Essential tremor    ? History of alcohol abuse     in remission   ? HTN (hypertension)    ? Hyperlipidemia    ? Hyponatremia    ? Leg mass, left    ? Murmur    ? PVD (peripheral vascular disease) (H)    ? Renal Tubular Acidosis      secondary to bladder outlet obstruction; resolved     ? Sleep apnea    ? Stroke (H)     TIA    Past Surgical History:   Procedure Laterality Date   ? CATARACT EXTRACTION     ? EXTERNAL EAR SURGERY      mastoid   ? EYE SURGERY     ? ILIAC ARTERY STENT Right     twice, 2009 and 2010   ? LEG SKIN LESION  BIOPSY / EXCISION  Left 2017    Procedure: EXCISION LEFT POSTERIOR LEG MASS;  Surgeon: Joseluis Fernandez MD;  Location: [facility];  Service:    ? MN TRANSURETHRAL ELEC-SURG PROSTATECTOM      Description: Transurethral Resection Of Prostate (TURP);  Recorded: 2009;    Family History   Problem Relation Age of Onset   ? Brain cancer Father          age 76   ? Pancreatic cancer Mother          age 52   ? Cancer Brother         metastatic, unknown primary   ? Heart disease Brother     Social History     Tobacco Use   Smoking Status Former Smoker   ? Packs/day: 0.50   ? Types: Cigarettes   ? Start date: 1962   ? Last attempt to quit: 2019   ? Years since quittin.1   Smokeless Tobacco Never Used     Social History     Substance and Sexual Activity   Alcohol Use No    Comment: hx alcohol abuse in remission        Medications  Allergies     Current Outpatient Medications   Medication Sig Dispense Refill   ? aspirin 81 MG EC tablet Take 1 tablet (81 mg total) by mouth daily. 90 tablet 3   ? atorvastatin (LIPITOR) 40 MG tablet Take 1 tablet (40 mg total) by mouth daily. 90 tablet 2   ? cholecalciferol, vitamin D3, 400 unit Tab Take 400 Units by mouth daily.     ? cyanocobalamin (VITAMIN B-12) 500 MCG tablet Take 500 mcg by mouth daily.     ? digoxin (LANOXIN) 125 mcg (0.125 mg) tablet Take 1 tablet (125 mcg total) by mouth daily with supper. 90 tablet 3   ? diltiazem (CARDIZEM CD) 120 MG 24 hr capsule Take 1 capsule (120 mg total) by mouth daily. 90 capsule 3   ? ferrous gluconate 256 mg (28 mg iron) Tab Take 1 tablet by mouth daily.     ? furosemide (LASIX) 40 MG tablet Take 1 tablet (40 mg total) by mouth daily. 90 tablet 3   ? guaiFENesin ER (MUCINEX) 600 mg 12 hr tablet Take 1,200 mg by mouth 2 (two) times a day as needed (Using 1-2 times per week as needed.).            ? mirtazapine (REMERON) 30 MG tablet Take 1 tablet (30 mg total) by mouth at bedtime. 90 tablet 3   ? multivitamin therapeutic  (THERAGRAN) tablet Take 1 tablet by mouth every evening.      ? omega-3 fatty acids-vitamin E (FISH OIL) 1,000 mg cap Take 1,000 mg by mouth every evening.      ? rivaroxaban (XARELTO) 15 mg tablet Take 1 tablet (15 mg total) by mouth daily with supper. 90 tablet 3   ? tamsulosin (FLOMAX) 0.4 mg cap Take 1 capsule (0.4 mg total) by mouth daily. 90 capsule 3   ? tiotropium (SPIRIVA WITH HANDIHALER) 18 mcg inhalation capsule Place 1 capsule (2 puffs total) into inhaler and inhale daily. Close and press button to crush and then inhale in 2 breaths. 90 capsule 5   ? TOPROL  mg 24 hr tablet Take 1 tablet (100 mg total) by mouth 2 (two) times a day. TAKE 1 TABLET DAILY 180 tablet 3   ? vitamin E acetate (VITAMIN E ORAL) Take 1 tablet by mouth daily.       No current facility-administered medications for this visit.       Allergies   Allergen Reactions   ? Ofloxacin Hives   ? Quinolones Itching      Medical, surgical, family, social history, and medications were all reviewed and updated as necessary.   Lab Results    Chemistry/lipid CBC Cardiac Enzymes/BNP/TSH/INR     Lab Results   Component Value Date    CREATININE 1.75 (H) 12/06/2019    BUN 24 12/06/2019     12/06/2019    K 4.1 12/06/2019    CL 97 (L) 12/06/2019    CO2 32 (H) 12/06/2019     Creatinine (mg/dL)   Date Value   12/06/2019 1.75 (H)   12/05/2019 1.68 (H)   12/04/2019 1.49 (H)   12/03/2019 1.46 (H)     Lab Results   Component Value Date    BNP 1,399 (H) 12/06/2019    Lab Results   Component Value Date    WBC 7.0 12/05/2019    HGB 12.0 (L) 12/05/2019    HCT 37.1 (L) 12/05/2019    MCV 93 12/05/2019     12/05/2019     Lab Results   Component Value Date    INR 0.90 06/27/2015      Lab Results   Component Value Date    CHOL 95 08/07/2019    HDL 33 (L) 08/07/2019    LDLCALC 46 08/07/2019    TRIG 81 08/07/2019          Total Time- 45 minutes with greater than 50% spent talking to patient and family regarding patient's relevant diagnoses.  This note  has been dictated using voice recognition software. Any grammatical, typographical, or context distortions are unintentional and inherent to the software.

## 2021-06-29 NOTE — PROGRESS NOTES
Progress Notes by Oliva Coleman, RN at 7/9/2020 10:30 AM     Author: Oliva Coleman RN Service: -- Author Type: Registered Nurse    Filed: 7/10/2020  9:09 AM Encounter Date: 7/9/2020 Status: Signed    : Oliva Coleman RN (Registered Nurse)       Renee Gonzales CNP Holen, Megan L RN; P [facility] Rn Support Pool    Cc: Kika Perez CNP               Ask him to take just 5 mg of amlodipine from now until apmt with Kika and keep a log to discuss with her 7/14.

## 2021-06-29 NOTE — PROGRESS NOTES
"Progress Notes by Fabian Perez DO at 5/13/2020  3:10 PM     Author: Fabian Perez DO Service: -- Author Type: Physician    Filed: 5/13/2020  3:23 PM Encounter Date: 5/13/2020 Status: Signed    : Fabian Perez DO (Physician)           The patient has been notified of following:     \"This telephone visit will be conducted via a call between you and your physician/provider. We have found that certain health care needs can be provided without the need for a physical exam.  This service lets us provide the care you need with a phone conversation.  If a prescription is necessary we can send it directly to your pharmacy.  If lab work is needed we can place an order for that and you can then stop by our lab to have the test done at a later time. If during the course of the call the physician/provider feels a telephone visit is not appropriate, you will not be charged for this service.\" Verbal consent has been obtained for this service by care team member:         HEART CARE PHONE ENCOUNTER        The patient has chosen to have the visit conducted as a telephone visit, to reduce risk of exposure given the current status of Coronavirus in our community. This telephone visit is being conducted via a call between the patient and physician/provider. Health care needs are being provided without a physical exam.     Assessment/Recommendations   Assessment:    1. Atrial fib(course)/flutter . CHADSVASC: 7 (Age-2, HTN, PVD, CVA-2, CHF).  On Xarelto   2. Chronic congestive heart failure with preserved ejection fraction (LVEF:55%). BNP:>800.  + orthopnea, severe exertional dyspnea (NYHA late III) and edema.   3. Moderate aortic stenosis (LILIA:1.3 cm2, mean gradient> 23, peak fern:2.9 m/sec)  4. Hypertension, mildly elevated.    5. Prior CVA  6.  Moderate bilateral carotid artery disease   7. COPD   8. PVD   9. CKD stage III (CR:1.4-1.5)  10. Mild LVH      Plan:  1. Continue sotalol  at 80 mg twice " daily for rhythm control of A. Fib.  Needs twelve-lead EKG at next clinical evaluation  3. Agree with Xarelto for stroke preventions. 15 mg.   4. lasix to 40 mg daily PO for congestive heart failure.   5. continue ASA for history of CVA and carotid artery disease  6.  Continue atorvastatin for carotid artery disease, LDL at less than 70 which is his goal.  Recent LDL was 46 reviewed       Follow Up Plan: 6 months  I have reviewed the note as documented.  This accurately captures the substance of my conversation with the patient.    Total time of call between patient and provider was 18 minutes   Start Time:2:58 PM  Stop Time:3:16 PM       History of Present Illness/Subjective    Richie Gordillo is a 79 y.o. male who is being evaluated via a billable telephone visit.      Since last clinical evaluation the patient's has been seen multiple occasions by our heart team.  He was seen recently in April by our heart care A. fib specialist at that time and noticed exertional dyspnea and some fatigue issues.  He did undergo therapy with sotalol which has maintained his rhythm.  He is doing well on Xarelto with no bleeding issues.  Also continues on Lasix and notes no significant orthopnea or lower extremity edema which she had when I initially saw him in initial consultation.  Fatigue continues to plague him a little bit but he feels it is not out of proportion and would like to continue to monitor it as he becomes more active in the spring.  Continues to socially distance for the COVID-19 teen pandemic.  He has not a recent fever chills or sweats.  Overall he feels his condition is relatively stable and is tolerating his medications as prescribed.    ECHO:   1. Normal left ventricular size and systolic performance with a visually estimated ejection fraction of 55%.   2. There is mild concentric increase in left ventricular wall thickness.   3. There is mild to moderate aortic stenosis.   4. Normal right ventricular size and  systolic performance.   5. The left atrium is of normal size.   6. Color Doppler interrogation of the atrial septum reveals the presence of a patent foramen ovale (PFO).     When compared to the prior real-time echocardiogram dated 13 August 2019, the mean gradient obtained across the aortic valve on the current examination is slightly to the previous.    I have reviewed and updated the patient's Past Medical History, Social History, Family History and Medication List.     Physical Examination not performed given phone encounter Review of Systems      Review of Systems - History obtained from the patient  General ROS: Take  Psychological ROS: negative  Ophthalmic ROS: negative  ENT ROS: negative  Allergy and Immunology ROS: negative  Hematological and Lymphatic ROS: negative  Endocrine ROS: negative  Respiratory ROS: positive for - shortness of breath (mild).  Cardiovascular ROS: positive for - dyspnea on exertion, mild, stable negative for - chest pain, edema, irregular heartbeat, loss of consciousness, orthopnea or rapid heart rate  Gastrointestinal ROS: no abdominal pain, change in bowel habits, or black or bloody stools  Genito-Urinary ROS: no dysuria, trouble voiding, or hematuria  Musculoskeletal ROS: negative  Neurological ROS: no TIA or stroke symptoms  Dermatological ROS: negative       Medical History  Surgical History Family History Social History   Past Medical History:   Diagnosis Date   ? Arthritis    ? Atrial fibrillation (H)    ? BPH (benign prostatic hyperplasia)    ? Central retinal artery occlusion, right eye    ? Chronic kidney disease     CKD   ? Essential tremor    ? History of alcohol abuse     in remission   ? HTN (hypertension)    ? Hyperlipidemia    ? Hyponatremia    ? Leg mass, left    ? Murmur    ? PVD (peripheral vascular disease) (H)    ? Renal Tubular Acidosis      secondary to bladder outlet obstruction; resolved     ? Sleep apnea    ? Stroke (H)     TIA    Past Surgical History:    Procedure Laterality Date   ? CARDIOVERSION  2020   ? CATARACT EXTRACTION     ? EXTERNAL EAR SURGERY      mastoid   ? EYE SURGERY     ? ILIAC ARTERY STENT Right     twice,  and    ? LEG SKIN LESION  BIOPSY / EXCISION Left 2017    Procedure: EXCISION LEFT POSTERIOR LEG MASS;  Surgeon: Joseluis Fernandez MD;  Location: [facility];  Service:    ? TX TRANSURETHRAL ELEC-SURG PROSTATECTOM      Description: Transurethral Resection Of Prostate (TURP);  Recorded: 2009;    Family History   Problem Relation Age of Onset   ? Brain cancer Father          age 76   ? Pancreatic cancer Mother          age 52   ? Cancer Brother         metastatic, unknown primary   ? Heart disease Brother     Social History     Socioeconomic History   ? Marital status:      Spouse name: Not on file   ? Number of children: Not on file   ? Years of education: Not on file   ? Highest education level: Not on file   Occupational History   ? Not on file   Social Needs   ? Financial resource strain: Not on file   ? Food insecurity     Worry: Not on file     Inability: Not on file   ? Transportation needs     Medical: Not on file     Non-medical: Not on file   Tobacco Use   ? Smoking status: Former Smoker     Packs/day: 0.50     Types: Cigarettes     Start date: 1962     Last attempt to quit: 2019     Years since quittin.4   ? Smokeless tobacco: Never Used   Substance and Sexual Activity   ? Alcohol use: No     Comment: hx alcohol abuse in remission   ? Drug use: No   ? Sexual activity: Yes     Partners: Female   Lifestyle   ? Physical activity     Days per week: Not on file     Minutes per session: Not on file   ? Stress: Not on file   Relationships   ? Social connections     Talks on phone: Not on file     Gets together: Not on file     Attends [employer] service: Not on file     Active member of club or organization: Not on file     Attends meetings of clubs or organizations: Not on file      Relationship status: Not on file   ? Intimate partner violence     Fear of current or ex partner: Not on file     Emotionally abused: Not on file     Physically abused: Not on file     Forced sexual activity: Not on file   Other Topics Concern   ? Not on file   Social History Narrative    He is a retired  and last worked at [employer]. He also was a dental technician. He is  and has 3 children and 8 grandchildren.          Medications  Allergies   Current Outpatient Medications   Medication Sig Dispense Refill   ? amLODIPine (NORVASC) 10 MG tablet Take 1 tablet (10 mg total) by mouth daily. (Patient taking differently: Take 10 mg by mouth daily. 15mg daily) 90 tablet 3   ? aspirin 81 MG EC tablet Take 1 tablet (81 mg total) by mouth daily. 90 tablet 3   ? atorvastatin (LIPITOR) 40 MG tablet TAKE 1 TABLET DAILY (THIS REPLACES SIMVASTATIN) 90 tablet 3   ? cholecalciferol, vitamin D3, 400 unit Tab Take 400 Units by mouth daily.     ? cyanocobalamin (VITAMIN B-12) 500 MCG tablet Take 500 mcg by mouth daily.     ? ferrous gluconate 256 mg (28 mg iron) Tab Take 1 tablet by mouth daily.     ? furosemide (LASIX) 40 MG tablet Take 1 tablet (40 mg total) by mouth daily. 90 tablet 3   ? mirtazapine (REMERON) 30 MG tablet Take 1 tablet (30 mg total) by mouth at bedtime. 90 tablet 3   ? multivitamin therapeutic (THERAGRAN) tablet Take 1 tablet by mouth every evening.      ? omega-3 fatty acids-vitamin E (FISH OIL) 1,000 mg cap Take 1,000 mg by mouth daily.      ? rivaroxaban (XARELTO) 15 mg tablet Take 1 tablet (15 mg total) by mouth daily with supper. 90 tablet 3   ? sotaloL (BETAPACE) 80 MG tablet Take 1 tablet (80 mg total) by mouth 2 (two) times a day. 60 tablet 2   ? tamsulosin (FLOMAX) 0.4 mg cap Take 1 capsule (0.4 mg total) by mouth daily. 90 capsule 3   ? tiotropium (SPIRIVA WITH HANDIHALER) 18 mcg inhalation capsule Place 1 capsule (2 puffs total) into inhaler and inhale daily. Close and press  button to crush and then inhale in 2 breaths. (Patient taking differently: Place 18 mcg into inhaler and inhale as needed. Close and press button to crush and then inhale in 2 breaths.) 90 capsule 5   ? vitamin E acetate (VITAMIN E ORAL) Take 1 tablet by mouth daily.       No current facility-administered medications for this visit.     Allergies   Allergen Reactions   ? Ofloxacin Hives   ? Quinolones Itching         Lab Results    Chemistry/lipid CBC Cardiac Enzymes/BNP/TSH/INR   Lab Results   Component Value Date    CHOL 95 08/07/2019    HDL 33 (L) 08/07/2019    LDLCALC 46 08/07/2019    TRIG 81 08/07/2019    CREATININE 1.99 (H) 01/24/2020    BUN 28 01/24/2020    K 4.4 01/30/2020     01/24/2020     01/24/2020    CO2 32 (H) 01/24/2020    Lab Results   Component Value Date    WBC 7.0 12/05/2019    HGB 12.0 (L) 12/05/2019    HCT 37.1 (L) 12/05/2019    MCV 93 12/05/2019     12/05/2019    Lab Results   Component Value Date    CKTOTAL 120 08/07/2019    TROPONINI 0.04 12/02/2019    BNP 1,399 (H) 12/06/2019    TSH 1.40 12/02/2019    INR 0.90 06/27/2015        Fabian Perez

## 2021-06-29 NOTE — PROGRESS NOTES
"Progress Notes by Renee Gonzales CNP at 6/29/2020  2:50 PM     Author: Renee Gonzales CNP Service: -- Author Type: Nurse Practitioner    Filed: 6/29/2020  3:18 PM Encounter Date: 6/29/2020 Status: Signed    : Renee Gonzales CNP (Nurse Practitioner)           The patient has been notified of following:     \"This telephone visit will be conducted via a call between you and your physician/provider. We have found that certain health care needs can be provided without the need for a physical exam.  This service lets us provide the care you need with a phone conversation.  If a prescription is necessary we can send it directly to your pharmacy.  If lab work is needed we can place an order for that and you can then stop by our lab to have the test done at a later time. If during the course of the call the physician/provider feels a telephone visit is not appropriate, you will not be charged for this service.\" Verbal consent has been obtained for this service by care team member:         HEART CARE PHONE ENCOUNTER        The patient has chosen to have the visit conducted as a telephone visit, to reduce risk of exposure given the current status of Coronavirus in our community. This telephone visit is being conducted via a call between the patient and physician/provider. Health care needs are being provided without a physical exam.     Assessment/Recommendations   Assessment:    1. Persistent atrial fibrillation: Dx 12/2019. CV 1/30/20 with early recurrence. Spontaneous conversion on sotalol 80 mg two times a day. Needs EKG as he was previously bradycardic. Continue Xarelto for AC for [id number] score of 7.    2.  FOSTER: Dr. Perez did discuss ischemic work up with patient who prefers to attempt to be more active. Could also be related to sotalol or HF  3.  Easy bruising: He doesn't feel this is problematic, but we did discuss option of LAAC if he has any signifcant issues, he is not interested " at this time.   4.  Moderate AS: Followed by Dr. Perez  5.  CKD: BMP ordered  6.  HFpEF: BNP and BMP ordered in follow up to increase in lasix.       Follow Up Plan: Follow up in 6 months.   I have reviewed the note as documented.  This accurately captures the substance of my conversation with the patient.    Total time of call between patient and provider was 6 minutes   Start Time:1440   Stop Time:1446       History of Present Illness/Subjective    Richie Gordillo is a 79 y.o. male who is being evaluated via a billable telephone visit. Today is a routine 3 month follow up due to patients shortness of breath at last OVAngeles Quiroz has a past medical history significant for alcohol abuse in remission, hyperlipidemia, HTN, PVD, carotid artery stenosis, emphysema, HFpEF, and persistent atrial fibrillation. He was first diagnosed with Afib incidentally by PCP on 12/2/19, he sent him to the ER. He was placed on Xarelto for stroke prevention and metoprolol was increased, and he was started on diltiazem and digoxin for rate control. He underwent CV 1/30/20 due to shortness of breath and activity intolerance. He saw Kika HF NP 2/18/20 and was back in AF. He was then loaded on sotalol and scheduled for CV 2/26/20, however was in normal rhythm on presentation.     He continues to be aware of easy bruising and bleeding, although he doesn't feel its concerning enough to go through LAAC, which we did discuss. He is not active and does acknowledge that he would benefit from regular physical activity. Otherwise, no change in condition. He notes a stable weight since increase in lasix back in May with Dr. Perez.     I have reviewed and updated the patient's Past Medical History, Social History, Family History and Medication List.     Physical Examination not performed given phone encounter Review of Systems                                                Medical History  Surgical History Family History Social History    Past Medical History:   Diagnosis Date   ? Arthritis    ? Atrial fibrillation (H)    ? BPH (benign prostatic hyperplasia)    ? Central retinal artery occlusion, right eye    ? Chronic kidney disease     CKD   ? Essential tremor    ? History of alcohol abuse     in remission   ? HTN (hypertension)    ? Hyperlipidemia    ? Hyponatremia    ? Leg mass, left    ? Murmur    ? PVD (peripheral vascular disease) (H)    ? Renal Tubular Acidosis      secondary to bladder outlet obstruction; resolved     ? Sleep apnea    ? Stroke (H)     TIA    Past Surgical History:   Procedure Laterality Date   ? CARDIOVERSION  2020   ? CATARACT EXTRACTION     ? EXTERNAL EAR SURGERY      mastoid   ? EYE SURGERY     ? ILIAC ARTERY STENT Right     twice,  and    ? LEG SKIN LESION  BIOPSY / EXCISION Left 2017    Procedure: EXCISION LEFT POSTERIOR LEG MASS;  Surgeon: Joseluis Fernandez MD;  Location: [facility];  Service:    ? [place] TRANSURETHRAL ELEC-SURG PROSTATECTOM      Description: Transurethral Resection Of Prostate (TURP);  Recorded: 2009;    Family History   Problem Relation Age of Onset   ? Brain cancer Father          age 76   ? Pancreatic cancer Mother          age 52   ? Cancer Brother         metastatic, unknown primary   ? Heart disease Brother     Social History     Socioeconomic History   ? Marital status:      Spouse name: Not on file   ? Number of children: Not on file   ? Years of education: Not on file   ? Highest education level: Not on file   Occupational History   ? Not on file   Social Needs   ? Financial resource strain: Not on file   ? Food insecurity     Worry: Not on file     Inability: Not on file   ? Transportation needs     Medical: Not on file     Non-medical: Not on file   Tobacco Use   ? Smoking status: Former Smoker     Packs/day: 0.50     Types: Cigarettes     Start date: 1962     Last attempt to quit: 2019     Years since quittin.5   ? Smokeless  tobacco: Never Used   Substance and Sexual Activity   ? Alcohol use: No     Comment: hx alcohol abuse in remission   ? Drug use: No   ? Sexual activity: Yes     Partners: Female   Lifestyle   ? Physical activity     Days per week: Not on file     Minutes per session: Not on file   ? Stress: Not on file   Relationships   ? Social connections     Talks on phone: Not on file     Gets together: Not on file     Attends [employer] service: Not on file     Active member of club or organization: Not on file     Attends meetings of clubs or organizations: Not on file     Relationship status: Not on file   ? Intimate partner violence     Fear of current or ex partner: Not on file     Emotionally abused: Not on file     Physically abused: Not on file     Forced sexual activity: Not on file   Other Topics Concern   ? Not on file   Social History Narrative    He is a retired  and last worked at [employer]. He also was a dental technician. He is  and has 3 children and 8 grandchildren.          Medications  Allergies   Current Outpatient Medications   Medication Sig Dispense Refill   ? amLODIPine (NORVASC) 10 MG tablet Take 1 tablet (10 mg total) by mouth daily. 90 tablet 3   ? aspirin 81 MG EC tablet Take 1 tablet (81 mg total) by mouth daily. 90 tablet 3   ? atorvastatin (LIPITOR) 40 MG tablet TAKE 1 TABLET DAILY (THIS REPLACES SIMVASTATIN) 90 tablet 3   ? cholecalciferol, vitamin D3, 400 unit Tab Take 400 Units by mouth daily.     ? cyanocobalamin (VITAMIN B-12) 500 MCG tablet Take 500 mcg by mouth daily.     ? ferrous gluconate 256 mg (28 mg iron) Tab Take 1 tablet by mouth daily.     ? furosemide (LASIX) 40 MG tablet Take 1 tablet (40 mg total) by mouth daily. 90 tablet 3   ? mirtazapine (REMERON) 30 MG tablet TAKE 1 TABLET AT BEDTIME 90 tablet 3   ? multivitamin therapeutic (THERAGRAN) tablet Take 1 tablet by mouth every evening.      ? omega-3 fatty acids-vitamin E (FISH OIL) 1,000 mg cap Take 1,000  mg by mouth daily.      ? rivaroxaban (XARELTO) 15 mg tablet Take 1 tablet (15 mg total) by mouth daily with supper. 90 tablet 3   ? sotaloL (BETAPACE) 80 MG tablet Take 1 tablet (80 mg total) by mouth 2 (two) times a day. 180 tablet 1   ? tamsulosin (FLOMAX) 0.4 mg cap Take 1 capsule (0.4 mg total) by mouth daily. 90 capsule 3   ? tiotropium (SPIRIVA WITH HANDIHALER) 18 mcg inhalation capsule Place 1 capsule (2 puffs total) into inhaler and inhale daily. Close and press button to crush and then inhale in 2 breaths. (Patient taking differently: Place 18 mcg into inhaler and inhale as needed. Close and press button to crush and then inhale in 2 breaths.) 90 capsule 5   ? vitamin E acetate (VITAMIN E ORAL) Take 1 tablet by mouth daily.       No current facility-administered medications for this visit.     Allergies   Allergen Reactions   ? Ofloxacin Hives   ? Quinolones Itching         Lab Results    Chemistry/lipid CBC Cardiac Enzymes/BNP/TSH/INR   Lab Results   Component Value Date    CHOL 95 08/07/2019    HDL 33 (L) 08/07/2019    LDLCALC 46 08/07/2019    TRIG 81 08/07/2019    CREATININE 1.99 (H) 01/24/2020    BUN 28 01/24/2020    K 4.4 01/30/2020     01/24/2020     01/24/2020    CO2 32 (H) 01/24/2020    Lab Results   Component Value Date    WBC 7.0 12/05/2019    HGB 12.0 (L) 12/05/2019    HCT 37.1 (L) 12/05/2019    MCV 93 12/05/2019     12/05/2019    Lab Results   Component Value Date    CKTOTAL 120 08/07/2019    TROPONINI 0.04 12/02/2019    BNP 1,399 (H) 12/06/2019    TSH 1.40 12/02/2019    INR 0.90 06/27/2015        Darin Gonzales

## 2021-06-29 NOTE — PROGRESS NOTES
"Progress Notes by Kika Perez CNP at 7/14/2020  9:50 AM     Author: Kika Perez CNP Service: -- Author Type: Nurse Practitioner    Filed: 7/14/2020  9:58 AM Encounter Date: 7/14/2020 Status: Signed    : Kika Perez CNP (Nurse Practitioner)           The patient has been notified of following:     \"This telephone visit will be conducted via a call between you and your physician/provider. We have found that certain health care needs can be provided without the need for a physical exam.  This service lets us provide the care you need with a phone conversation.  If a prescription is necessary we can send it directly to your pharmacy.  If lab work is needed we can place an order for that and you can then stop by our lab to have the test done at a later time. If during the course of the call the physician/provider feels a telephone visit is not appropriate, you will not be charged for this service.\" Verbal consent has been obtained for this service by care team member:         HEART CARE PHONE ENCOUNTER        The patient has chosen to have the visit conducted as a telephone visit, to reduce risk of exposure given the current status of Coronavirus in our community. This telephone visit is being conducted via a call between the patient and physician/provider. Health care needs are being provided without a physical exam.     Assessment/Recommendations   Assessment:    1. Heart failure with preserved ejection fraction: He states he has mild fatigue and dyspnea on exertion. His symptoms have been stable. He denies orthopnea, PND or edema. His weight has been stable.    2.  Hypertension: Controlled. Blood pressure at home 138/66.    3. Persistent atrial fibrillation: He continues xarelto for anticoagulation. He continues sotalol.    Plan:  1. Continue current medications  2. Continue daily weights and low sodium diet  3. Encouraged exercise      Follow Up Plan: Follow up with Dr Perez in " November and in the heart failure clinic in September  I have reviewed the note as documented.  This accurately captures the substance of my conversation with the patient.    Total time of call between patient and provider was 9 minutes   Start Time: 0942  Stop Time: 0951       History of Present Illness/Subjective    Richie Gordillo is a 79 y.o. male who is being evaluated via a billable telephone visit.  He follows up for heart failure with preserved ejection fraction.  He had an echocardiogram December 3, 2019 which showed an ejection fraction of 55% and moderate aortic stenosis.  He has a past medical history significant for hypertension, peripheral vascular disease, persistent atrial fibrillation, dyslipidemia, COPD, chronic kidney disease, and tobacco use.  He quit smoking.     Today, he continues to have fatigue and dyspnea on exertion. He denies orthopnea, PND, edema, chest pain or abdominal distention.    His weight has been stable. His weight today was 144 pounds. He follows a low sodium diet. He does not exercise much.       I have reviewed and updated the patient's Past Medical History, Social History, Family History and Medication List.     Physical Examination not performed given phone encounter Review of Systems                                                Medical History  Surgical History Family History Social History   Past Medical History:   Diagnosis Date   ? Arthritis    ? Atrial fibrillation (H)    ? BPH (benign prostatic hyperplasia)    ? Central retinal artery occlusion, right eye    ? Chronic kidney disease     CKD   ? Essential tremor    ? History of alcohol abuse     in remission   ? HTN (hypertension)    ? Hyperlipidemia    ? Hyponatremia    ? Leg mass, left    ? Murmur    ? PVD (peripheral vascular disease) (H)    ? Renal Tubular Acidosis      secondary to bladder outlet obstruction; resolved     ? Sleep apnea    ? Stroke (H)     TIA    Past Surgical History:   Procedure Laterality Date    ? CARDIOVERSION  2020   ? CATARACT EXTRACTION     ? EXTERNAL EAR SURGERY      mastoid   ? EYE SURGERY     ? ILIAC ARTERY STENT Right     twice,  and    ? LEG SKIN LESION  BIOPSY / EXCISION Left 2017    Procedure: EXCISION LEFT POSTERIOR LEG MASS;  Surgeon: Joseluis Fernandez MD;  Location: [facility];  Service:    ? DE TRANSURETHRAL ELEC-SURG PROSTATECTOM      Description: Transurethral Resection Of Prostate (TURP);  Recorded: 2009;    Family History   Problem Relation Age of Onset   ? Brain cancer Father          age 76   ? Pancreatic cancer Mother          age 52   ? Cancer Brother         metastatic, unknown primary   ? Heart disease Brother     Social History     Socioeconomic History   ? Marital status:      Spouse name: Not on file   ? Number of children: Not on file   ? Years of education: Not on file   ? Highest education level: Not on file   Occupational History   ? Not on file   Social Needs   ? Financial resource strain: Not on file   ? Food insecurity     Worry: Not on file     Inability: Not on file   ? Transportation needs     Medical: Not on file     Non-medical: Not on file   Tobacco Use   ? Smoking status: Former Smoker     Packs/day: 0.50     Types: Cigarettes     Start date: 1962     Last attempt to quit: 2019     Years since quittin.6   ? Smokeless tobacco: Never Used   Substance and Sexual Activity   ? Alcohol use: No     Comment: hx alcohol abuse in remission   ? Drug use: No   ? Sexual activity: Yes     Partners: Female   Lifestyle   ? Physical activity     Days per week: Not on file     Minutes per session: Not on file   ? Stress: Not on file   Relationships   ? Social connections     Talks on phone: Not on file     Gets together: Not on file     Attends Caoda[employer] service: Not on file     Active member of club or organization: Not on file     Attends meetings of clubs or organizations: Not on file     Relationship status: Not on  file   ? Intimate partner violence     Fear of current or ex partner: Not on file     Emotionally abused: Not on file     Physically abused: Not on file     Forced sexual activity: Not on file   Other Topics Concern   ? Not on file   Social History Narrative    He is a retired  and last worked at [employer]. He also was a dental technician. He is  and has 3 children and 8 grandchildren.          Medications  Allergies   Current Outpatient Medications   Medication Sig Dispense Refill   ? amLODIPine (NORVASC) 10 MG tablet Take 1 tablet (10 mg total) by mouth daily. 90 tablet 3   ? aspirin 81 MG EC tablet Take 1 tablet (81 mg total) by mouth daily. 90 tablet 3   ? atorvastatin (LIPITOR) 40 MG tablet TAKE 1 TABLET DAILY (THIS REPLACES SIMVASTATIN) 90 tablet 3   ? cholecalciferol, vitamin D3, 400 unit Tab Take 400 Units by mouth daily.     ? cyanocobalamin (VITAMIN B-12) 500 MCG tablet Take 500 mcg by mouth daily.     ? ferrous gluconate 256 mg (28 mg iron) Tab Take 1 tablet by mouth daily.     ? furosemide (LASIX) 40 MG tablet Take 1 tablet (40 mg total) by mouth daily. 90 tablet 3   ? mirtazapine (REMERON) 30 MG tablet TAKE 1 TABLET AT BEDTIME 90 tablet 3   ? multivitamin therapeutic (THERAGRAN) tablet Take 1 tablet by mouth every evening.      ? omega-3 fatty acids-vitamin E (FISH OIL) 1,000 mg cap Take 1,000 mg by mouth daily.      ? rivaroxaban (XARELTO) 15 mg tablet Take 1 tablet (15 mg total) by mouth daily with supper. 90 tablet 3   ? sotaloL (BETAPACE) 80 MG tablet Take 1 tablet (80 mg total) by mouth 2 (two) times a day. 180 tablet 1   ? tamsulosin (FLOMAX) 0.4 mg cap Take 1 capsule (0.4 mg total) by mouth daily. 90 capsule 3   ? tiotropium (SPIRIVA WITH HANDIHALER) 18 mcg inhalation capsule Place 1 capsule (2 puffs total) into inhaler and inhale daily. Close and press button to crush and then inhale in 2 breaths. (Patient taking differently: Place 18 mcg into inhaler and inhale as  needed. Close and press button to crush and then inhale in 2 breaths.) 90 capsule 5   ? vitamin E acetate (VITAMIN E ORAL) Take 1 tablet by mouth daily.       No current facility-administered medications for this visit.     Allergies   Allergen Reactions   ? Ofloxacin Hives   ? Quinolones Itching         Lab Results    Chemistry/lipid CBC Cardiac Enzymes/BNP/TSH/INR   Lab Results   Component Value Date    CHOL 95 08/07/2019    HDL 33 (L) 08/07/2019    LDLCALC 46 08/07/2019    TRIG 81 08/07/2019    CREATININE 1.99 (H) 01/24/2020    BUN 28 01/24/2020    K 4.4 01/30/2020     01/24/2020     01/24/2020    CO2 32 (H) 01/24/2020    Lab Results   Component Value Date    WBC 7.5 07/09/2020    HGB 13.1 (L) 07/09/2020    HCT 40.2 07/09/2020    MCV 89 07/09/2020     07/09/2020    Lab Results   Component Value Date    CKTOTAL 120 08/07/2019    TROPONINI 0.04 12/02/2019     (H) 07/09/2020    TSH 1.40 12/02/2019    INR 0.90 06/27/2015        Kika Perez

## 2021-06-29 NOTE — PROGRESS NOTES
"Progress Notes by Renee Gonzales CNP at 4/27/2020  9:50 AM     Author: Renee Gonzales CNP Service: -- Author Type: Nurse Practitioner    Filed: 4/27/2020 10:30 AM Encounter Date: 4/27/2020 Status: Signed    : Renee Gonzales CNP (Nurse Practitioner)           The patient has been notified of following:     \"This telephone visit will be conducted via a call between you and your physician/provider. We have found that certain health care needs can be provided without the need for a physical exam.  This service lets us provide the care you need with a phone conversation.  If a prescription is necessary we can send it directly to your pharmacy.  If lab work is needed we can place an order for that and you can then stop by our lab to have the test done at a later time. If during the course of the call the physician/provider feels a telephone visit is not appropriate, you will not be charged for this service.\" Verbal consent has been obtained for this service by care team member:         HEART CARE PHONE ENCOUNTER        The patient has chosen to have the visit conducted as a telephone visit, to reduce risk of exposure given the current status of Coronavirus in our community. This telephone visit is being conducted via a call between the patient and physician/provider. Health care needs are being provided without a physical exam.     Assessment/Recommendations   Assessment:    1. Persistent atrial fibrillation  2.  FOSTER  3.  HTN  4.  Moderate AS  5.  CKD  6.  Emphysema  7.  Carotid artery stenosis  8.  HFpEF    Plan:  1. Renal function has varied in the past couple of months. Will get an EKG post COVID to check QTc. May need to consider adjustment to daily. Alternative therapies to consider are Multaq, however they are worried about cost as Xarelto is expensive. He is not a good candidate for amiodarone due to emphysema.   2.  Stress test due to risk factors for CAD. If normal, 14 day LAZARA " monitor. Could also be valvular  3.  Blood pressure varies pretty significantly. We discussed the best way to take him BP. He will continue amlodipine 5 mg daily, follow up with PCP.   4.  Upcoming appointment with Dr. Perez.    5.  Needs BMP at next OV  6.  Unclear if patient would be a good PVI candidate, to be addressed at 3 month follow up in clinic.   7.  PCP following. B 5.-69% stenosis.   8.  Follow up with Kika as scheduled.     Follow Up Plan: Follow up in 3 months with myself of [place]  I have reviewed the note as documented.  This accurately captures the substance of my conversation with the patient.       Start Time:0957   Stop Time:1015       History of Present Illness/Subjective    Richie Gordillo is a 79 y.o. male who is being evaluated via a billable telephone visit.  Today is a post CV follow up from 2/26/20.    Richie has a past medical history significant for alcohol abuse in remission, hyperlipidemia, HTN, PVD, carotid artery stenosis, emphysema, HFpEF, and persistent atrial fibrillation. He was first diagnosed with Afib incidentally by PCP on 12/2/19, he sent him to the ER. He was placed on Xarelto for stroke prevention and metoprolol was increased, and he was started on diltiazem and digoxin for rate control. He underwent CV 1/30/20 due to shortness of breath and activity intolerance. He saw Kika HF NP 2/18/20 and was back in AF. He was then loaded on sotalol and scheduled for CV 2/26/20, however was in normal rhythm on presentation. He still has some general fatigue. Feels he isnt able to lift what he had been able to. He is shortness of breath with some activities, no chest pain.     Echo 12/3/19    I have reviewed and updated the patient's Past Medical History, Social History, Family History and Medication List.     Physical Examination not performed given phone encounter Review of Systems                                                Medical History  Surgical History Family History  Social History   Past Medical History:   Diagnosis Date   ? Arthritis    ? Atrial fibrillation (H)    ? BPH (benign prostatic hyperplasia)    ? Central retinal artery occlusion, right eye    ? Chronic kidney disease     CKD   ? Essential tremor    ? History of alcohol abuse     in remission   ? HTN (hypertension)    ? Hyperlipidemia    ? Hyponatremia    ? Leg mass, left    ? Murmur    ? PVD (peripheral vascular disease) (H)    ? Renal Tubular Acidosis      secondary to bladder outlet obstruction; resolved     ? Sleep apnea    ? Stroke (H)     TIA    Past Surgical History:   Procedure Laterality Date   ? CARDIOVERSION  2020   ? CATARACT EXTRACTION     ? EXTERNAL EAR SURGERY      mastoid   ? EYE SURGERY     ? ILIAC ARTERY STENT Right     twice,  and    ? LEG SKIN LESION  BIOPSY / EXCISION Left 2017    Procedure: EXCISION LEFT POSTERIOR LEG MASS;  Surgeon: Joseluis Fernandez MD;  Location: [facility];  Service:    ? NE TRANSURETHRAL ELEC-SURG PROSTATECTOM      Description: Transurethral Resection Of Prostate (TURP);  Recorded: 2009;    Family History   Problem Relation Age of Onset   ? Brain cancer Father          age 76   ? Pancreatic cancer Mother          age 52   ? Cancer Brother         metastatic, unknown primary   ? Heart disease Brother     Social History     Socioeconomic History   ? Marital status:      Spouse name: Not on file   ? Number of children: Not on file   ? Years of education: Not on file   ? Highest education level: Not on file   Occupational History   ? Not on file   Social Needs   ? Financial resource strain: Not on file   ? Food insecurity     Worry: Not on file     Inability: Not on file   ? Transportation needs     Medical: Not on file     Non-medical: Not on file   Tobacco Use   ? Smoking status: Former Smoker     Packs/day: 0.50     Types: Cigarettes     Start date: 1962     Last attempt to quit: 2019     Years since quittin.4   ?  Smokeless tobacco: Never Used   Substance and Sexual Activity   ? Alcohol use: No     Comment: hx alcohol abuse in remission   ? Drug use: No   ? Sexual activity: Yes     Partners: Female   Lifestyle   ? Physical activity     Days per week: Not on file     Minutes per session: Not on file   ? Stress: Not on file   Relationships   ? Social connections     Talks on phone: Not on file     Gets together: Not on file     Attends Catholic service: Not on file     Active member of club or organization: Not on file     Attends meetings of clubs or organizations: Not on file     Relationship status: Not on file   ? Intimate partner violence     Fear of current or ex partner: Not on file     Emotionally abused: Not on file     Physically abused: Not on file     Forced sexual activity: Not on file   Other Topics Concern   ? Not on file   Social History Narrative    He is a retired  and last worked at [employer]. He also was a dental technician. He is  and has 3 children and 8 grandchildren.          Medications  Allergies   Current Outpatient Medications   Medication Sig Dispense Refill   ? amLODIPine (NORVASC) 5 MG tablet Take 1 tablet (5 mg total) by mouth daily. 30 tablet 6   ? aspirin 81 MG EC tablet Take 1 tablet (81 mg total) by mouth daily. 90 tablet 3   ? atorvastatin (LIPITOR) 40 MG tablet TAKE 1 TABLET DAILY (THIS REPLACES SIMVASTATIN) 90 tablet 3   ? cholecalciferol, vitamin D3, 400 unit Tab Take 400 Units by mouth daily.     ? cyanocobalamin (VITAMIN B-12) 500 MCG tablet Take 500 mcg by mouth daily.     ? ferrous gluconate 256 mg (28 mg iron) Tab Take 1 tablet by mouth daily.     ? furosemide (LASIX) 40 MG tablet Take 1 tablet (40 mg total) by mouth daily. 90 tablet 3   ? mirtazapine (REMERON) 30 MG tablet Take 1 tablet (30 mg total) by mouth at bedtime. 90 tablet 3   ? multivitamin therapeutic (THERAGRAN) tablet Take 1 tablet by mouth every evening.      ? omega-3 fatty acids-vitamin E  (FISH OIL) 1,000 mg cap Take 1,000 mg by mouth daily.      ? rivaroxaban (XARELTO) 15 mg tablet Take 1 tablet (15 mg total) by mouth daily with supper. 90 tablet 3   ? sotaloL (BETAPACE) 80 MG tablet Take 1 tablet (80 mg total) by mouth 2 (two) times a day. 60 tablet 2   ? tamsulosin (FLOMAX) 0.4 mg cap Take 1 capsule (0.4 mg total) by mouth daily. 90 capsule 3   ? tiotropium (SPIRIVA WITH HANDIHALER) 18 mcg inhalation capsule Place 1 capsule (2 puffs total) into inhaler and inhale daily. Close and press button to crush and then inhale in 2 breaths. (Patient taking differently: Place 18 mcg into inhaler and inhale as needed. Close and press button to crush and then inhale in 2 breaths.) 90 capsule 5   ? vitamin E acetate (VITAMIN E ORAL) Take 1 tablet by mouth daily.       No current facility-administered medications for this visit.     Allergies   Allergen Reactions   ? Ofloxacin Hives   ? Quinolones Itching         Lab Results    Chemistry/lipid CBC Cardiac Enzymes/BNP/TSH/INR   Lab Results   Component Value Date    CHOL 95 08/07/2019    HDL 33 (L) 08/07/2019    LDLCALC 46 08/07/2019    TRIG 81 08/07/2019    CREATININE 1.99 (H) 01/24/2020    BUN 28 01/24/2020    K 4.4 01/30/2020     01/24/2020     01/24/2020    CO2 32 (H) 01/24/2020    Lab Results   Component Value Date    WBC 7.0 12/05/2019    HGB 12.0 (L) 12/05/2019    HCT 37.1 (L) 12/05/2019    MCV 93 12/05/2019     12/05/2019    Lab Results   Component Value Date    CKTOTAL 120 08/07/2019    TROPONINI 0.04 12/02/2019    BNP 1,399 (H) 12/06/2019    TSH 1.40 12/02/2019    INR 0.90 06/27/2015        Darin Gonzales

## 2021-06-29 NOTE — PROGRESS NOTES
Progress Notes by Selene Dye RN at 7/9/2020 10:30 AM     Author: Selene Dye RN Service: -- Author Type: Registered Nurse    Filed: 7/9/2020  4:16 PM Encounter Date: 7/9/2020 Status: Signed    : Selene Dye RN (Registered Nurse)       Renee Gonzales, Selene Dooley RN               Looks good, no changes.

## 2021-06-30 NOTE — PROGRESS NOTES
Progress Notes by Dinah Nevarez CNP at 12/3/2020 11:40 AM     Author: Dinah Nevarez CNP Service: -- Author Type: Nurse Practitioner    Filed: 12/3/2020 12:16 PM Encounter Date: 12/3/2020 Status: Signed    : Dinah Nevarez CNP (Nurse Practitioner)       Clinic Note    Assessment:     Assessment and Plan:  1. Pain in joint, ankle and foot, left/Plantar fasciitis: Started yesterday out of the blue. Worsening pain with ambulation. No mechanism of injury. Skin is intact. Slight redness and swelling noted. WBC was normal. Hgb is stable. X-rays read by provider show a large heel spur, plantar fascitis. Discussed home supportive measures.  -Ice 15 minutes four times per day, rolling foot on bottle.  -Exercises as provided.  -Good supportive shoes while ambulating, even when in the house.  -Inserts OTC as needed.  - XR Foot Left 3 or More VWS; Future  - XR Ankle Left 3 or More VWS; Future  - HM1(CBC and Differential)  - Basic Metabolic Panel  -Follow up if pain continues.     2. Hypertension: Blood pressure today in office was 188/76; likely related to pain. The patient will start monitoring his blood pressure at home again. He will update Dr. Tom if running greater than 140/90mmhg. He continues on Norvasc, Furosemide, and Sotalol.     3. Tobacco dependence syndrome: The patient started smoking again in July 5-10 cigarettes per day. He was not interested in help quitting.        Patient Instructions   Rest, Ice (roll foot on a frozen water bottle 15 minutes four times per day), Elevate your foot.    Tylenol as needed for pain control.    Icy hot or biofreeze topically as needed for pain control.     Consider getting a good pair of supportive tennis shoes to wear, even while in the house.    Consider inserts.     Monitor your blood pressure at home. Follow up with Dr. Tom if running greater than 140/90mmhg.    Follow up if symptoms persist or worsen.     Patient Education      Treating Plantar Fasciitis  First, your healthcare provider tries to find out the cause of your problem. He or she can then suggest ways to ease pain. If your pain is due to poor foot mechanics, occasionally custom-made shoe inserts (orthoses) may help.    Ease symptoms    To relieve mild symptoms, try aspirin, ibuprofen, or other medicines as directed. Rubbing ice on the area may also help.    To lessen severe pain and swelling, your healthcare provider may give you pills or injections. In some cases, you may need a walking cast. Physical therapy, such as ultrasound or a daily stretching program, may also be recommended. Surgery is rarely needed.    To ease symptoms caused by poor foot mechanics, your foot may be taped. This supports the arch and temporarily controls movement. Night splints may also help by stretching the fascia.    Control movement  If taping helps, your healthcare provider may prescribe orthoses. These are inserts built from plaster casts of your feet. They control the way your foot moves. As a result, your symptoms may go away.  Reduce overuse  Every time your foot strikes the ground, the plantar fascia is stretched. You can lessen the strain on the plantar fascia and the chance of overuse by:    Losing any excess weight    Not running on hard or uneven ground    Using orthoses, if recommended, in your shoes and house slippers  If surgery is needed  Your healthcare provider may consider surgery if other types of treatment don't control your pain. During surgery, the plantar fascia is partially cut to release tension. As you heal, fibrous tissue fills the space between the heel bone and the plantar fascia.  Date Last Reviewed: 1/1/2018 2000-2019 [employer]. [address] [place] [address], [place] [address]. All rights reserved. This information is not intended as a substitute for professional medical care. Always follow your healthcare professional's instructions.           Patient  Education     Understanding Plantar Fasciitis    Plantar fasciitis is a condition that causes foot and heel pain. The plantar fascia is a tough band of tissue that runs across the bottom of the foot from the heel to the toes. This tissue pulls on the heel bone. It supports the arch of the foot as it pushes off the ground. If the tissue becomes irritated or red and swollen (inflamed), it is called plantar fasciitis.   How to say it  PLAN-[facility]   What causes plantar fasciitis?  Plantar fasciitis most often occurs from overusing the plantar fascia. The tissue may become damaged from activities that put repeated stress on the heel and foot. Or it may wear down over time with age and ankle stiffness. You are more likely to have plantar fasciitis if you:    Do activities that require a lot of running, jumping, or dancing    Have new or increase activity    Have a job that requires being on your feet for long periods    Are overweight or obese    Have certain foot problems, such as a tight Achilles tendon, flat feet, or high arches    Often wear poorly fitting shoes  Symptoms of plantar fasciitis  The condition most often causes pain in the heel and the bottom of the foot. The pain may occur when you take your first steps in the morning. It may get better as you walk throughout the day. But as you continue to put weight on the foot, the pain often returns. Pain may also occur after standing or sitting for long periods.  Treating plantar fasciitis  Treatments for plantar fasciitis include:    Resting the foot. This involves limiting movements that make your foot hurt. You may also need to avoid certain sports and types of work for a time.    Using cold packs. Put an ice pack (wrapped in a thin towel) on the heel and foot to help reduce pain and swelling.    Taking medicines. Prescription and over-the-counter medicines can help relieve pain and swelling. NSAIDs (nonsteroidal anti-inflammatory drugs) are the  most common medicines used. They may be given as pills. Or they may be put on the skin as a gel, cream, or patch.    Using heel cups or foot inserts (orthotics). These are placed in the shoes to help support the heel or arch and cushion the heel. You may also be advised to buy proper-fitting shoes with good arch support and cushioned soles.    Taping the foot. This supports the arch and limits the movement of the plantar fascia to help ease symptoms.    Wearing a night splint. This stretches the plantar fascia and leg muscles while you sleep. This may help ease pain.    Doing exercises and physical therapy. These stretch and strengthen the plantar fascia and the muscles in the leg that support the heel and foot. Stretching your calf and plantar fascia is the most effective way to relieve pain.    Getting shots of medicine into the foot. These may help ease symptoms for a time. The shots often contain corticosteroids. These are strong anti-inflammatory medicines.    Having surgery. This may be needed if other treatments fail to relieve symptoms. During surgery, the surgeon may partially cut the plantar fascia to release tension.  Possible complications of plantar fasciitis  Without proper care and treatment, healing may take longer than normal. Also, symptoms may continue or get worse. Over time, the plantar fascia may be damaged. This can make it hard to walk or even stand without pain.  When to call your healthcare provider  Call your healthcare provider right away if you have any of these:    Fever of 100.4 F (38 C) or higher, or as directed by your provider    Chills    Symptoms that dont get better with treatment, or get worse    New symptoms, such as numbness, tingling, or weakness in the foot  Date Last Reviewed: 3/10/2016    7933-2868 [employer]. [address] [place] [address], [place] [address]. All rights reserved. This information is not intended as a substitute for professional medical care. Always  follow your healthcare professional's instructions.             Return if symptoms worsen or fail to improve.         Subjective:      Richie Gordillo is a 80 y.o. male presents today with concerns of left foot and ankle pain and swelling.    He reports that this started yesterday. He woke up, got out of bed, and had a lot of pain in his left lateral foot and ankle. He reports no mechanism of injury, this started out of the blue. He reports weight bearing is the most painful, sharp pain that is constant.    He denies taking anything for pain control.    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    He denies eating a lot of red meat, is not an alcohol drinker.    He reports that he started smoking again, 5-10 cigarettes per day.    He denies a fever, chills, shortness of breath, chest pain, chest pressure, nausea, vomiting, or any other symptoms today.     The following portions of the patient's history were reviewed and updated as appropriate.    Review of Systems:    Review is otherwise negative except for what is mentioned above.     Social Hx:    Social History     Tobacco Use   Smoking Status Current Every Day Smoker   ? Packs/day: 0.50   ? Types: Cigarettes   ? Start date: 1962   ? Last attempt to quit: 2019   ? Years since quittin.0   Smokeless Tobacco Never Used   Tobacco Comment    Quit 19 and restarted 20         Objective:     Vitals:    20 1138   BP: (!) 188/76   Pulse: (!) 58   Weight: 138 lb 1.6 oz (62.6 kg)       Physical Exam   Constitutional: He is oriented to person, place, and time and well-developed, well-nourished, and in no distress. No distress.   HENT:   Head: Normocephalic and atraumatic.   Musculoskeletal:      Left ankle: He exhibits swelling. He exhibits normal range of motion, no ecchymosis, no deformity, no laceration and normal pulse. Tenderness. Lateral malleolus tenderness found. No  medial malleolus, no AITFL, no CF ligament, no posterior TFL, no head of 5th metatarsal and no proximal fibula tenderness found. Achilles tendon normal.      Left foot: Normal range of motion and normal capillary refill. Tenderness and swelling present. No bony tenderness, crepitus, deformity or laceration.        Feet:    Neurological: He is alert and oriented to person, place, and time. Gait normal. Coordination normal.   Skin: Skin is warm and dry. He is not diaphoretic. There is erythema.   Psychiatric: Mood, memory, affect and judgment normal.   Nursing note and vitals reviewed.        Patient Active Problem List   Diagnosis   ? Alcohol Abuse - In Remission   ? Chronic kidney disease, stage III (moderate) (H)   ? Other hyperlipidemia   ? Hypertension   ? Peripheral vascular disease (H)   ? Homocysteinemia   ? Obstructive Sleep Apnea   ? Familial (Benign Essential) Tremor   ? Benign Prostatic Hypertrophy   ? Benign Polyps Of The Large Intestine   ? 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   ? Hyponatremia   ? Depression   ? Pulmonary emphysema, unspecified emphysema type (H)   ? History of colonic polyps   ? History of stroke   ? Encounter for therapeutic drug monitoring   ? Carotid artery stenosis, asymptomatic, bilateral   ? Aortic stenosis, moderate   ? Persistent atrial fibrillation (H)   ? Bilateral carotid artery stenosis   ? Chronic heart failure with preserved ejection fraction (H)   ? Easy bruising     Current Outpatient Medications   Medication Sig Dispense Refill   ? amLODIPine (NORVASC) 10 MG tablet Take 1 tablet (10 mg total) by mouth daily. 90 tablet 3   ? aspirin 81 MG EC tablet Take 1 tablet (81 mg total) by mouth daily. 90 tablet 3   ? atorvastatin (LIPITOR) 40 MG tablet TAKE 1 TABLET DAILY (THIS REPLACES SIMVASTATIN) 90 tablet 3   ? cholecalciferol, vitamin D3, 400 unit Tab Take 400 Units by mouth daily.     ? cyanocobalamin (VITAMIN B-12) 500 MCG tablet Take 500 mcg by mouth daily.     ?  ferrous gluconate 256 mg (28 mg iron) Tab Take 1 tablet by mouth daily.     ? furosemide (LASIX) 40 MG tablet Take 1 tablet (40 mg total) by mouth daily. 90 tablet 3   ? mirtazapine (REMERON) 30 MG tablet TAKE 1 TABLET AT BEDTIME 90 tablet 3   ? multivitamin therapeutic (THERAGRAN) tablet Take 1 tablet by mouth every evening.      ? omega-3 fatty acids-vitamin E (FISH OIL) 1,000 mg cap Take 1,000 mg by mouth daily.      ? rivaroxaban (XARELTO) 15 mg tablet Take 1 tablet (15 mg total) by mouth daily with supper. 90 tablet 3   ? sotaloL (BETAPACE) 80 MG tablet Take 1 tablet (80 mg total) by mouth 2 (two) times a day. 180 tablet 1   ? tamsulosin (FLOMAX) 0.4 mg cap Take 1 capsule (0.4 mg total) by mouth daily. 90 capsule 3   ? tiotropium (SPIRIVA WITH HANDIHALER) 18 mcg inhalation capsule Place 1 capsule (2 puffs total) into inhaler and inhale daily. Close and press button to crush and then inhale in 2 breaths. (Patient taking differently: Place 18 mcg into inhaler and inhale as needed. Close and press button to crush and then inhale in 2 breaths.) 90 capsule 5   ? vitamin E acetate (VITAMIN E ORAL) Take 1 tablet by mouth daily.       No current facility-administered medications for this visit.        Dinah Nevarez, Adult-Geriatric Nurse Practitioner  [facility] - Internal Medicine Team     12/3/2020

## 2021-06-30 NOTE — PROGRESS NOTES
Progress Notes by Kika Perez CNP at 12/29/2020  3:30 PM     Author: Kika Perez CNP Service: -- Author Type: Nurse Practitioner    Filed: 12/29/2020  4:17 PM Encounter Date: 12/29/2020 Status: Signed    : Kika Perez CNP (Nurse Practitioner)             Assessment/Recommendations   Assessment:    1.  Heart failure with preserved ejection fraction, NYHA class II: Compensated.  He has mild fatigue and dyspnea on exertion.  He denies orthopnea, PND or edema.  He has been losing weight.  He states he only eats approximately 1 meal per day.  His weight today was 130 pounds at home.  He follows a low-sodium diet.    2.  Hypertension: Uncontrolled.  Blood pressure 174/70 with a recheck of 160/60.  He states he has not been monitoring his blood pressure at home.  He was in his PMDs office about a month ago and his blood pressure was elevated at that time with 188/80    3.  Atrial fibrillation: EKG shows sinus bradycardia first-degree AV block and PACs.  QT/QTc 456/451 ms. he continues sotalol and Xarelto.    Plan:  1.  Start lisinopril 5 mg daily for blood pressure  2.  Encouraged him to start monitoring his blood pressure at home and bring readings when he sees Dr. Perez  3.  Future BMP ordered  4.  Continue daily weights and low-sodium diet  5.  Encouraged drinking Ensure or boost for weight gain  6.  Discussed smoking cessation    Richie Gordillo will follow up with Dr. Perez January 7 and in the heart failure clinic in 2 months.     History of Present Illness/Subjective    Mr. Richie Gordillo is a 80 y.o. male seen at [facility] heart failure clinic today for continued follow-up.  He follows up for heart failure with preserved ejection fraction.  He had an echocardiogram December 3, 2019 which showed an ejection fraction of 55% and moderate aortic stenosis.  He has a past medical history significant for hypertension, peripheral vascular disease, persistent atrial  fibrillation, dyslipidemia, COPD, chronic kidney disease, and tobacco use.  He states he has started smoking again.  He smokes about 10 cigarettes or less per day    He states he is doing well today.  He has mild fatigue and dyspnea on exertion.  He denies orthopnea, PND, chest pain, edema.  He denies lightheadedness, shortness of breath, orthopnea, PND, palpitations, chest pain, abdominal fullness/bloating and lower extremity edema.      He is monitoring home weights which have been decreasing. His weight today at home was 130 pounds.  He is following a low sodium diet.        Physical Examination Review of Systems   Vitals:    12/29/20 1558   BP: 160/60   Pulse:    Resp:      Body mass index is 20.53 kg/m .  Wt Readings from Last 3 Encounters:   12/29/20 135 lb (61.2 kg)   12/03/20 138 lb 1.6 oz (62.6 kg)   08/07/20 143 lb 9.6 oz (65.1 kg)       General Appearance:   no acute distress   ENT/Mouth: membranes moist, no oral lesions or bleeding gums.      EYES:  no scleral icterus, normal conjunctivae   Neck: no carotid bruits or thyromegaly   Chest/Lungs:   lungs are clear to auscultation, no rales or wheezing, equal chest wall expansion    Cardiovascular:   Regular. Normal first and second heart sounds with grade 2/6 systolic murmur, no rubs, or gallops; Jugular venous pressure normal, no edema bilaterally    Abdomen:  no organomegaly, masses, bruits, or tenderness; bowel sounds are present   Extremities: no cyanosis or clubbing   Skin: no xanthelasma, warm.    Neurologic: normal  bilateral, no tremors     Psychiatric: alert and oriented x3    General: Weight Loss     Ears/Nose/Throat: WNL  Lungs: WNL  Heart: WNL  Stomach: Constipation  Bladder: WNL  Muscle/Joints: Muscle Weakness  Skin: WNL  Nervous System: WNL  Mental Health: WNL     Blood: Easy Bruising, Easy Bleeding     Medical History  Surgical History Family History Social History   Past Medical History:   Diagnosis Date   ? Arthritis    ? Atrial  fibrillation (H)    ? BPH (benign prostatic hyperplasia)    ? Central retinal artery occlusion, right eye    ? Chronic kidney disease     CKD   ? Essential tremor    ? History of alcohol abuse     in remission   ? HTN (hypertension)    ? Hyperlipidemia    ? Hyponatremia    ? Leg mass, left    ? Murmur    ? PVD (peripheral vascular disease) (H)    ? Renal Tubular Acidosis      secondary to bladder outlet obstruction; resolved     ? Sleep apnea    ? Stroke (H)     TIA    Past Surgical History:   Procedure Laterality Date   ? CARDIOVERSION  2020   ? CATARACT EXTRACTION     ? EXTERNAL EAR SURGERY      mastoid   ? EYE SURGERY     ? ILIAC ARTERY STENT Right     twice,  and    ? LEG SKIN LESION  BIOPSY / EXCISION Left 2017    Procedure: EXCISION LEFT POSTERIOR LEG MASS;  Surgeon: Joseluis Fernandez MD;  [facility];  Service:    ? MO TRANSURETHRAL ELEC-SURG PROSTATECTOM      Description: Transurethral Resection Of Prostate (TURP);  Recorded: 2009;    Family History   Problem Relation Age of Onset   ? Brain cancer Father          age 76   ? Pancreatic cancer Mother          age 52   ? Cancer Brother         metastatic, unknown primary   ? Heart disease Brother     Social History     Socioeconomic History   ? Marital status:      Spouse name: Not on file   ? Number of children: Not on file   ? Years of education: Not on file   ? Highest education level: Not on file   Occupational History   ? Not on file   Social Needs   ? Financial resource strain: Not on file   ? Food insecurity     Worry: Not on file     Inability: Not on file   ? Transportation needs     Medical: Not on file     Non-medical: Not on file   Tobacco Use   ? Smoking status: Current Every Day Smoker     Packs/day: 0.50     Types: Cigarettes     Start date: 1962     Last attempt to quit: 2019     Years since quittin.0   ? Smokeless tobacco: Never Used   ? Tobacco comment: Quit 19 and  restarted 7/22/20   Substance and Sexual Activity   ? Alcohol use: No     Comment: hx alcohol abuse in remission   ? Drug use: No   ? Sexual activity: Yes     Partners: Female   Lifestyle   ? Physical activity     Days per week: Not on file     Minutes per session: Not on file   ? Stress: Not on file   Relationships   ? Social connections     Talks on phone: Not on file     Gets together: Not on file     Attends Oriental orthodox service: Not on file     Active member of club or organization: Not on file     Attends meetings of clubs or organizations: Not on file     Relationship status: Not on file   ? Intimate partner violence     Fear of current or ex partner: Not on file     Emotionally abused: Not on file     Physically abused: Not on file     Forced sexual activity: Not on file   Other Topics Concern   ? Not on file   Social History Narrative    He is a retired  and last worked at [employer]. He also was a dental technician. He is  and has 3 children and 8 grandchildren.          Medications  Allergies   Current Outpatient Medications   Medication Sig Dispense Refill   ? amLODIPine (NORVASC) 10 MG tablet Take 1 tablet (10 mg total) by mouth daily. 90 tablet 3   ? aspirin 81 MG EC tablet Take 1 tablet (81 mg total) by mouth daily. 90 tablet 3   ? atorvastatin (LIPITOR) 40 MG tablet TAKE 1 TABLET DAILY (THIS REPLACES SIMVASTATIN) 90 tablet 3   ? cholecalciferol, vitamin D3, 400 unit Tab Take 400 Units by mouth daily.     ? cyanocobalamin (VITAMIN B-12) 500 MCG tablet Take 500 mcg by mouth daily.     ? ferrous gluconate 256 mg (28 mg iron) Tab Take 1 tablet by mouth daily.     ? furosemide (LASIX) 40 MG tablet Take 1 tablet (40 mg total) by mouth daily. 90 tablet 3   ? mirtazapine (REMERON) 30 MG tablet TAKE 1 TABLET AT BEDTIME 90 tablet 3   ? multivitamin therapeutic (THERAGRAN) tablet Take 1 tablet by mouth every evening.      ? omega-3 fatty acids-vitamin E (FISH OIL) 1,000 mg cap Take 1,000  mg by mouth daily.      ? sotaloL (BETAPACE) 80 MG tablet Take 1 tablet (80 mg total) by mouth 2 (two) times a day. 180 tablet 1   ? tamsulosin (FLOMAX) 0.4 mg cap Take 1 capsule (0.4 mg total) by mouth daily. 90 capsule 3   ? vitamin E acetate (VITAMIN E ORAL) Take 1 tablet by mouth daily.     ? XARELTO 15 mg tablet TAKE 1 TABLET DAILY WITH SUPPER 90 tablet 0   ? lisinopriL (PRINIVIL,ZESTRIL) 5 MG tablet Take 1 tablet (5 mg total) by mouth daily. 90 tablet 0   ? tiotropium (SPIRIVA WITH HANDIHALER) 18 mcg inhalation capsule Place 1 capsule (2 puffs total) into inhaler and inhale daily. Close and press button to crush and then inhale in 2 breaths. 90 capsule 1     No current facility-administered medications for this visit.     Allergies   Allergen Reactions   ? Ofloxacin Hives   ? Quinolones Itching         Lab Results    Chemistry/lipid CBC Cardiac Enzymes/BNP/TSH/INR   Lab Results   Component Value Date    CHOL 95 08/07/2019    HDL 33 (L) 08/07/2019    LDLCALC 46 08/07/2019    TRIG 81 08/07/2019    CREATININE 1.64 (H) 12/03/2020    BUN 26 12/03/2020    K 4.2 12/03/2020     12/03/2020     12/03/2020    CO2 28 12/03/2020    Lab Results   Component Value Date    WBC 10.3 12/03/2020    HGB 13.5 (L) 12/03/2020    HCT 41.3 12/03/2020    MCV 91 12/03/2020     12/03/2020    Lab Results   Component Value Date    CKTOTAL 120 08/07/2019    TROPONINI 0.04 12/02/2019     (H) 07/09/2020    TSH 1.40 12/02/2019    INR 0.90 06/27/2015             This note has been dictated using voice recognition software. Any grammatical, typographical, or context distortions are unintentional and inherent to the software

## 2021-06-30 NOTE — PROGRESS NOTES
Progress Notes by Fabian Perez DO at 1/7/2021 11:30 AM     Author: Fabian Perez DO Service: -- Author Type: Physician    Filed: 1/7/2021  6:11 PM Encounter Date: 1/7/2021 Status: Signed    : Fabian Perez DO (Physician)         [facility] ENCOUNTER          Assessment/Recommendations   Assessment:    1. Atrial fib/flutter . CHADSVASC: 7 (Age-2, HTN, PVD, CVA-2, CHF).  On Xarelto   2. Chronic congestive heart failure with preserved ejection fraction (LVEF:55%,NYHA late III) and edema.   3. Moderate aortic stenosis (LILIA:1.3 cm2, mean gradient> 23, peak fern:2.9 m/sec, echo 2019 reviewed)  4. Hypertension, mildly elevated.    5. Prior CVA  6.  Moderate bilateral carotid artery disease   7. COPD   8. PVD   9. CKD stage III (CR:1.4-1.5)  10. Mild LVH      Plan:  1. Continue sotalol at 80 mg twice daily for rhythm control of A. Fib.  12 lead ECG recently demonstrated QTc<500 ms.   Stable renal function. Given CrCl would no increase dose.    2.  Xarelto for stroke   3.  Contin lasix to 40 mg daily PO for congestive heart failure (HFpEF).   4. Continue ASA for history of CVA and carotid artery disease  5.  Continue atorvastatin for carotid artery disease. LDL goal <70. At goal.   Lab Results   Component Value Date    LDLCALC 46 08/07/2019   6.  Given significant dyspnea on exertion.  Recommend ruling out ischemia.  Recommend nuclear stress testing, Lexiscan given patient is unable to exercise.          History of Present Illness   Richie Gordillo is a 80 y.o. male moderate aortic stenosis, congestive heart failure, paroxysmal A. Fib, hypertension, COPD who presents in cardiology clinic in follow-up.    Patient continues to have significant dyspnea on exertion.  Overall his lower extremity edema has improved with diuretic therapy.  He notes ongoing intermittent chest pain symptoms but significant dyspnea with exertion.  He has not had an ischemic evaluation recommended stress  test.  It was recommended by one of our SANDY that he had a stress test last fall.  This was not performed.  The most recent outpatient notes by our electrophysiology team.    Denies any ongoing orthopnea or PND symptoms.  No recent syncope.  No recent palpitations.    ECHO:   1. Normal left ventricular size and systolic performance with a visually estimated ejection fraction of 55%.   2. There is mild concentric increase in left ventricular wall thickness.   3. There is mild to moderate aortic stenosis.   4. Normal right ventricular size and systolic performance.   5. The left atrium is of normal size.   6. Color Doppler interrogation of the atrial septum reveals the presence of a patent foramen ovale (PFO).     When compared to the prior real-time echocardiogram dated 13 August 2019, the mean gradient obtained across the aortic valve on the current examination is slightly to the previous.    I have reviewed and updated the patient's Past Medical History, Social History, Family History and Medication List.     Physical Examination Review of Systems   Physical Exam   Constitutional: He is oriented to person, place, and time. He appears well-developed and well-nourished.   HENT:   Head: Normocephalic.   Eyes: Pupils are equal, round, and reactive to light.   Neck: Neck supple. No JVD present. No tracheal deviation present. No thyromegaly present.   Cardiovascular: Normal rate. Exam reveals no friction rub.   No murmur heard.  Pulmonary/Chest: He has no wheezes. He has no rales.   Abdominal: He exhibits no distension. There is no abdominal tenderness.   Musculoskeletal:         General: No deformity or edema.   Neurological: He is alert and oriented to person, place, and time. No cranial nerve deficit.   Skin: Skin is warm and dry.      General: Weight Gain  Eyes: WNL  Ears/Nose/Throat: WNL  Lungs: Shortness of Breath  Heart: Shortness of Breath with activity  Stomach: WNL  Bladder: Frequent Urination at  Night  Muscle/Joints: Muscle Weakness  Skin: WNL  Nervous System: Loss of Balance  Mental Health: WNL     Blood: WNL         Medical History  Surgical History Family History Social History   Past Medical History:   Diagnosis Date   ? Arthritis    ? Atrial fibrillation (H)    ? BPH (benign prostatic hyperplasia)    ? Central retinal artery occlusion, right eye    ? Chronic kidney disease     CKD   ? Essential tremor    ? History of alcohol abuse     in remission   ? HTN (hypertension)    ? Hyperlipidemia    ? Hyponatremia    ? Leg mass, left    ? Murmur    ? PVD (peripheral vascular disease) (H)    ? Renal Tubular Acidosis      secondary to bladder outlet obstruction; resolved     ? Sleep apnea    ? Stroke (H)     TIA    Past Surgical History:   Procedure Laterality Date   ? CARDIOVERSION  2020   ? CATARACT EXTRACTION     ? EXTERNAL EAR SURGERY      mastoid   ? EYE SURGERY     ? ILIAC ARTERY STENT Right     twice,  and    ? LEG SKIN LESION  BIOPSY / EXCISION Left 2017    Procedure: EXCISION LEFT POSTERIOR LEG MASS;  Surgeon: Joseluis Fernandez MD;  Location: [facility];  Service:    ? OH TRANSURETHRAL ELEC-SURG PROSTATECTOM      Description: Transurethral Resection Of Prostate (TURP);  Recorded: 2009;    Family History   Problem Relation Age of Onset   ? Brain cancer Father          age 76   ? Pancreatic cancer Mother          age 52   ? Cancer Brother         metastatic, unknown primary   ? Heart disease Brother     Social History     Socioeconomic History   ? Marital status:      Spouse name: Not on file   ? Number of children: Not on file   ? Years of education: Not on file   ? Highest education level: Not on file   Occupational History   ? Not on file   Social Needs   ? Financial resource strain: Not on file   ? Food insecurity     Worry: Not on file     Inability: Not on file   ? Transportation needs     Medical: Not on file     Non-medical: Not on file   Tobacco  Use   ? Smoking status: Current Every Day Smoker     Packs/day: 0.50     Types: Cigarettes     Start date: 1962     Last attempt to quit: 2019     Years since quittin.1   ? Smokeless tobacco: Never Used   ? Tobacco comment: Quit 19 and restarted 20   Substance and Sexual Activity   ? Alcohol use: No     Comment: hx alcohol abuse in remission   ? Drug use: No   ? Sexual activity: Yes     Partners: Female   Lifestyle   ? Physical activity     Days per week: Not on file     Minutes per session: Not on file   ? Stress: Not on file   Relationships   ? Social connections     Talks on phone: Not on file     Gets together: Not on file     Attends [employer] service: Not on file     Active member of club or organization: Not on file     Attends meetings of clubs or organizations: Not on file     Relationship status: Not on file   ? Intimate partner violence     Fear of current or ex partner: Not on file     Emotionally abused: Not on file     Physically abused: Not on file     Forced sexual activity: Not on file   Other Topics Concern   ? Not on file   Social History Narrative    He is a retired  and last worked at [employer]. He also was a dental technician. He is  and has 3 children and 8 grandchildren.          Medications  Allergies   Current Outpatient Medications   Medication Sig Dispense Refill   ? amLODIPine (NORVASC) 10 MG tablet Take 1 tablet (10 mg total) by mouth daily. 90 tablet 3   ? aspirin 81 MG EC tablet Take 1 tablet (81 mg total) by mouth daily. 90 tablet 3   ? atorvastatin (LIPITOR) 40 MG tablet TAKE 1 TABLET DAILY (THIS REPLACES SIMVASTATIN) 90 tablet 3   ? cholecalciferol, vitamin D3, 400 unit Tab Take 400 Units by mouth daily.     ? cyanocobalamin (VITAMIN B-12) 500 MCG tablet Take 500 mcg by mouth daily.     ? ferrous gluconate 256 mg (28 mg iron) Tab Take 1 tablet by mouth daily.     ? furosemide (LASIX) 40 MG tablet Take 1 tablet (40 mg total) by mouth  daily. 90 tablet 3   ? lisinopriL (PRINIVIL,ZESTRIL) 5 MG tablet Take 1 tablet (5 mg total) by mouth daily. 90 tablet 3   ? mirtazapine (REMERON) 30 MG tablet TAKE 1 TABLET AT BEDTIME 90 tablet 3   ? multivitamin therapeutic (THERAGRAN) tablet Take 1 tablet by mouth every evening.      ? omega-3 fatty acids-vitamin E (FISH OIL) 1,000 mg cap Take 1,000 mg by mouth daily.      ? sotaloL (BETAPACE) 80 MG tablet Take 1 tablet (80 mg total) by mouth 2 (two) times a day. 180 tablet 3   ? tamsulosin (FLOMAX) 0.4 mg cap Take 1 capsule (0.4 mg total) by mouth daily. 90 capsule 3   ? tiotropium (SPIRIVA WITH HANDIHALER) 18 mcg inhalation capsule Place 1 capsule (2 puffs total) into inhaler and inhale daily. Close and press button to crush and then inhale in 2 breaths. 90 capsule 3   ? vitamin E acetate (VITAMIN E ORAL) Take 1 tablet by mouth daily.     ? XARELTO 15 mg tablet TAKE 1 TABLET DAILY WITH SUPPER 90 tablet 0     No current facility-administered medications for this visit.     Allergies   Allergen Reactions   ? Ofloxacin Hives   ? Quinolones Itching         Lab Results    Chemistry/lipid CBC Cardiac Enzymes/BNP/TSH/INR   Lab Results   Component Value Date    CHOL 95 08/07/2019    HDL 33 (L) 08/07/2019    LDLCALC 46 08/07/2019    TRIG 81 08/07/2019    CREATININE 1.64 (H) 12/03/2020    BUN 26 12/03/2020    K 4.2 12/03/2020     12/03/2020     12/03/2020    CO2 28 12/03/2020    Lab Results   Component Value Date    WBC 10.3 12/03/2020    HGB 13.5 (L) 12/03/2020    HCT 41.3 12/03/2020    MCV 91 12/03/2020     12/03/2020    Lab Results   Component Value Date    CKTOTAL 120 08/07/2019    TROPONINI 0.04 12/02/2019     (H) 07/09/2020    TSH 1.40 12/02/2019    INR 0.90 06/27/2015        Fabian Perez

## 2021-07-03 NOTE — ADDENDUM NOTE
Addendum Note by Renee Gonzales CNP at 2/18/2020  9:50 AM     Author: Renee Gonzales CNP Service: -- Author Type: Nurse Practitioner    Filed: 2/18/2020  2:06 PM Encounter Date: 2/18/2020 Status: Signed    : Renee Gonzales CNP (Nurse Practitioner)    Addended by: RENEE GONZALES on: 2/18/2020 02:06 PM        Modules accepted: Orders, SmartSet

## 2021-07-04 NOTE — TELEPHONE ENCOUNTER
Telephone Encounter by Cinthia Ayala at 6/15/2021 10:43 AM     Author: Cinthia Ayala Service: -- Author Type: --    Filed: 6/15/2021 10:44 AM Encounter Date: 6/9/2021 Status: Signed    : Cinthia Ayala APPROVED:    Approval start date: 03/13/2021  Approval end date:  06/14/2022    Pharmacy has been notified of approval and will contact patient when medication is ready for pickup.

## 2021-07-15 PROBLEM — J96.21 ACUTE AND CHRONIC RESPIRATORY FAILURE WITH HYPOXIA (H): Status: ACTIVE | Noted: 2021-01-01

## 2021-07-15 PROBLEM — J44.1 COPD WITH ACUTE EXACERBATION (H): Status: ACTIVE | Noted: 2021-01-01

## 2021-07-15 NOTE — ED PROVIDER NOTES
EMERGENCY DEPARTMENT ENCOUNTER      NAME: Richie Gordillo  AGE: 80 year old male  YOB: 1940  MRN: [id number]  EVALUATION DATE & TIME: 7/15/2021 12:41 PM    PCP: Justo Tom    ED PROVIDER: Rey Mo M.D.      Chief Complaint   Patient presents with     Cough     Shortness of Breath       FINAL IMPRESSION:  1. Acute and chronic respiratory failure with hypoxia (H)    2. COPD with acute exacerbation (H)        ED COURSE & MEDICAL DECISION MAKIN:53 PM I introduced myself to the patient. I wore the following PPE: gloves, eye protection, and an N95 mask.   3:42 PM I spoke with Dr. Lornezana from [facility].     80 year old male presents to the Emergency Department for evaluation of shortness of breath.  History of obstructive lung disease as well as congestive heart failure with preserved ejection fraction.  He is quite labored in his breathing on arrival, sitting up in bed, tachypneic, only able to speak very short sentences.  He had quite a bit of wheezing in all lung fields as well as a frequent productive cough.  He was placed on BiPAP immediately, given inline DuoNeb's, magnesium, and Solu-Medrol.  On repeat evaluation his breathing was substantially less labored.  He underwent a chest x-ray which was clear of any new airspace disease or pulmonary edema.  At this point he also has an elevated BNP.  Troponin and EKG are reassuring.  Other basic labs stable and reassuring.  I will add on doxycycline for him given his productive cough and COPD exacerbation.  Given his elevated BNP and strong history of CHF with probable mild increase in his lower extremity edema, also started diuresis with 40 mg IV Lasix.  Patient was attempted to be weaned off of BiPAP however he desaturated readily into the mid 80s despite nasal cannula oxygen so this was replaced.  Nevertheless his condition appears substantially improved. Given his absence of significant chest x-ray findings as well as his  hypoxia, did also obtain a CT pulmonary angiogram which was ultimately read as negative although did have some findings concerning for bronchitis. He'll be admitted to the cardiac telemetry floor for ongoing management of respiratory failure.      Critical Care  Performed by: Rey Mo MD  Authorized by: Rey Mo MD     Total critical care time: 60 minutes  Critical care time was exclusive of separately billable procedures and treating other patients.  Critical care was necessary to treat or prevent imminent or life-threatening deterioration of the following conditions: Respiratory failure requiring noninvasive ventilation.  Critical care was time spent personally by me on the following activities: development of treatment plan with patient or surrogate, discussions with consultants, examination of patient, evaluation of patient's response to treatment, obtaining history from patient or surrogate, ordering and performing treatments and interventions, ordering and review of laboratory studies, ordering and review of radiographic studies and re-evaluation of patient's condition, this excludes any separately billable procedures.      MEDICATIONS GIVEN IN THE EMERGENCY:  Medications   ipratropium - albuterol 0.5 mg/2.5 mg/3 mL (DUONEB) neb solution 3 mL (3 mLs Nebulization Given 7/15/21 1304)   ipratropium - albuterol 0.5 mg/2.5 mg/3 mL (DUONEB) neb solution 3 mL (3 mLs Nebulization Given 7/15/21 1304)   ipratropium - albuterol 0.5 mg/2.5 mg/3 mL (DUONEB) neb solution 3 mL (3 mLs Nebulization Given 7/15/21 1302)   methylPREDNISolone sodium succinate (solu-MEDROL) injection 125 mg (125 mg Intravenous Given 7/15/21 1310)   magnesium sulfate 2 g in water intermittent infusion (0 g Intravenous Stopped 7/15/21 1539)   doxycycline (VIBRAMYCIN) 100 mg vial to attach to  mL bag (0 mg Intravenous Stopped 7/15/21 1711)   furosemide (LASIX) injection 40 mg (40 mg Intravenous Given 7/15/21 1539)       NEW  PRESCRIPTIONS STARTED AT TODAY'S ER VISIT  Current Discharge Medication List             =================================================================    HPI    Patient information was obtained from: Patient    Use of : N/A      Richie Gordillo is a 80 year old male with a pertinent history of HTN, chronic heart failure, CKD, COPD, and hyperlipidemia who presents to this ED via walk-in for evaluation of a cough and shortness of breath.     Patient reports shortness of breath and a clear productive cough, starting about four days ago. Cough has worsened over the past day.     Patient does not use inhaler at home. Patient denies any leg swelling, leg pain, chest pain, abdominal pain, nausea, and vomiting. Patient reports tightness in chest and difficulties breathing.     Patient has had no contact with sick or infected people. Patient is COVID vaccinated.     No other complaints at this time.     REVIEW OF SYSTEMS   All systems reviewed and negative except as noted in HPI.    PAST MEDICAL HISTORY:  Past Medical History:   Diagnosis Date     Acute on chronic diastolic congestive heart failure (H) 5/21/2021     Arthritis      Atrial fibrillation (H)      BPH (benign prostatic hyperplasia)      Central retinal artery occlusion, right eye      Chronic kidney disease     CKD     CRAO (central retinal artery occlusion), right      Essential tremor      History of alcohol abuse     in remission     HTN (hypertension)      Hyperlipidemia      Hyponatremia      Involutional ectropion of right eye      Leg mass, left      Murmur      Other specified disorders resulting from impaired renal function      secondary to bladder outlet obstruction; resolved       Phthisis bulbi of right eye      PVD (peripheral vascular disease) (H)      Sleep apnea      Stroke (H)     TIA       PAST SURGICAL HISTORY:  Past Surgical History:   Procedure Laterality Date     CARDIOVERSION  01/30/2020     CATARACT EXTRACTION        CATARACT IOL, RT/LT       DIODE LASER CYCLOPHOTOCOAGULATION OD (RIGHT EYE) Right      EVISCERATION EYE Right 10/30/2019    Procedure: Evisceration Right Eye, Tarsorrhaphy;  Surgeon: Moe Mims MD;  Location: [facility] OR     EXCISE LESION LOWER EXTREMITY Left 2017    Procedure: EXCISION LEFT POSTERIOR LEG MASS;  Surgeon: Joseluis Fernandez MD;  Location: [facility];  Service:      EXTERNAL EAR SURGERY      mastoid     EYE SURGERY       HC TRANSURETHRAL ELEC-SURG PROSTATECTOM      Description: Transurethral Resection Of Prostate (TURP);  Recorded: 2009;     ILIAC ARTERY STENT Right     twice,  and      IR ABDOMINAL AORTOGRAM  2009     IR ABDOMINAL AORTOGRAM  2010     IR EXTREMITY ANGIOGRAM BILATERAL  2009     IR MISCELLANEOUS PROCEDURE  2009     IR MISCELLANEOUS PROCEDURE  2009     IR MISCELLANEOUS PROCEDURE  2010           CURRENT MEDICATIONS:    No current facility-administered medications for this encounter.         ALLERGIES:  Allergies   Allergen Reactions     Ofloxacin Hives     Quinolones Itching       FAMILY HISTORY:  Family History   Problem Relation Age of Onset     Glaucoma No family hx of      Macular Degeneration No family hx of      Brain Cancer Father          age 76     Pancreatic Cancer Mother          age 52     Cancer Brother         metastatic, unknown primary     Heart Disease Brother        SOCIAL HISTORY:   Social History     Socioeconomic History     Marital status:      Spouse name: Not on file     Number of children: Not on file     Years of education: Not on file     Highest education level: Not on file   Occupational History     Not on file   Tobacco Use     Smoking status: Former Smoker     Packs/day: 0.25     Types: Cigarettes, Cigarettes     Start date: 1962     Quit date: 2021     Years since quittin.4     Smokeless tobacco: Never Used     Tobacco comment: Quit 21 and restarted 20, and quit again  "on 1/23/21, started again until quitting for 1 week in 4/2021, and started again on 4/18/21   Substance and Sexual Activity     Alcohol use: No     Comment: Alcoholic Drinks/day: hx alcohol abuse in remission     Drug use: No     Sexual activity: Yes     Partners: Female   Other Topics Concern     Not on file   Social History Narrative    He is a retired  and last worked at [employer]. He also was a dental technician. He is  and has 3 children and 8 grandchildren.     Social Determinants of Health     Financial Resource Strain:      Difficulty of Paying Living Expenses:    Food Insecurity:      Worried About Running Out of Food in the Last Year:      Ran Out of Food in the Last Year:    Transportation Needs:      Lack of Transportation (Medical):      Lack of Transportation (Non-Medical):    Physical Activity:      Days of Exercise per Week:      Minutes of Exercise per Session:    Stress:      Feeling of Stress :    Social Connections:      Frequency of Communication with Friends and Family:      Frequency of Social Gatherings with Friends and Family:      Attends [employer] Services:      Active Member of Clubs or Organizations:      Attends Club or Organization Meetings:      Marital Status:    Intimate Partner Violence:      Fear of Current or Ex-Partner:      Emotionally Abused:      Physically Abused:      Sexually Abused:        VITALS:  BP (!) 186/83   Pulse 67   Temp 98.9  F (37.2  C) (Oral)   Resp 25   Ht 1.727 m (5' 8\")   Wt 59.9 kg (132 lb)   SpO2 96%   BMI 20.07 kg/m      PHYSICAL EXAM    Constitutional: Thin chronically ill-appearing elderly male patient, sitting up in bed, moderate respiratory distress.  HENT: Normocephalic, Atraumatic. Neck Supple.  Eyes: EOMI, Conjunctiva normal.  Respiratory: Tachypneic, wheezing in all lung fields with somewhat diminished air movement.  Coarse lung sounds bilaterally.  Moderate respiratory distress.  Cardiovascular: Normal heart " rate, Regular rhythm. Trace peripheral edema.  Abdomen: Soft, nontender.  Musculoskeletal: Good range of motion in all major joints. No major deformities noted.  Integument: Warm, Dry.  Neurologic: Alert & awake, Normal motor function, Normal sensory function, No focal deficits noted.   Psychiatric: Cooperative. Affect appropriate.     LAB:  All pertinent labs reviewed and interpreted.  Labs Ordered and Resulted from Time of ED Arrival Up to the Time of Departure from the ED   BASIC METABOLIC PANEL - Abnormal; Notable for the following components:       Result Value    Sodium 129 (*)     Chloride 91 (*)     Creatinine 1.46 (*)     GFR Estimate 45 (*)     All other components within normal limits   CRP INFLAMMATION - Abnormal; Notable for the following components:    CRP 1.9 (*)     All other components within normal limits   CBC WITH PLATELETS AND DIFFERENTIAL - Abnormal; Notable for the following components:    Absolute Immature Granulocytes 0.1 (*)     All other components within normal limits   B-TYPE NATRIURETIC PEPTIDE ( EAST ONLY) - Abnormal; Notable for the following components:    BNP 1,268 (*)     All other components within normal limits   SARS-COV2 (COVID-19) VIRUS RT-PCR - Normal    Narrative:     Testing was performed using the tosha  SARS-CoV-2 & Influenza A/B Assay on the tosha  Fanny  System.  This test should be ordered for the detection of SARS-COV-2 in individuals who meet SARS-CoV-2 clinical and/or epidemiological criteria. Test performance is unknown in asymptomatic patients.  This test is for in vitro diagnostic use under the FDA EUA for laboratories certified under CLIA to perform moderate and/or high complexity testing. This test has not been FDA cleared or approved.  A negative test does not rule out the presence of PCR inhibitors in the specimen or target RNA in concentration below the limit of detection for the assay. The possibility of a false negative should be considered if the  patient's recent exposure or clinical presentation suggests COVID-19.  [facility] are certified under the Clinical Laboratory Improvement Amendments of 1988 (CLIA-88) as qualified to perform moderate and/or high complexity laboratory testing.   TROPONIN I - Normal   PERIPHERAL IV CATHETER   CALL   CALL   COVID-19 VIRUS (CORONAVIRUS) BY PCR    Narrative:     The following orders were created for panel order Symptomatic COVID-19 Virus (Coronavirus) by PCR Nasopharyngeal.  Procedure                               Abnormality         Status                     ---------                               -----------         ------                     SARS-COV2 (COVID-19) Vir...[[id number]]  Normal              Final result                 Please view results for these tests on the individual orders.   CBC WITH PLATELETS & DIFFERENTIAL    Narrative:     The following orders were created for panel order CBC with platelets + differential.  Procedure                               Abnormality         Status                     ---------                               -----------         ------                     CBC with platelets and d...[[id number]]  Abnormal            Final result                 Please view results for these tests on the individual orders.       RADIOLOGY:  Reviewed all pertinent imaging. Please see official radiology report.  CT Chest Pulmonary Embolism w Contrast   Final Result   IMPRESSION:   1.  No PE.   2.  Bronchial wall thickening can be seen with bronchitis.  Emphysema is present.   3.  Suspected left ventricular hypertrophy. There is severe atherosclerotic disease including coronary artery disease.      XR Chest Port 1 View   Final Result   IMPRESSION: Hyperinflation with some stable scattered fibrosis most prominently in the lung bases.      No acute new findings.          EKG:    Performed at: [facility] Emergency Room    15-July-2021 12:45:53    Impression: Sinus  rhythm with sinus arrhythmia with 1st degree A-V block. Nonspecific ST abnormality.     Rate: 84 BPM  Rhythm: Sinus rhythm with sinus arrhythmia with 1st degree A-V block  Axis: 94 - 22 - 63  DC Interval: 224 ms  QRS Interval: 78 ms  QTc Interval: 394/465 ms  ST Changes: Nonspecific ST abnormality  Comparison: 18-May-2021 08:08. Premature atrial complexes are no longer present.     I have independently reviewed and interpreted the EKG(s) documented above.      I, Alberta Barone, am serving as a scribe to document services personally performed by Dr. Rey Mo, based on my observation and the provider's statements to me. I, Rey Mo MD attest that Alberta Barone is acting in a scribe capacity, has observed my performance of the services and has documented them in accordance with my direction.    Rey Mo M.D.  Emergency Medicine  [facility] EMERGENCY ROOM  [address] [id number] [phone]  Dept: [phone]     Rey Mo MD  07/15/21 [id number]

## 2021-07-15 NOTE — ED TRIAGE NOTES
4 day history of cough and chest congestion.  Occasional fever. Leaning forward to breath.  Tachypnea. O2 sats 88% on room air. Up to 92% on 2 liters nasal cannula.  History of COPD

## 2021-07-15 NOTE — TELEPHONE ENCOUNTER
I would advise evaluation in the ER.  Symptoms are worrisome for pneumonia especially with shortness of breath.  Although he has been immunized, also cannot exclude COVID-19 and evaluation in ER is more appropriate than walk-in clinic.

## 2021-07-15 NOTE — ED NOTES
Pt trialed on 2lpm via nc  sats dipped into the high 80s but when asked deep breath pursed lip he brought his sats to low 90s. Pt placed back on bi pap with the original setting

## 2021-07-15 NOTE — TELEPHONE ENCOUNTER
RN triage - call from pt   Pt states sick x 3+ days --   Cough and lots of congestion   Yesterday T = 99.4- today = 98 --   Also headache - sl sore throat - swallow and drinking OK   Pt having some trouble breathing and sleeping -- some improvement w/ sitting up and after spiriva and albuterol inhalers -- but improvements do not last long  Denies severe diff breathing now   Reviewed home care advice and when to be seen urgently   Transferred to      Manuela Lion RN  [facility]  Triage Nurse Advisor    COVID 19 Nurse Triage Plan/Patient Instructions    Please be aware that novel coronavirus (COVID-19) may be circulating in the community. If you develop symptoms such as fever, cough, or SOB or if you have concerns about the presence of another infection including coronavirus (COVID-19), please contact your health care provider or visit [email or web address].     Disposition/Instructions    Virtual Visit with provider recommended. Reference Visit Selection Guide.    Thank you for taking steps to prevent the spread of this virus.  o Limit your contact with others.  o Wear a simple mask to cover your cough.  o Wash your hands well and often.    Resources    [facility]: About COVID-19: [email or web address]    CDC: What to Do If You're Sick: www.cdc.gov/coronavirus/2019-ncov/about/steps-when-sick.html    CDC: Ending Home Isolation: www.cdc.gov/coronavirus/2019-ncov/hcp/disposition-in-home-patients.html     CDC: Caring for Someone: www.cdc.gov/coronavirus/2019-ncov/if-you-are-sick/care-for-someone.html     [facility]: Interim Guidance for Hospital Discharge to Home: www.health.[facility].[place]/diseases/coronavirus/hcp/hospdischarge.pdf    [facility] clinical trials (COVID-19 research studies): [email or web address]     Below are the COVID-19 hotlines at the [place] Department of Health ([facility]). Interpreters are available.   o For health questions: Call 867-432-5691 or  1-204.693.4131 (7 a.m. to 7 p.m.)  o For questions about schools and childcare: Call 887-830-2844 or 1-131.679.2876 (7 a.m. to 7 p.m.)                       Reason for Disposition    [1] HIGH RISK patient (e.g., age > 64 years, diabetes, heart or lung disease, weak immune system) AND [2] new or worsening symptoms    Additional Information    Negative: SEVERE difficulty breathing (e.g., struggling for each breath, speaks in single words)    Negative: Difficult to awaken or acting confused (e.g., disoriented, slurred speech)    Negative: Bluish (or gray) lips or face now    Negative: Shock suspected (e.g., cold/pale/clammy skin, too weak to stand, low BP, rapid pulse)    Negative: Sounds like a life-threatening emergency to the triager    Negative: SEVERE or constant chest pain or pressure (Exception: mild central chest pain, present only when coughing)    Negative: MODERATE difficulty breathing (e.g., speaks in phrases, SOB even at rest, pulse 100-120)    Negative: [1] Headache AND [2] stiff neck (can't touch chin to chest)    Negative: Fever > 103 F (39.4 C)    Negative: [1] Fever > 101 F (38.3 C) AND [2] age > 60 years    Negative: [1] Fever > 100.0 F (37.8 C) AND [2] bedridden (e.g., nursing home patient, CVA, chronic illness, recovering from surgery)    Protocols used: CORONAVIRUS (COVID-19) DIAGNOSED OR [id number] 3.25

## 2021-07-15 NOTE — H&P
[facility] MEDICINE ADMISSION HISTORY AND PHYSICAL     Assessment & Plan      Richie Gordillo is a 80 year old old male with history of chronic kidney disease, hypertension, sleep apnea, peripheral vascular disease, COPD, and chronic diastolic congestive heart failure presents to the hospital complaining of worsening shortness of breath.     Assessment & Plan    Acute hypoxic respiratory failure/acute COPD exacerbation.  Steroids, antibiotics, scheduled nebs, BiPAP or supplemental oxygen as needed.  Monitor response to treatment.  Sputum culture to be obtained.  Acute on chronic diastolic congestive heart failure.  Schedule IV Lasix.  Patient follows with Dr. Perez of cardiology.  Consultation will be placed as mandated by the order set.  Essential hypertension.  Home medications with hold parameters.  Patient is not on an ACE inhibitor likely due to his chronic kidney disease.  Obstructive sleep apnea.  Does not wear CPAP.  Monitor oxygen overnight.  Again we can use BiPAP.  Moderate aortic stenosis.  Had recent echocardiogram.  Defer need for further imaging to cardiology.  Peripheral vascular disease.  Remote history of alcohol abuse.  Acute hyponatremia.  Mild.  Patient has intermittent history of this.  Should improve with diuresis.  Chronic kidney disease stage IIIb.  Renal function is stable.  Monitor renal function with diuresis.      DVTP: Direct Oral Anticagulants   Code Status: Full code per patient request.    Disposition: Inpatient   Goals for the hospitalization: Resolution of hypoxia and dyspnea    Chief Complaint  of breath     HISTORY     Richie Gordillo is a 80 year old old male with history of chronic diastolic congestive heart failure and COPD presents with worsening shortness of breath.  Patient was found to be hypoxic and required BiPAP in the emergency department to maintain sats.  Still desaturating into the upper 80s when BiPAP removed.  Patient was recently  hospitalized in May 2021 for COPD exacerbation with acute on chronic diastolic heart failure.  Echocardiogram from that hospitalization revealed an EF of 65% and right ventricular hypertrophy.  There was moderate aortic stenosis.  Patient also had a nuclear stress test back in January 2021 which was read as normal.  Patient has had slight edema of the lower extremities.  Worsening dyspnea.  Cough has been productive with sputum change per his report.  Feeling much better after being on BiPAP.in the ER.  Denies any chest pain.  Has some abdominal muscle pain with cough.  No fevers or chills.  Sputum has been yellowish goldish in color.  He typically does not cough up sputum.    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  No date: Leg mass, left  No date: Murmur  No date: Other specified disorders resulting from impaired renal   function      Comment:   secondary to bladder outlet obstruction; resolved    No date: Phthisis bulbi of right eye  No date: PVD (peripheral vascular disease) (H)  No date: Sleep apnea  No date: Stroke (H)      Comment:  TIA     Surgical History     Past Surgical History:   Procedure Laterality Date     CARDIOVERSION  01/30/2020     CATARACT EXTRACTION       CATARACT IOL, RT/LT       DIODE LASER CYCLOPHOTOCOAGULATION OD (RIGHT EYE) Right      EVISCERATION EYE Right 10/30/2019    Procedure: Evisceration Right Eye, Tarsorrhaphy;  Surgeon: Moe Mims,  MD;  Location: [facility] OR     EXCISE LESION LOWER EXTREMITY Left 2017    Procedure: EXCISION LEFT POSTERIOR LEG MASS;  Surgeon: Joseluis Fernandez MD;  Location: [facility] Main OR;  Service:      EXTERNAL EAR SURGERY      mastoid     EYE SURGERY       HC TRANSURETHRAL ELEC-SURG PROSTATECTOM      Description: Transurethral Resection Of Prostate (TURP);  Recorded: 2009;     ILIAC ARTERY STENT Right     twice,  and      IR ABDOMINAL AORTOGRAM  2009     IR ABDOMINAL AORTOGRAM  2010     IR EXTREMITY ANGIOGRAM BILATERAL  2009     IR MISCELLANEOUS PROCEDURE  2009     IR MISCELLANEOUS PROCEDURE  2009     IR MISCELLANEOUS PROCEDURE  2010     Family History    Reviewed, and   Family History   Problem Relation Age of Onset     Glaucoma No family hx of      Macular Degeneration No family hx of      Brain Cancer Father          age 76     Pancreatic Cancer Mother          age 52     Cancer Brother         metastatic, unknown primary     Heart Disease Brother         Social History      Social History     Tobacco Use     Smoking status: Former Smoker     Packs/day: 0.25     Types: Cigarettes, Cigarettes     Start date: 1962     Quit date: 2021     Years since quittin.4     Smokeless tobacco: Never Used     Tobacco comment: Quit 21 and restarted 20, and quit again on 21, started again until quitting for 1 week in 2021, and started again on 21   Substance Use Topics     Alcohol use: No     Comment: Alcoholic Drinks/day: hx alcohol abuse in remission     Drug use: No        Allergies     Allergies   Allergen Reactions     Ofloxacin Hives     Quinolones Itching     Prior to Admission Medications      Prior to Admission Medications   Prescriptions Last Dose Informant Patient Reported? Taking?   Multiple Vitamins-Minerals (ONCOVITE) TABS 2021 at Unknown time  Yes Yes   Sig: Take 1 tablet by mouth daily   Omega 3-6-9 Fatty Acids (OMEGA 3-6-9 PO)  7/15/2021 at Unknown time  Yes Yes   Sig: Take 1,000 mg by mouth daily   albuterol (PROAIR HFA/PROVENTIL HFA/VENTOLIN HFA) 108 (90 Base) MCG/ACT inhaler 7/15/2021 at am  Yes Yes   Sig: Inhale 2 puffs into the lungs every 4 hours as needed    amLODIPine (NORVASC) 10 MG tablet 7/15/2021 at Unknown time  Yes Yes   Sig: Take 10 mg by mouth daily   aspirin 81 MG EC tablet 7/15/2021 at Unknown time  Yes Yes   Sig: Take 81 mg by mouth daily   atorvastatin (LIPITOR) 40 MG tablet 7/14/2021 at Unknown time  Yes Yes   Sig: Take 40 mg by mouth every evening    cholecalciferol (VITAMIN D3) 10 mcg (400 units) TABS tablet 7/15/2021 at Unknown time  Yes Yes   Sig: Take 1,000 Units by mouth daily   furosemide (LASIX) 40 MG tablet 7/15/2021 at Unknown time  Yes Yes   Sig: Take 40 mg by mouth daily    mirtazapine (REMERON) 30 MG tablet 7/14/2021 at Unknown time  Yes Yes   Sig: Take 30 mg by mouth At Bedtime    rivaroxaban ANTICOAGULANT (XARELTO) 15 MG TABS tablet 7/14/2021 at Unknown time  Yes Yes   Sig: Take 15 mg by mouth daily (with dinner)    sotalol (BETAPACE) 80 MG tablet 7/15/2021 at am  Yes Yes   Sig: Take 80 mg by mouth 2 times daily    tamsulosin (FLOMAX) 0.4 MG capsule 7/14/2021 at Unknown time  Yes Yes   Sig: Take 0.4 mg by mouth daily    tiotropium (SPIRIVA HANDIHALER) 18 MCG inhaled capsule 7/14/2021 at Unknown time  Yes Yes   Sig: Inhale 2 puffs into the lungs daily    vitamin B-12 (CYANOCOBALAMIN) 500 MCG tablet 7/14/2021 at Unknown time  Yes Yes   Sig: Take 1,000 mcg by mouth daily   vitamin E 400 units TABS 7/14/2021 at Unknown time  Yes Yes   Sig: Take 400 Units by mouth daily      Facility-Administered Medications: None      Review of Systems     A 12 point comprehensive review of systems was negative except as noted above in HPI.    PHYSICAL EXAMINATION       Vitals      Temp:  [98.9  F (37.2  C)] 98.9  F (37.2  C)  Pulse:  [81-83] 81  Resp:  [28-34] 34  BP: (121)/(80) 121/80  SpO2:  [88 %-96 %] 96  %    Examination     GENERAL:  Alert, appears comfortable, in no acute distress, appears stated age   HEAD:  Normocephalic, without obvious abnormality, atraumatic   EYES:  PERRL, conjunctiva/corneas clear, no scleral icterus, EOM's intact   NECK: Supple, symmetrical, trachea midline, mild JVD noted   BACK:   Symmetric, no curvature, ROM normal   LUNGS:    Distant breath sounds while on BiPAP, intermittent rhonchi and crackles in the bases without audible wheezes   CHEST WALL:  No tenderness or deformity   HEART:  Regular rate and rhythm, S1 and S2 normal, no murmur, rub, or gallop    ABDOMEN:   Soft, non-tender, bowel sounds active all four quadrants, no masses, no organomegaly, no rebound or guarding   EXTREMITIES: Extremities normal, atraumatic, no cyanosis, 1+ pitting edema   SKIN: Dry to touch, no exanthems in the visualized areas   NEURO: Alert, oriented x 4, moves all four extremities freely, non-focal   PSYCH: Cooperative, behavior is appropriate      Pertinent Lab     Most Recent 3 BMP's:Recent Labs   Lab Test 07/15/21  1254 05/21/21  1429 05/19/21  0455   * 141 136   POTASSIUM 4.4 4.2 4.8   CHLORIDE 91* 99 100   CO2 29 31 28   BUN 15 42* 53*   CR 1.46* 1.58* 1.95*   ANIONGAP 9 11 8   KELSEY 8.9 8.9 8.4*    114 125     Most Recent 3 Hemoglobins:Recent Labs   Lab Test 07/15/21  1254 05/18/21  0447 05/17/21  1733   HGB 13.6 11.4* 13.0*     Most Recent 3 BNP's:No lab results found.      Pertinent Radiology     Radiology Results:   Recent Results (from the past 24 hour(s))   XR Chest Port 1 View    Narrative    EXAM: XR CHEST PORT 1 VIEW  LOCATION: [facility]  DATE/TIME: 7/15/2021 1:45 PM    INDICATION: Dyspnea, cough, respiratory distress  COMPARISON: 05/17/2021      Impression    IMPRESSION: Hyperinflation with some stable scattered fibrosis most prominently in the lung bases.    No acute new findings.   CT Chest Pulmonary Embolism w Contrast    Narrative    EXAM: CT CHEST PULMONARY  EMBOLISM W CONTRAST  LOCATION: [facility]  DATE/TIME: 7/15/2021 3:40 PM    INDICATION: Dyspnea, hypoxia, COPD exacerbation.   COMPARISON: 03/10/2020.  TECHNIQUE: CT chest pulmonary angiogram during arterial phase injection of IV contrast. Multiplanar reformats and MIP reconstructions were performed. Dose reduction techniques were used.   CONTRAST: Isovue-370 56mL     FINDINGS:  ANGIOGRAM CHEST: Pulmonary arteries are normal caliber and negative for pulmonary emboli. Thoracic aorta is negative for dissection.    LUNGS AND PLEURA: There is diffuse bronchial wall thickening with a small amount of debris in the airways.  Mild scarring in the lingula and right middle lobe is again seen. Emphysema is present.    MEDIASTINUM/AXILLAE: Mitral annular calcification. Aortic leaflet calcification. Suspected left ventricular hypertrophy.     CORONARY ARTERY CALCIFICATION: Severe.    UPPER ABDOMEN: Colonic diverticulosis. Atherosclerotic disease.     MUSCULOSKELETAL: Gynecomastia.       Impression    IMPRESSION:  1.  No PE.  2.  Bronchial wall thickening can be seen with bronchitis.  Emphysema is present.  3.  Suspected left ventricular hypertrophy. There is severe atherosclerotic disease including coronary artery disease.     EKG Results: personally reviewed.     Advance Care Planning      An anticipate ticipate greater than 2 midnight stay in the hospital due to COPD no requiring IV diuresis  Anticipate greater than 2 midnight stay in the hospital due to acute COPD exacerbation and acute diastolic congestive heart failure requiring IV diuresis.    Mark Lorenzana MD  Hospitalist  [facility]   Phone: #[phone]

## 2021-07-15 NOTE — ED NOTES
Pt seen in the ED. Pt placed on Bipap due to work of breathing. Pt was using accessory muscle upon arrival. Pt was given duoneb x3. BS diminshed. Bipap settings 12/6 16 30% and is tolerating it well. Accessory muscles still noted but less severe. RT will continue to monitor.    Candi Suero, RT

## 2021-07-15 NOTE — PHARMACY-ADMISSION MEDICATION HISTORY
Pharmacy Note - Admission Medication History    Pertinent Provider Information:      ______________________________________________________________________    Prior To Admission (PTA) med list completed and updated in EMR.       Prior to Admission Medications   Prescriptions Last Dose Informant Patient Reported? Taking?   Multiple Vitamins-Minerals (ONCOVITE) TABS 7/14/2021 at Unknown time  Yes Yes   Sig: Take 1 tablet by mouth daily   Omega 3-6-9 Fatty Acids (OMEGA 3-6-9 PO) 7/15/2021 at Unknown time  Yes Yes   Sig: Take 1,000 mg by mouth daily   albuterol (PROAIR HFA/PROVENTIL HFA/VENTOLIN HFA) 108 (90 Base) MCG/ACT inhaler 7/15/2021 at am  Yes Yes   Sig: Inhale 2 puffs into the lungs every 4 hours as needed    amLODIPine (NORVASC) 10 MG tablet 7/15/2021 at Unknown time  Yes Yes   Sig: Take 10 mg by mouth daily   aspirin 81 MG EC tablet 7/15/2021 at Unknown time  Yes Yes   Sig: Take 81 mg by mouth daily   atorvastatin (LIPITOR) 40 MG tablet 7/14/2021 at Unknown time  Yes Yes   Sig: Take 40 mg by mouth every evening    cholecalciferol (VITAMIN D3) 10 mcg (400 units) TABS tablet 7/15/2021 at Unknown time  Yes Yes   Sig: Take 1,000 Units by mouth daily   furosemide (LASIX) 40 MG tablet 7/15/2021 at Unknown time  Yes Yes   Sig: Take 40 mg by mouth daily    mirtazapine (REMERON) 30 MG tablet 7/14/2021 at Unknown time  Yes Yes   Sig: Take 30 mg by mouth At Bedtime    rivaroxaban ANTICOAGULANT (XARELTO) 15 MG TABS tablet 7/14/2021 at Unknown time  Yes Yes   Sig: Take 15 mg by mouth daily (with dinner)    sotalol (BETAPACE) 80 MG tablet 7/15/2021 at am  Yes Yes   Sig: Take 80 mg by mouth 2 times daily    tamsulosin (FLOMAX) 0.4 MG capsule 7/14/2021 at Unknown time  Yes Yes   Sig: Take 0.4 mg by mouth daily    tiotropium (SPIRIVA HANDIHALER) 18 MCG inhaled capsule 7/14/2021 at Unknown time  Yes Yes   Sig: Inhale 2 puffs into the lungs daily    vitamin B-12 (CYANOCOBALAMIN) 500 MCG tablet 7/14/2021 at Unknown time  Yes Yes    Sig: Take 1,000 mcg by mouth daily   vitamin E 400 units TABS 7/14/2021 at Unknown time  Yes Yes   Sig: Take 400 Units by mouth daily      Facility-Administered Medications: None       Information source(s): Patient, Clinic records and CareEverywhere/[facility]  Method of interview communication: in-person    Summary of Changes to PTA Med List  New: asa  Discontinued: diltiazem, losartan, Maxitrol, Zocor, Toprolol  Changed: amlodipine is now 10mg daily    Patient was asked about OTC/herbal products specifically.  PTA med list reflects this.    In the past week, patient estimated taking medication this percent of the time:  greater than 90%.    Allergies were reviewed, assessed, and updated with the patient.      Patient did not bring any medications to the hospital and can't retrieve from home. No multi-dose medications are available for use during hospital stay.     The information provided in this note is only as accurate as the sources available at the time of the update(s).    Thank you for the opportunity to participate in the care of this patient.    Thomas Lamb [facility]  7/15/2021 2:16 PM

## 2021-07-15 NOTE — PROGRESS NOTES
Richie is a 80 year old who is being evaluated via a billable video visit.      How would you like to obtain your AVS? Mail a copy  If the video visit is dropped, the invitation should be resent by: Text to cell phone: [phone]  Will anyone else be joining your video visit? No    Video Start Time: 11:50 AM      Subjective   Richie is a 80 year old who presents for the following health issues     Short of breath, fever  Has been getting acutely worse over the last 2 days, more short of breath, low-grade fevers approximately 100  F, his wife is also on the video call saying that he is much worse.  Coughing up phlegm  Was hospitalized for heart failure in May 2021    Possibilities are here are acute pneumonia/COPD exacerbation or acute congestive heart failure.    I told Mr. Gordillo and his wife that he seems too sick to go through a clinic evaluation, and they would like to bring him to the emergency department and I agree.    I spoke to Dr. Hodge in the [facility] ED, and they will be expecting him.      History:  Chronic obstructive pulmonary disease  possibly with some diastolic congestive heart failure with that as well.    hospitalization on May 17, 2021 for increasing shortness of breath and lower extremity edema.  He was treated both for diastolic congestive heart failure and COPD exacerbation.  Last chest CT scan was March 2020    History of sleep apnea but does not tolerate CPAP.     Spiriva prescription to come on order.     Plan chest CT scan for screening after August physical.     Chronic heart failure with preserved ejection fraction (H)  furosemide 40 mg daily.     Chronic renal insufficiency, does not tolerate ACE inhibitor or ARB.  Tolerating lower blood pressure with amlodipine 10 mg a day.  May 21 labs reviewed with creatinine back to baseline at 1.58.     Paroxysmal atrial fibrillation (H)  On sotalol twice daily.  Xarelto, in addition to low-dose aspirin.     Carotid artery stenosis, asymptomatic,  bilateral  Asymptomatic.  History of cerebellar infarct.  Aspirin in addition to Xarelto.  Lipitor 40 mg.     Moderate aortic valve stenosis  echo from May 18, 2021 in the hospital.  Aortic stenosis stable at mild to moderate.  Ejection fraction 65%.  No PFO.     History of colonic polyps  large polyps, x3.  colonoscopy from May 2021 which showed 11-15 mm sessile serrated adenoma as well as an 8 mm polyp and a 16-20 mm polyp.  They did not give him a follow-up plan for his colonoscopy, at age 80.       History of squamous cell carcinoma of skin     BPH (benign prostatic hyperplasia)  - tamsulosin (FLOMAX) 0.4 mg cap     Low back pain and stiffness,       Cerebellar infarct in 2015 on imaging.  Severe peripheral vascular disease.  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  Bilateral carotid artery stenosis 50-69% on August 2019 ultrasound.  Right iliac stent history.     History of sleep apnea but does not tolerate CPAP.      History of alcohol, no longer drinking regularly.     history of squamous cell cancer of the skin    History of TURP, urination stable on Flomax    Immunization History   Administered Date(s) Administered     COVID-19,PF,Pfizer 02/10/2021, 03/03/2021     DT (PEDS <7y) 09/21/2005     Flu, Unspecified 11/14/2011     Influenza (High Dose) 3 valent vaccine 02/13/2019, 09/26/2019     Influenza Vaccine, 6+MO IM (QUADRIVALENT W/PRESERVATIVES) 01/14/2013     Pneumo Conj 13-V (2010&after) 04/06/2017     Pneumococcal 23 valent 03/03/2009     Td,adult,historic,unspecified 09/21/2005     Tdap (Adacel,Boostrix) 09/28/2013     Zoster vaccine, live 12/08/2011         Objective       Video-Visit Details    Type of service:  Video Visit    Video End Time: 12:00 PM    Originating Location (pt. Location): Home    Distant Location (provider location):  [facility]     Platform used for Video Visit: Austyn

## 2021-07-15 NOTE — ED NOTES
Expected Patient Referral to ED  12:09 PM    Referring Clinic/Provider:  Internal Medicine Clinic    Reason for referral/Clinical facts:  81y/o male with increasing SOB and cough.  Concern for possible pneumonia.  Patient was seen by PCM by telemedicine.      Caller was informed that this institution does  possess the capabilities and/or resources to provide for patient and should be transferred to our institution.    Based on the information provided, discussed that this patient likely is not a good candidate for direct admission to this institution and that provider could proceed as such.  If however direct admit is sought and any hurdles encountered, this ED would be happy to see the patient and evaluate.    Informed caller that recommendations provided are recommendations based only on the facts provided and that they responsible to accept or reject the advice, or to seek a formal in person consultation as needed and that this ED will see/treat patient should they arrive.      ALIX HODGE DO  Emergency Medicine  [facility] EMERGENCY ROOM  [address] [address]  [phone]       Alix Hodge DO  07/15/21 1210

## 2021-07-16 NOTE — PLAN OF CARE
Problem: Adult Inpatient Plan of Care  Goal: Plan of Care Review  Outcome: Improving     Problem: Adult Inpatient Plan of Care  Goal: Optimal Comfort and Wellbeing  Outcome: Improving     Problem: Adult Inpatient Plan of Care  Goal: Absence of Hospital-Acquired Illness or Injury  Outcome: Improving     6PM admitted to unit, tolerating BiPAP well, settings at 12/6 with 30% FiO2, managed by RT. Lung sounds diminished with inspiratory/expiratory wheezes, SpO2= 96%.

## 2021-07-16 NOTE — PROGRESS NOTES
[facility] Medicine PROGRESS NOTE      Identification/Summary:   80-year-old male with a history of congestive heart failure and COPD presents with shortness of breath and probable mild volume overload.  Still requiring BiPAP hospital day #2.  Will change to IV steroids and expand antibiotics to IV ceftriaxone and doxycycline.  Get sputum if able.    Assessment and Plan:  Acute hypoxic respiratory failure/acute COPD exacerbation.  Steroids, antibiotics, scheduled nebs, BiPAP or supplemental oxygen as needed.  Change to IV steroids and IV antibiotics.  Sputum culture please.  Goal oxygen saturation of 88 to 92%.  Check VBG in the morning.  Acute on chronic diastolic congestive heart failure.    Appreciate cardiology input.  Continue IV diuresis.  Trend renal function.  Essential hypertension.  Home medications with hold parameters.  Patient is not on an ACE inhibitor likely due to his chronic kidney disease.  Chronic atrial fibrillation.  Obstructive sleep apnea.  Does not wear CPAP.  Monitor oxygen overnight.  Again we can use BiPAP.  Moderate aortic stenosis.  Had recent echocardiogram.  Defer need for further imaging to cardiology.  Peripheral vascular disease.  Remote history of alcohol abuse.  Acute hyponatremia.  Mild.  Patient has intermittent history of this.  Should improve with diuresis.  Chronic kidney disease stage IIIb.  Renal function is stable.  Monitor renal function with diuresis.    Diet: 2 Gram Sodium Diet Other - please comment  DVT Prophylaxis:  DOAC  Code Status: Full Code    Anticipated possible discharge in 2 days.    Interval History/Subjective:  Patient is doing okay.  Feels better than he did when he was admitted but still short of breath requiring BiPAP even at rest.  No fevers or chills.  No nausea or vomiting.  Continues to cough.  Minimally productive.  We have not yet gotten the sputum.  Will broaden antibiotics and increase steroids.    Physical Exam/Objective:  Vitals I/O    [unfilled]  I/O last 3 completed shifts:  In: 240 [P.O.:240]  Out: 600 [Urine:600]   [unfilled]  Body mass index is 20.79 kg/m .      GENERAL:  Alert, appears comfortable, in no acute distress, appears stated age   HEAD:  Normocephalic, without obvious abnormality, atraumatic   EYES:  PERRL, conjunctiva/corneas clear, no scleral icterus, EOM's intact   NECK: Supple, symmetrical, trachea midline   BACK:   Symmetric, no curvature, ROM normal   LUNGS:   Clear to auscultation bilaterally, no rales, rhonchi, or wheezing, symmetric chest rise on inhalation, respirations unlabored   CHEST WALL:  No tenderness or deformity   HEART:  Regular rate and rhythm, S1 and S2 normal, no murmur, rub, or gallop    ABDOMEN:   Soft, non-tender, bowel sounds active all four quadrants, no masses, no organomegaly, no rebound or guarding   EXTREMITIES: Extremities normal, atraumatic, no cyanosis or edema    SKIN: Dry to touch, no exanthems in the visualized areas   NEURO: Alert, oriented x3, moves all four extremities freely, non-focal   PSYCH: Cooperative, behavior is appropriate        Medications:   Personally Reviewed.    amLODIPine  10 mg Oral Daily     aspirin  81 mg Oral Daily     atorvastatin  40 mg Oral QPM     cyanocobalamin  1,000 mcg Oral Daily     doxycycline hyclate  100 mg Oral BID     furosemide  60 mg Intravenous Q8H     guaiFENesin  1,200 mg Oral BID     ipratropium - albuterol 0.5 mg/2.5 mg/3 mL  3 mL Nebulization 4x daily     mirtazapine  30 mg Oral At Bedtime     predniSONE  40 mg Oral Daily     rivaroxaban ANTICOAGULANT  15 mg Oral Daily with supper     sotalol  40 mg Oral BID     tamsulosin  0.4 mg Oral Daily     [Held by provider] tiotropium  18 mcg Inhalation BID       EKG: Personally reviewed.        Lab Results:  Personally Reviewed.   Recent Labs   Lab 07/16/21  0535 07/15/21  1254   WBC 6.9 8.1   HGB 12.7* 13.6   HCT 37.4* 40.3    259     Recent Labs   Lab 07/16/21  0535 07/15/21  1254   * 129*    CO2 27 29   BUN 27 15     No results for input(s): INR in the last 168 hours.    Mark Lorenzana MD  Hospitalist  [facility]

## 2021-07-16 NOTE — CONSULTS
Care Management Initial Consult    General Information  Assessment completed with: Patient,    Type of CM/SW Visit: Initial Assessment    Primary Care Provider verified and updated as needed:     Readmission within the last 30 days:        Reason for Consult: discharge planning  Advance Care Planning:            Communication Assessment  Patient's communication style: spoken language (English or Bilingual)    Hearing Difficulty or Deaf: no   Wear Glasses or Blind: yes    Cognitive  Cognitive/Neuro/Behavioral: WDL                      Living Environment:   People in home: spouse, grandchild(jeff), other (see comments) (grandaupaolo's  and child)     Current living Arrangements: house      Able to return to prior arrangements: yes       Family/Social Support:  Care provided by: self  Provides care for: no one  Marital Status:   Wife, Children, Other (specify) (family)          Description of Support System: Supportive, Involved    Support Assessment: Adequate family and caregiver support, Adequate social supports    Current Resources:   Patient receiving home care services: No     Community Resources: None  Equipment currently used at home: none  Supplies currently used at home: None    Employment/Financial:  Employment Status:          Financial Concerns:             Lifestyle & Psychosocial Needs:  Social Determinants of Health     Tobacco Use: Medium Risk     Smoking Tobacco Use: Former Smoker     Smokeless Tobacco Use: Never Used   Alcohol Use:      Frequency of Alcohol Consumption:      Average Number of Drinks:      Frequency of Binge Drinking:    Financial Resource Strain:      Difficulty of Paying Living Expenses:    Food Insecurity:      Worried About Running Out of Food in the Last Year:      Ran Out of Food in the Last Year:    Transportation Needs:      Lack of Transportation (Medical):      Lack of Transportation (Non-Medical):    Physical Activity:      Days of Exercise per Week:       Minutes of Exercise per Session:    Stress:      Feeling of Stress :    Social Connections:      Frequency of Communication with Friends and Family:      Frequency of Social Gatherings with Friends and Family:      Attends [employer] Services:      Active Member of Clubs or Organizations:      Attends Club or Organization Meetings:      Marital Status:    Intimate Partner Violence:      Fear of Current or Ex-Partner:      Emotionally Abused:      Physically Abused:      Sexually Abused:    Depression: Not at risk     PHQ-2 Score: 0   Housing Stability:      Unable to Pay for Housing in the Last Year:      Number of Places Lived in the Last Year:      Unstable Housing in the Last Year:                Additional Information:  SW met with pt for initial assessment. Pt reports normally living with his wife, granddaughter, granddaughter's  and daughter. He reports he is normally independent with no services at home. Pt denies having any needs at home or for discharge. He denies being on oxygen at baseline but reports that he thinks he may need this moving forward. SW will continue to follow for increased needs.    JAQUELINE Botello

## 2021-07-16 NOTE — PROGRESS NOTES
"CLINICAL NUTRITION SERVICES - ASSESSMENT NOTE     Nutrition Prescription    RECOMMENDATIONS FOR MDs/PROVIDERS TO ORDER:  None at this time     Malnutrition Status:    Non-severe malnutrition noted     Recommendations already ordered by Registered Dietitian (RD):  None at this time     Future/Additional Recommendations:  If pt changes his mind on supplements offer Ensure enlive      REASON FOR ASSESSMENT  Richie Gordillo is a/an 80 year old male assessed by the dietitian for Nutrition Risk Screen. Pt admitted with COPD exacerbation, HTN, sleep apnea, hx ETOH abuse, CKD    NUTRITION HISTORY  He states he follows a low sodium diet and has no question on this. He will occasionally drink muscle milk but doesn't want supplements here. He is aware he can ask if he would like to start them.     CURRENT NUTRITION ORDERS  Diet:2g Na   Intake/Tolerance: 50% due to SOB    LABS  Labs reviewed; Na-128, Cr-1.71, Gluc-141    MEDICATIONS  Medications reviewed; Lasix, Solu-medrol    ANTHROPOMETRICS  Height: 172.7 cm (5' 8\")  Most Recent Weight: 62 kg (136 lb 11.2 oz)    IBW: 70 kg  BMI: Normal BMI  Weight History:   Wt Readings from Last 10 Encounters:   07/16/21 62 kg (136 lb 11.2 oz)   06/22/21 62.6 kg (137 lb 14.4 oz)   06/07/21 62.1 kg (137 lb)   05/21/21 63 kg (139 lb)   05/17/21 66.2 kg (146 lb)   04/20/21 63 kg (139 lb)   02/17/21 63 kg (139 lb)   02/09/21 62.1 kg (137 lb)   01/07/21 61.7 kg (136 lb)   12/29/20 61.2 kg (135 lb)       Dosing Weight: 62 kg    ASSESSED NUTRITION NEEDS  Estimated Energy Needs: 5589-8981 kcals/day (30 - 35 kcals/kg )  Justification: Increased needs  Estimated Protein Needs: 62-75 grams protein/day (1 - 1.2 grams of pro/kg)  Justification: Maintenance  Estimated Fluid Needs: 8047-2325 mL/day (25 - 30 mL/kg)   Justification: Maintenance    PHYSICAL FINDINGS  See malnutrition section below.  Trace edema noted in flowsheet     MALNUTRITION  % Intake: </=75% for >/= 1 month (severe)  % Weight Loss: None " noted  Subcutaneous Fat Loss: Facial region:  moderate and Upper arm: moderate  Muscle Loss: Temporal:  moderate, Facial & jaw region:  moderate and Lower arm  (forearm):  moderate  Fluid Accumulation/Edema: Mild  Malnutrition Diagnosis: Non-severe malnutrition in the context of chronic    NUTRITION DIAGNOSIS  Malnutrition related to copd as evidenced by reduced po, fat and muscle loss       INTERVENTIONS  Implementation  Pt refused interventions at this time. If he decides to try a supplement offer Ensure Enlive     Goals  Meet est needs  Maintain wt      Monitoring/Evaluation  Progress toward goals will be monitored and evaluated per protocol.

## 2021-07-16 NOTE — PROVIDER NOTIFICATION
Bipap on SB pt RR is 25 nnot 75     07/15/21 1932   Tech Time   $Tech Time (10 minute increments) 6   Breath Sounds   Breath Sounds All Fields   All Lung Fields Breath Sounds diminished;wheezes, expiratory   Nebulizer Pre Assessment   $RT Use ONLY Delivery Method Nebulizer - Initial   Nebulizer Device Mouthpiece   $Demo/Eval Nebulizer/Inhaler - RT ONLY Completed   Pretreatment Resp Rate (breaths/min) 75   Pretreatment O2 sats - (TCU only) 91   Pretreat Breath Sounds - JADE wheezes, expiratory   Pretreat Breath Sounds - LLL diminished   Breath Sounds Post-Respiratory Treatment   Posttreatment Heart Rate (beats/min) 69   Posttreatment Resp Rate (breaths/min) 98   Post treatment O2 Sats - (TCU only) 98   Breath Sounds Posttreatment All Fields aeration increased   Oxygen Therapy   Flow (L/min) 3   Device (Oxygen Therapy) nasal cannula   Aerosol Therapy (SVN)   Respiratory Treatment Status (SVN) given   Treatment Route (SVN) mask   Patient Position Yfn

## 2021-07-16 NOTE — PROVIDER NOTIFICATION
07/16/21 1331   CPAP/BiPAP/Settings   IPAP/EPAP (cmH2O) 12/6   Rate (breaths/min) 16   Oxygen (%) 30   Timed Inspiration (sec) 1   CPAP/BiPAP Patient Parameters   RR Total (breaths/min) 21 breaths/min   Vt (mL) 630 mL   Minute Ventilation (L/min) 13 L/min   Peak Inspiratory Pressure (cm H2O) 13 cmH2O   Pt.  Leak (L/min) 49 L/min       Patient has been on bipap most of the day. Patient has come off for some breaks to eat. Breath sounds are diminished. Non-productive loose cough. Patient will continue to wear bipap through the day. Duonebs are 4 times a day.

## 2021-07-16 NOTE — CONSULTS
[facility] [address], [place] [address]   [email or web address]   OFFICE: [phone]        Impression and Plan     1.  Shortness of breath.  Suspect multifactorial in nature, but with component of pulmonary edema/hypervolemia.  Patient with well-preserved left ventricular systolic performance, however, suspected impaired diastolic filling.  B-type natriuretic peptide this admission elevated at 1268 pg/mL consistent with myocardial strain.    Agree with trial of diuresis and will follow clinical efficacy.    2.  Coronary artery disease.  Richie has known coronary artery disease by virtue of CT scan this admission revealing evidence of significant coronary calcification.  Evert underwent regadenoson nuclear perfusion imaging 14 January 2021 which revealed no evidence of infarct or ischemia.  Troponin on admission suggests against any recent myocardial injury.    3.  Aortic stenosis.  This was felt mild to moderate in degree by echocardiogram 18 May 2021 with mean gradient of 13 mmHg and peak velocity of 2.5 m/s. VRVTI = 0.5. VRvel = 0.4.    4.  Atrial flutter.  Patient with history of atrial flutter.  Currently sinus rhythm.QTc reasonable on ECG at 465 ms.    Continue sotalol, however, given increasing creatinine would reduce to 40 mg twice daily and continue to follow QTC.    Continue rivaroxaban.    5.  Hypertension.    Continue amlodipine.    Continue ?-blocker therapy, sotalol.    Continue furosemide.    6.  Dyslipidemia.  Lipid profile 9 February 2021 revealed LDL 65 mg/dL and HDL 36 mg/dL.  Continue a atorvastatin.    7.  COPD exacerbation.  Management plan as outlined by Dr. Mark Lorenzana.    Discussed briefly with Hospitalist Service, Dr. Mark Lorenzana.    Primary Cardiologist: Dr. Fabian Perez    History of Present Illness    Richie Gordillo is a 80 year old male who presents with worsening shortness of breath.  Patient on  presentation hypoxic and started on BiPAP therapy in the Emergency Department.    Patient feels his breathing is perhaps slightly improved this morning.  Continues to deny chest pain.  No pain or discomfort otherwise.  Denies any subjective fevers, chills, or other constitutional symptoms.    Further review of systems is otherwise negative/noncontributory (medical record and 13 point review of systems reviewed as well and pertinent positives noted).    Cardiac Diagnostics   Telemetry (personally reviewed): Sinus rhythm with heart rate in the 70s.    Echocardiogram 18 May 2021 (personally reviewed):  1. Normal left ventricular size and systolic performance with a visually estimated ejection fraction of 65%.   2. There is mild concentric increase in left ventricular wall thickness.   3. There is mild to moderate aortic stenosis.   4. Normal right ventricular size and systolic performance.     When compared to the prior real-time echocardiogram dated 3 December 2019, the findings are felt to be fairly similar on both examinations.  Of note, on the prior examination a patent foramen ovale (PFO) was identified though not seen on the current study (the atrial septum is not well visualized from the subcostal view on the current study).    Regadenoson nuclear perfusion study 14 January 2021:  1. The nuclear stress test is negative for inducible myocardial ischemia or infarction.   2. The left ventricular ejection fraction at stress is 58%.   3. There is no prior study for comparison.    Twelve-lead ECG (personally reviewed) 15 July 2021: Sinus rhythm with sinus arrhythmia and first-degree AV block.  Mild nonspecific T wave changes.    Medical History  Surgical History   Past Medical History:   Diagnosis Date     Acute on chronic diastolic congestive heart failure (H) 5/21/2021     Arthritis      Atrial fibrillation (H)      BPH (benign prostatic hyperplasia)      Central retinal artery occlusion, right eye      Chronic  kidney disease     CKD     CRAO (central retinal artery occlusion), right      Essential tremor      History of alcohol abuse     in remission     HTN (hypertension)      Hyperlipidemia      Hyponatremia      Involutional ectropion of right eye      Leg mass, left      Murmur      Other specified disorders resulting from impaired renal function      secondary to bladder outlet obstruction; resolved       Phthisis bulbi of right eye      PVD (peripheral vascular disease) (H)      Sleep apnea      Stroke (H)     TIA      Past Surgical History:   Procedure Laterality Date     CARDIOVERSION  2020     CATARACT EXTRACTION       CATARACT IOL, RT/LT       DIODE LASER CYCLOPHOTOCOAGULATION OD (RIGHT EYE) Right      EVISCERATION EYE Right 10/30/2019    Procedure: Evisceration Right Eye, Tarsorrhaphy;  Surgeon: Moe Mims MD;  Location: [facility] OR     EXCISE LESION LOWER EXTREMITY Left 2017    Procedure: EXCISION LEFT POSTERIOR LEG MASS;  Surgeon: Joseluis Fernandez MD;  Location: [facility];  Service:      EXTERNAL EAR SURGERY      mastoid     EYE SURGERY       HC TRANSURETHRAL ELEC-SURG PROSTATECTOM      Description: Transurethral Resection Of Prostate (TURP);  Recorded: 2009;     ILIAC ARTERY STENT Right     twice,  and      IR ABDOMINAL AORTOGRAM  2009     IR ABDOMINAL AORTOGRAM  2010     IR EXTREMITY ANGIOGRAM BILATERAL  2009     IR MISCELLANEOUS PROCEDURE  2009     IR MISCELLANEOUS PROCEDURE  2009     IR MISCELLANEOUS PROCEDURE  2010          Family History/Social History/Risk Factors   Patient does not smoke.  Family history reviewed, and   Family History   Problem Relation Age of Onset     Glaucoma No family hx of      Macular Degeneration No family hx of      Brain Cancer Father          age 76     Pancreatic Cancer Mother          age 52     Cancer Brother         metastatic, unknown primary     Heart Disease Brother           Physical Examination  "  BP (!) 180/84 (BP Location: Left arm)   Pulse 66   Temp 97.4  F (36.3  C) (Oral)   Resp 18   Ht 1.727 m (5' 8\")   Wt 62 kg (136 lb 11.2 oz)   SpO2 95%   BMI 20.79 kg/m    Wt Readings from Last 5 Encounters:   07/16/21 62 kg (136 lb 11.2 oz)   06/22/21 62.6 kg (137 lb 14.4 oz)   06/07/21 62.1 kg (137 lb)   05/21/21 63 kg (139 lb)   05/17/21 66.2 kg (146 lb)     Wt Readings from Last 3 Encounters:   07/16/21 62 kg (136 lb 11.2 oz)   06/22/21 62.6 kg (137 lb 14.4 oz)   06/07/21 62.1 kg (137 lb)       Intake/Output Summary (Last 24 hours) at 7/16/2021 0701  Last data filed at 7/16/2021 0400  Gross per 24 hour   Intake 240 ml   Output 600 ml   Net -360 ml       The patient is alert and oriented times three. Sclerae are anicteric. Mucosal membranes are moist. Jugular venous pressure is estimated at 10-11 cm.  No significant adenopathy/thyromegally appreciated. Lungs are diminished bilaterally with some occasional coarse bronchogenic breath sounds/wheezes. On cardiovascular exam, the patient has a regular S1 and S2.  Heart auscultation somewhat limited by airway noise.  Abdomen is soft and non-tender. Extremities reveal no clubbing, cyanosis, with mild edema.         Imaging      CT scan of chest 15 July 2021:  1. No PE.   2. Bronchial wall thickening can be seen with bronchitis.  Emphysema is present.   3. Suspected left ventricular hypertrophy. There is severe atherosclerotic disease including coronary artery disease.    Chest radiograph 15 July 2021:  1. Hyperinflation with some stable scattered fibrosis most prominently in the lung bases.   2. No acute new findings.    Lab Results     Recent Labs   Lab 07/16/21  0535 07/15/21  1809 07/15/21  1254   WBC 6.9  --  8.1   HGB 12.7*  --  13.6   MCV 87  --  87     --  259   *  --  129*   POTASSIUM 4.6 4.5 4.4   CHLORIDE 91*  --  91*   CO2 27  --  29   BUN 27  --  15   CR 1.71*  --  1.46*   ANIONGAP 10  --  9   KELSEY 8.6  --  8.9   *  --  114 "     Recent Labs   Lab Test 07/15/21  1254 05/17/21  1733 07/09/20  1135   BNP 1,268* 1,262* 270*     No lab results found in last 7 days.    Invalid input(s): LDLCALC  Lab Results   Component Value Date     07/16/2021    CO2 27 07/16/2021    BUN 27 07/16/2021     Lab Results   Component Value Date    WBC 6.9 07/16/2021    HGB 12.7 07/16/2021    HCT 37.4 07/16/2021    MCV 87 07/16/2021     07/16/2021     Lab Results   Component Value Date    CHOL 116 02/09/2021    TRIG 73 02/09/2021    HDL 36 02/09/2021     Recent Labs   Lab Test 02/09/21  1039   CHOL 116   HDL 36*   LDL 65   TRIG 73     No results found for: INR  Lab Results   Component Value Date    TROPONINI 0.04 07/15/2021    TROPONINI 0.04 05/18/2021    TROPONINI 0.04 05/17/2021     Lab Results   Component Value Date    TSH 1.40 12/02/2019         Current Inpatient Scheduled Medications   Scheduled Meds:    amLODIPine  10 mg Oral Daily     aspirin  81 mg Oral Daily     atorvastatin  40 mg Oral QPM     cyanocobalamin  1,000 mcg Oral Daily     doxycycline hyclate  100 mg Oral BID     furosemide  60 mg Intravenous Q8H     guaiFENesin  1,200 mg Oral BID     ipratropium - albuterol 0.5 mg/2.5 mg/3 mL  3 mL Nebulization 4x daily     mirtazapine  30 mg Oral At Bedtime     predniSONE  40 mg Oral Daily     rivaroxaban ANTICOAGULANT  15 mg Oral Daily with supper     sotalol  80 mg Oral BID     tamsulosin  0.4 mg Oral Daily     [Held by provider] tiotropium  18 mcg Inhalation BID     Continuous Infusions:    ACE/ARB/ARNI NOT PRESCRIBED            Medications Prior to Admission   Prior to Admission medications    Medication Sig Start Date End Date Taking? Authorizing Provider   albuterol (PROAIR HFA/PROVENTIL HFA/VENTOLIN HFA) 108 (90 Base) MCG/ACT inhaler Inhale 2 puffs into the lungs every 4 hours as needed  5/20/21  Yes Reported, Patient   amLODIPine (NORVASC) 10 MG tablet Take 10 mg by mouth daily   Yes Unknown, Entered By History   aspirin 81 MG EC tablet  Take 81 mg by mouth daily   Yes Unknown, Entered By History   atorvastatin (LIPITOR) 40 MG tablet Take 40 mg by mouth every evening  4/6/21  Yes Reported, Patient   cholecalciferol (VITAMIN D3) 10 mcg (400 units) TABS tablet Take 1,000 Units by mouth daily   Yes Reported, Patient   furosemide (LASIX) 40 MG tablet Take 40 mg by mouth daily  12/6/19  Yes Reported, Patient   mirtazapine (REMERON) 30 MG tablet Take 30 mg by mouth At Bedtime  5/17/19  Yes Reported, Patient   Multiple Vitamins-Minerals (ONCOVITE) TABS Take 1 tablet by mouth daily   Yes Reported, Patient   Omega 3-6-9 Fatty Acids (OMEGA 3-6-9 PO) Take 1,000 mg by mouth daily   Yes Reported, Patient   rivaroxaban ANTICOAGULANT (XARELTO) 15 MG TABS tablet Take 15 mg by mouth daily (with dinner)  12/17/19  Yes Reported, Patient   sotalol (BETAPACE) 80 MG tablet Take 80 mg by mouth 2 times daily  7/1/20  Yes Reported, Patient   tamsulosin (FLOMAX) 0.4 MG capsule Take 0.4 mg by mouth daily  6/10/19  Yes Reported, Patient   tiotropium (SPIRIVA HANDIHALER) 18 MCG inhaled capsule Inhale 2 puffs into the lungs daily  6/4/21  Yes Reported, Patient   vitamin B-12 (CYANOCOBALAMIN) 500 MCG tablet Take 1,000 mcg by mouth daily   Yes Reported, Patient   vitamin E 400 units TABS Take 400 Units by mouth daily   Yes Reported, Patient

## 2021-07-16 NOTE — PLAN OF CARE
Patient is alert and oriented. Becomes very dyspneic with activity. Patient has been on Bipap most of the shift. Lungs diminished with expiratory wheezes. Receiving IV lasix and scheduled nebs.

## 2021-07-16 NOTE — TREATMENT PLAN
[facility] Treatment Plan    Patient Score: 11  Patient Acuity: 3    Clinical Indication for Therapy: copd    Therapy Ordered:Duonebs QID and PRN    Assessment Summary: Pt former smoker. COPD,CHF. BBS wheezing. He does have home albuterol inhaler x2 puffs PRN.    Freida Brown, RT  7/15/2021

## 2021-07-17 NOTE — PLAN OF CARE
Problem: Adjustment to Illness COPD (Chronic Obstructive Pulmonary Disease)  Goal: Optimal Chronic Illness Coping  Outcome: No Change     Problem: Functional Ability Impaired COPD (Chronic Obstructive Pulmonary Disease)  Goal: Optimal Level of Functional [place]  Intervention: Optimize Functional Ability  Recent Flowsheet Documentation  Taken 7/16/2021 2030 by Deirdre Herrera, RN  Activity Management: activity adjusted per tolerance  Taken 7/16/2021 1645 by Deirdre Herrera, RN  Activity Management: activity adjusted per tolerance     Pt with FOSTER. Continues on 30% FiO2, tolerating well.

## 2021-07-17 NOTE — PROGRESS NOTES
[facility] [address], [place] [address]   [email or web address]   OFFICE: [phone]            Impression and Plan     1.  Shortness of breath.  Suspect multifactorial in nature, but with component of pulmonary edema/hypervolemia on presentation.  Patient with well-preserved left ventricular systolic performance, however, suspected impaired diastolic filling.  B-type natriuretic peptide this admission elevated at 1268 pg/mL consistent with myocardial strain.  Richie has had 1.8 L urine output over last 24 hours.  Breath sounds are noticeably diminished bilaterally, but no overt crackles.  Jugular venous pressure does not appear inordinately elevated.    Feel we can convert to enteral furosemide and will initiate 40 mg twice daily (had been on 40 mg once daily prior to admission).     2.  Coronary artery disease.  Richie has known coronary artery disease by virtue of CT scan this admission revealing evidence of significant coronary calcification.  Evert underwent regadenoson nuclear perfusion imaging 14 January 2021 which revealed no evidence of infarct or ischemia.  Troponin on admission suggests against any recent myocardial injury.  Richie denies chest discomfort.     3.  Aortic stenosis.  This was felt mild to moderate in degree by echocardiogram 18 May 2021 with mean gradient of 13 mmHg and peak velocity of 2.5 m/s. VRVTI = 0.5. VRvel = 0.4.     4.  Atrial flutter.  Patient with history of atrial flutter.      Continue sotalol 40 mg twice daily (reduced dosing this admission in light of somewhat elevated creatinine).    Continue rivaroxaban.     Will obtain twelve-lead ECG this morning to reassess QTc.     5.  Hypertension.    Continue amlodipine.    Continue ?-blocker therapy, sotalol.    Continue furosemide.     6.  Dyslipidemia.  Lipid profile 9 February 2021 revealed LDL 65 mg/dL and HDL 36 mg/dL.    Continue a atorvastatin.     7.  COPD  "exacerbation.  Management plan as outlined by Dr. Mark Lorenzana.     Primary Cardiologist: Dr. Fabian Perez       Subjective     Patient still with some shortness of breath, but feels somewhat improved.  Denies chest pain.    Cardiac Diagnostics   Telemetry (personally reviewed): Sinus rhythm with heart rate in the 60s    Echocardiogram 18 May 2021 (personally reviewed):  1. Normal left ventricular size and systolic performance with a visually estimated ejection fraction of 65%.   2. There is mild concentric increase in left ventricular wall thickness.   3. There is mild to moderate aortic stenosis.   4. Normal right ventricular size and systolic performance.     When compared to the prior real-time echocardiogram dated 3 December 2019, the findings are felt to be fairly similar on both examinations.  Of note, on the prior examination a patent foramen ovale (PFO) was identified though not seen on the current study (the atrial septum is not well visualized from the subcostal view on the current study).     Regadenoson nuclear perfusion study 14 January 2021:  1. The nuclear stress test is negative for inducible myocardial ischemia or infarction.   2. The left ventricular ejection fraction at stress is 58%.   3. There is no prior study for comparison.     Twelve-lead ECG (personally reviewed) 15 July 2021: Sinus rhythm with sinus arrhythmia and first-degree AV block.  Mild nonspecific T wave changes.       Physical Examination       BP (!) 198/93 (BP Location: Left arm)   Pulse 64   Temp 97.8  F (36.6  C) (Oral)   Resp 16   Ht 1.727 m (5' 8\")   Wt 61.5 kg (135 lb 9.6 oz)   SpO2 96%   BMI 20.62 kg/m          Wt Readings from Last 5 Encounters:   07/17/21 61.5 kg (135 lb 9.6 oz)   06/22/21 62.6 kg (137 lb 14.4 oz)   06/07/21 62.1 kg (137 lb)   05/21/21 63 kg (139 lb)   05/17/21 66.2 kg (146 lb)             Intake/Output Summary (Last 24 hours) at 7/17/2021 0736  Last data filed at 7/17/2021 " 0633  Gross per 24 hour   Intake 500 ml   Output 1775 ml   Net -1275 ml       The patient is alert and oriented times three. Sclerae are anicteric. Mucosal membranes are moist. Jugular venous pressure appears reasonable.  No significant adenopathy/thyromegally appreciated. Lungs noticeably diminished bilaterally.. On cardiovascular exam, the patient has a regular S1 and S2.  Systolic murmur without change.  Abdomen is soft and non-tender. Extremities reveal no clubbing, cyanosis, or significant edema.         Imaging       CT scan of chest 15 July 2021:  5. No PE.   6. Bronchial wall thickening can be seen with bronchitis.  Emphysema is present.   7. Suspected left ventricular hypertrophy. There is severe atherosclerotic disease including coronary artery disease.     Chest radiograph 15 July 2021:  1. Hyperinflation with some stable scattered fibrosis most prominently in the lung bases.   2. No acute new findings.     Lab Results     Recent Labs   Lab 07/17/21  0609 07/16/21  0535 07/15/21  1809 07/15/21  1254   WBC  --  6.9  --  8.1   HGB  --  12.7*  --  13.6   MCV  --  87  --  87   PLT  --  227  --  259   NA  --  128*  --  129*   POTASSIUM 4.6 4.6 4.5 4.4   CHLORIDE  --  91*  --  91*   CO2  --  27  --  29   BUN  --  27  --  15   CR  --  1.71*  --  1.46*   ANIONGAP  --  10  --  9   KELSEY  --  8.6  --  8.9   GLC  --  141*  --  114     Recent Labs   Lab Test 07/15/21  1254 05/17/21  1733 07/09/20  1135   BNP 1,268* 1,262* 270*     No lab results found in last 7 days.    Invalid input(s): LDLCALC  Lab Results   Component Value Date     07/16/2021    CO2 27 07/16/2021    BUN 27 07/16/2021     Lab Results   Component Value Date    WBC 6.9 07/16/2021    HGB 12.7 07/16/2021    HCT 37.4 07/16/2021    MCV 87 07/16/2021     07/16/2021     Lab Results   Component Value Date    CHOL 116 02/09/2021    TRIG 73 02/09/2021    HDL 36 02/09/2021     No results found for: INR  Lab Results   Component Value Date     TROPONINI 0.04 07/15/2021    TROPONINI 0.04 05/18/2021    TROPONINI 0.04 05/17/2021     Lab Results   Component Value Date    TSH 1.40 12/02/2019           Current Inpatient Scheduled Medications   Scheduled Meds:    amLODIPine  10 mg Oral Daily     aspirin  81 mg Oral Daily     atorvastatin  40 mg Oral QPM     cefTRIAXone  1 g Intravenous Q24H     cyanocobalamin  1,000 mcg Oral Daily     doxycycline (VIBRAMYCIN) IV  100 mg Intravenous Q12H     furosemide  60 mg Intravenous Q8H     guaiFENesin  1,200 mg Oral BID     ipratropium - albuterol 0.5 mg/2.5 mg/3 mL  3 mL Nebulization 4x daily     methylPREDNISolone  40 mg Intravenous Q8H     mirtazapine  30 mg Oral At Bedtime     rivaroxaban ANTICOAGULANT  15 mg Oral Daily with supper     sotalol  40 mg Oral BID     tamsulosin  0.4 mg Oral Daily     [Held by provider] tiotropium  18 mcg Inhalation BID     Continuous Infusions:    ACE/ARB/ARNI NOT PRESCRIBED              Medications Prior to Admission   Prior to Admission medications    Medication Sig Start Date End Date Taking? Authorizing Provider   albuterol (PROAIR HFA/PROVENTIL HFA/VENTOLIN HFA) 108 (90 Base) MCG/ACT inhaler Inhale 2 puffs into the lungs every 4 hours as needed  5/20/21  Yes Reported, Patient   amLODIPine (NORVASC) 10 MG tablet Take 10 mg by mouth daily   Yes Unknown, Entered By History   aspirin 81 MG EC tablet Take 81 mg by mouth daily   Yes Unknown, Entered By History   atorvastatin (LIPITOR) 40 MG tablet Take 40 mg by mouth every evening  4/6/21  Yes Reported, Patient   cholecalciferol (VITAMIN D3) 10 mcg (400 units) TABS tablet Take 1,000 Units by mouth daily   Yes Reported, Patient   furosemide (LASIX) 40 MG tablet Take 40 mg by mouth daily  12/6/19  Yes Reported, Patient   mirtazapine (REMERON) 30 MG tablet Take 30 mg by mouth At Bedtime  5/17/19  Yes Reported, Patient   Multiple Vitamins-Minerals (ONCOVITE) TABS Take 1 tablet by mouth daily   Yes Reported, Patient   Omega 3-6-9 Fatty Acids  (OMEGA 3-6-9 PO) Take 1,000 mg by mouth daily   Yes Reported, Patient   rivaroxaban ANTICOAGULANT (XARELTO) 15 MG TABS tablet Take 15 mg by mouth daily (with dinner)  12/17/19  Yes Reported, Patient   sotalol (BETAPACE) 80 MG tablet Take 80 mg by mouth 2 times daily  7/1/20  Yes Reported, Patient   tamsulosin (FLOMAX) 0.4 MG capsule Take 0.4 mg by mouth daily  6/10/19  Yes Reported, Patient   tiotropium (SPIRIVA HANDIHALER) 18 MCG inhaled capsule Inhale 2 puffs into the lungs daily  6/4/21  Yes Reported, Patient   vitamin B-12 (CYANOCOBALAMIN) 500 MCG tablet Take 1,000 mcg by mouth daily   Yes Reported, Patient   vitamin E 400 units TABS Take 400 Units by mouth daily   Yes Reported, Patient

## 2021-07-17 NOTE — PLAN OF CARE
Problem: Adult Inpatient Plan of Care  Goal: Plan of Care Review  Outcome: Improving   NSR on tele. Pt tolerated Bipap at 30% overnight.   IV anx. Denies pain.

## 2021-07-17 NOTE — PROGRESS NOTES
Patient remains on BiPAP no changes made this shift. Patient received one nebulizer treatment. BS decreased before neb Expiratory wheezes after neb.      Will continue to monitor.

## 2021-07-17 NOTE — PROGRESS NOTES
Patient off BiPAP all shift today, tolerated well, breathing improved throughout the day and currently is on RA. Tolerated nebs well with increased aeration post neb. States he does have KEISHA diagnosis but no CPAP at home. Will encourage BiPAP again tonight.

## 2021-07-17 NOTE — PLAN OF CARE
Patient has been weaned off of oxygen and Sats are 86 to 88 % goal is 86% to 92%.. Appetite was good for breakfast, yet patient declined to eat lunch. IV Lasix has been changed to oral dose twice a day. Patient has been strongly encouraged to quit smoking. Cough is congested sounding, but non productive.Telemetry shows a Normal Saline with 1 degree AV Block. Will continue to monitor.

## 2021-07-17 NOTE — PROGRESS NOTES
[facility] MEDICINE PROGRESS NOTE      Identification/Summary:  80-year-old male with a history of congestive heart failure and COPD presents with shortness of breath and probable mild volume overload.    Now off BiPAP possibly #3 wean O2.    Continue IV steroids and expand antibiotics to IV ceftriaxone and doxycycline.  Get sputum if able.  Transition back to oral antibiotics and likely home in a.m. if stable.     Assessment and Plan:  Acute hypoxic respiratory failure/acute COPD exacerbation.  Steroids, antibiotics, scheduled nebs, BiPAP or supplemental oxygen as needed.   Goal oxygen sats 88 to 92%.  Transitioning to oral antibiotics and steroids tomorrow if continues to improve.  Wean O2.  Acute on chronic diastolic congestive heart failure.    Appreciate cardiology input.  Continue IV diuresis.    Renal function is stable  Hyponatremia.  Improving sodium.  Essential hypertension.  Home medications with hold parameters.  Patient is not on an ACE inhibitor likely due to his chronic kidney disease.  Chronic atrial fibrillation.  Obstructive sleep apnea.  Does not wear CPAP.  Monitor oxygen overnight.  Again we can use BiPAP.  Moderate aortic stenosis.  Had recent echocardiogram.  Defer need for further imaging to cardiology.  Peripheral vascular disease.  Remote history of alcohol abuse.  Acute hyponatremia.  Mild.  Patient has intermittent history of this.  Should improve with diuresis.  Chronic kidney disease stage IIIb.  Renal function is stable.  Remains relatively stable.      Diet: 2 Gram Sodium Diet Other - please comment  DVT Prophylaxis: Direct oral anticoagulation  Code Status: Full Code      Interval History/Subjective:  Patient seen with family in the room.  Lots of questions.  Overall he is doing better.  Oxygen requirements decreasing.  Continue to cough and the cough seems to be loosening up.  Persistently short of breath even at rest and significantly dyspneic with  activity.  No lower extremity edema.  They understand he has diuresed a significant amount.  Hopeful that he can leave soon.    Physical Exam/Objective:  Temp:  [97.1  F (36.2  C)-98.1  F (36.7  C)] 97.6  F (36.4  C)  Pulse:  [63-74] 65  Resp:  [16-23] 20  BP: (136-200)/(63-95) 170/78  FiO2 (%):  [30 %] 30 %  SpO2:  [85 %-97 %] 93 %    Body mass index is 20.62 kg/m .    GENERAL:  Alert, appears comfortable, in no acute distress, appears stated age   HEAD:  Normocephalic, without obvious abnormality, atraumatic   EYES:  PERRL, conjunctiva/corneas clear, no scleral icterus, EOM's intact   NOSE: Nares normal, septum midline, mucosa normal, no drainage   THROAT: Lips, mucosa, and tongue normal; teeth and gums normal, mouth moist   NECK: Supple, symmetrical, trachea midline   BACK:   Symmetric, no curvature, ROM normal   LUNGS:    Diffuse rhonchi and end expiratory wheezes, good air movement but distant breath sounds   CHEST WALL:  No tenderness or deformity   HEART:  Regular rate and rhythm, S1 and S2 normal, no murmur, rub, or gallop    ABDOMEN:   Soft, non-tender, bowel sounds active all four quadrants, no masses, no organomegaly, no rebound or guarding   EXTREMITIES: Extremities normal, atraumatic, no cyanosis or edema    SKIN: Dry to touch, no exanthems in the visualized areas   NEURO: Alert, oriented x3, moves all four extremities freely   PSYCH: Cooperative, behavior is appropriate      Medications:   Personally Reviewed.  Medications     ACE/ARB/ARNI NOT PRESCRIBED         amLODIPine  10 mg Oral Daily     aspirin  81 mg Oral Daily     atorvastatin  40 mg Oral QPM     cefTRIAXone  1 g Intravenous Q24H     cyanocobalamin  1,000 mcg Oral Daily     doxycycline (VIBRAMYCIN) IV  100 mg Intravenous Q12H     furosemide  40 mg Oral BID     guaiFENesin  1,200 mg Oral BID     ipratropium - albuterol 0.5 mg/2.5 mg/3 mL  3 mL Nebulization 4x daily     methylPREDNISolone  40 mg Intravenous Q8H     mirtazapine  30 mg Oral At  Bedtime     rivaroxaban ANTICOAGULANT  15 mg Oral Daily with supper     sotalol  40 mg Oral BID     tamsulosin  0.4 mg Oral Daily     [Held by provider] tiotropium  18 mcg Inhalation BID       Data reviewed today: I personally reviewed all new medications, labs, imaging/diagnostics reports over the past 24 hours. Pertinent findings include:    Imaging:   No results found for this or any previous visit (from the past 24 hour(s)).    Labs:  Most Recent 3 CBC's:Recent Labs   Lab Test 07/16/21  0535 07/15/21  1254 05/18/21  0447   WBC 6.9 8.1 5.9   HGB 12.7* 13.6 11.4*   MCV 87 87 88    259 257     Most Recent 3 BMP's:Recent Labs   Lab Test 07/17/21  1318 07/17/21  0609 07/16/21  0535 07/15/21  1254   *  --  128* 129*   POTASSIUM 4.0 4.6 4.6 4.4   CHLORIDE 91*  --  91* 91*   CO2 29  --  27 29   BUN 47*  --  27 15   CR 1.86*  --  1.71* 1.46*   ANIONGAP 12  --  10 9   KELSEY 8.9  --  8.6 8.9   *  --  141* 114     tt 35 min > 50% spent in coordination of care, meeting with family etc    Mark Lorenzana MD  Hospitalist  [facility] Medicine  [facility]  Phone: #[phone]

## 2021-07-17 NOTE — PLAN OF CARE
Problem: Adjustment to Illness COPD (Chronic Obstructive Pulmonary Disease)  Goal: Optimal Chronic Illness Coping  Outcome: Improving     Problem: Functional Ability Impaired COPD (Chronic Obstructive Pulmonary Disease)  Goal: Optimal Level of Functional [place]  Outcome: Improving     Problem: Infection COPD (Chronic Obstructive Pulmonary Disease)  Goal: Absence of Infection Signs and Symptoms  Outcome: Improving     Problem: Respiratory Compromise COPD (Chronic Obstructive Pulmonary Disease)  Goal: Effective Oxygenation and Ventilation  Outcome: Improving

## 2021-07-18 NOTE — PLAN OF CARE
Physical Therapy Discharge Summary    Reason for therapy discharge:    All goals and outcomes met, no further needs identified.    Progress towards therapy goal(s). See goals on Care Plan in [facility] electronic health record for goal details.  Goals met    Therapy recommendation(s):    Continued therapy is recommended.  Rationale/Recommendations:  Recommend assessment by respiratory therapy to determine need for supp. O2 in the home.

## 2021-07-18 NOTE — PROGRESS NOTES
[facility]   [address], [place] [address]   [email or web address]   OFFICE: [phone]            Impression and Plan     1.  Shortness of breath.  Suspect multifactorial in nature, but with component of pulmonary edema/hypervolemia on presentation.  Patient responded favorably to diuresis and volume status appears improved.    Continue enteral furosemide 40 mg twice daily (had been on 40 mg once daily prior to admission).     2.  Coronary artery disease.  Richie has known coronary artery disease by virtue of CT scan this admission revealing evidence of significant coronary calcification.  Evert underwent regadenoson nuclear perfusion imaging 14 January 2021 which revealed no evidence of infarct or ischemia.  Troponin on admission suggests against any recent myocardial injury.  Richie denies chest discomfort.     3.  Aortic stenosis.  This was felt mild to moderate in degree by echocardiogram 18 May 2021 with mean gradient of 13 mmHg and peak velocity of 2.5 m/s. VRVTI = 0.5. VRvel = 0.4.     4.  Atrial flutter.  Patient with history of atrial flutter.      Continue sotalol 40 mg twice daily (reduced dosing this admission in light of elevated creatinine).  QTc has remained reasonable.    Continue rivaroxaban.      5.  Hypertension.  Blood pressure continues to run high.    Continue amlodipine.    Continue ?-blocker therapy, sotalol.    Continue furosemide.    Will add clonidine 0.1 mg twice daily.     6.  Dyslipidemia.  Lipid profile 9 February 2021 revealed LDL 65 mg/dL and HDL 36 mg/dL.    Continue a atorvastatin.     7.  COPD exacerbation.  Management plan as outlined by Dr. Mark Lorenzana.     Primary Cardiologist: Dr. Fabian Fuentes     Richie states that he feels much improved overall.  Breathing less labored.  Continues to deny chest pain.    Cardiac Diagnostics   Telemetry (personally reviewed): Sinus rhythm with heart rate in  "the 60s    Echocardiogram 18 May 2021 (personally reviewed):  1. Normal left ventricular size and systolic performance with a visually estimated ejection fraction of 65%.   2. There is mild concentric increase in left ventricular wall thickness.   3. There is mild to moderate aortic stenosis.   4. Normal right ventricular size and systolic performance.     When compared to the prior real-time echocardiogram dated 3 December 2019, the findings are felt to be fairly similar on both examinations.  Of note, on the prior examination a patent foramen ovale (PFO) was identified though not seen on the current study (the atrial septum is not well visualized from the subcostal view on the current study).     Regadenoson nuclear perfusion study 14 January 2021:  1. The nuclear stress test is negative for inducible myocardial ischemia or infarction.   2. The left ventricular ejection fraction at stress is 58%.   3. There is no prior study for comparison.     Twelve-lead ECG (personally reviewed) 15 July 2021: Sinus rhythm with sinus arrhythmia and first-degree AV block.  Mild nonspecific T wave changes.       Physical Examination       BP (!) 189/93 (BP Location: Left arm)   Pulse 77   Temp 98.4  F (36.9  C) (Oral)   Resp 18   Ht 1.727 m (5' 8\")   Wt 58.6 kg (129 lb 3.2 oz)   SpO2 96%   BMI 19.64 kg/m          Wt Readings from Last 5 Encounters:   07/18/21 58.6 kg (129 lb 3.2 oz)   06/22/21 62.6 kg (137 lb 14.4 oz)   06/07/21 62.1 kg (137 lb)   05/21/21 63 kg (139 lb)   05/17/21 66.2 kg (146 lb)             Intake/Output Summary (Last 24 hours) at 7/17/2021 0736  Last data filed at 7/17/2021 0633  Gross per 24 hour   Intake 500 ml   Output 1775 ml   Net -1275 ml       The patient is alert and oriented times three. Sclerae are anicteric. Mucosal membranes are moist. Jugular venous pressure appears reasonable.  No significant adenopathy/thyromegally appreciated. Lungs noticeably diminished bilaterally, but without " significant crackles. On cardiovascular exam, the patient has a regular S1 and S2.  Systolic murmur without change.  Abdomen is soft and non-tender. Extremities reveal no clubbing, cyanosis, or significant edema.         Imaging       CT scan of chest 15 July 2021:  5. No PE.   6. Bronchial wall thickening can be seen with bronchitis.  Emphysema is present.   7. Suspected left ventricular hypertrophy. There is severe atherosclerotic disease including coronary artery disease.     Chest radiograph 15 July 2021:  1. Hyperinflation with some stable scattered fibrosis most prominently in the lung bases.   2. No acute new findings.     Lab Results     Recent Labs   Lab 07/18/21  0602 07/18/21  0045 07/17/21  1318 07/17/21  0609 07/16/21  0535 07/15/21  1254   WBC  --   --   --   --  6.9 8.1   HGB 12.6*  --   --   --  12.7* 13.6   MCV  --   --   --   --  87 87   PLT  --   --   --   --  227 259   NA  --  137 132*  --  128* 129*   POTASSIUM  --  4.2 4.0 4.6 4.6 4.4   CHLORIDE  --  97* 91*  --  91* 91*   CO2  --  29 29  --  27 29   BUN  --  52* 47*  --  27 15   CR  --  1.64* 1.86*  --  1.71* 1.46*   ANIONGAP  --  11 12  --  10 9   KELSEY  --  8.7 8.9  --  8.6 8.9   GLC  --  136* 141*  --  141* 114   ALBUMIN  --  3.0*  --   --   --   --    PROTTOTAL  --  6.3  --   --   --   --    BILITOTAL  --  0.2  --   --   --   --    ALKPHOS  --  83  --   --   --   --    ALT  --  14  --   --   --   --    AST  --  18  --   --   --   --      Recent Labs   Lab Test 07/15/21  1254 05/17/21  1733 07/09/20  1135   BNP 1,268* 1,262* 270*     No lab results found in last 7 days.    Invalid input(s): LDLCALC  Lab Results   Component Value Date     07/16/2021    CO2 27 07/16/2021    BUN 27 07/16/2021     Lab Results   Component Value Date    WBC 6.9 07/16/2021    HGB 12.7 07/16/2021    HCT 37.4 07/16/2021    MCV 87 07/16/2021     07/16/2021     Lab Results   Component Value Date    CHOL 116 02/09/2021    TRIG 73 02/09/2021    HDL 36  02/09/2021     No results found for: INR  Lab Results   Component Value Date    TROPONINI 0.04 07/15/2021    TROPONINI 0.04 05/18/2021    TROPONINI 0.04 05/17/2021     Lab Results   Component Value Date    TSH 1.40 12/02/2019           Current Inpatient Scheduled Medications   Scheduled Meds:    amLODIPine  10 mg Oral Daily     aspirin  81 mg Oral Daily     atorvastatin  40 mg Oral QPM     cefTRIAXone  1 g Intravenous Q24H     cyanocobalamin  1,000 mcg Oral Daily     doxycycline (VIBRAMYCIN) IV  100 mg Intravenous Q12H     furosemide  40 mg Oral BID     guaiFENesin  1,200 mg Oral BID     ipratropium - albuterol 0.5 mg/2.5 mg/3 mL  3 mL Nebulization 4x daily     methylPREDNISolone  40 mg Intravenous Q8H     mirtazapine  30 mg Oral At Bedtime     rivaroxaban ANTICOAGULANT  15 mg Oral Daily with supper     sotalol  40 mg Oral BID     tamsulosin  0.4 mg Oral Daily     [Held by provider] tiotropium  18 mcg Inhalation BID     Continuous Infusions:    ACE/ARB/ARNI NOT PRESCRIBED              Medications Prior to Admission   Prior to Admission medications    Medication Sig Start Date End Date Taking? Authorizing Provider   albuterol (PROAIR HFA/PROVENTIL HFA/VENTOLIN HFA) 108 (90 Base) MCG/ACT inhaler Inhale 2 puffs into the lungs every 4 hours as needed  5/20/21  Yes Reported, Patient   amLODIPine (NORVASC) 10 MG tablet Take 10 mg by mouth daily   Yes Unknown, Entered By History   aspirin 81 MG EC tablet Take 81 mg by mouth daily   Yes Unknown, Entered By History   atorvastatin (LIPITOR) 40 MG tablet Take 40 mg by mouth every evening  4/6/21  Yes Reported, Patient   cholecalciferol (VITAMIN D3) 10 mcg (400 units) TABS tablet Take 1,000 Units by mouth daily   Yes Reported, Patient   furosemide (LASIX) 40 MG tablet Take 40 mg by mouth daily  12/6/19  Yes Reported, Patient   mirtazapine (REMERON) 30 MG tablet Take 30 mg by mouth At Bedtime  5/17/19  Yes Reported, Patient   Multiple Vitamins-Minerals (ONCOVITE) TABS Take 1  tablet by mouth daily   Yes Reported, Patient   Omega 3-6-9 Fatty Acids (OMEGA 3-6-9 PO) Take 1,000 mg by mouth daily   Yes Reported, Patient   rivaroxaban ANTICOAGULANT (XARELTO) 15 MG TABS tablet Take 15 mg by mouth daily (with dinner)  12/17/19  Yes Reported, Patient   sotalol (BETAPACE) 80 MG tablet Take 80 mg by mouth 2 times daily  7/1/20  Yes Reported, Patient   tamsulosin (FLOMAX) 0.4 MG capsule Take 0.4 mg by mouth daily  6/10/19  Yes Reported, Patient   tiotropium (SPIRIVA HANDIHALER) 18 MCG inhaled capsule Inhale 2 puffs into the lungs daily  6/4/21  Yes Reported, Patient   vitamin B-12 (CYANOCOBALAMIN) 500 MCG tablet Take 1,000 mcg by mouth daily   Yes Reported, Patient   vitamin E 400 units TABS Take 400 Units by mouth daily   Yes Reported, Patient

## 2021-07-18 NOTE — PLAN OF CARE
Problem: Adjustment to Illness COPD (Chronic Obstructive Pulmonary Disease)  Goal: Optimal Chronic Illness Coping  Outcome: Adequate for Discharge     Problem: Functional Ability Impaired COPD (Chronic Obstructive Pulmonary Disease)  Goal: Optimal Level of Functional [place]  Outcome: Adequate for Discharge     Problem: Respiratory Compromise COPD (Chronic Obstructive Pulmonary Disease)  Goal: Effective Oxygenation and Ventilation  Outcome: Adequate for Discharge   Pt remained off Bipap all night

## 2021-07-18 NOTE — DISCHARGE SUMMARY
[facility] MEDICINE  DISCHARGE SUMMARY     Primary Care Physician: Justo Tom  Admission Date: 7/15/2021   Discharge Provider: Mark Lorenzana MD Discharge Date: 7/18/2021   Diet:   Active Diet and Nourishment Order   Procedures     2 Gram Sodium Diet Other - please comment     Diet       Code Status: Full Code   Activity: DCACTIVITY: Activity as tolerated        Condition at Discharge: Stable     REASON FOR PRESENTATION(See Admission Note for Details)   Shortness of breath    PRINCIPAL & ACTIVE DISCHARGE DIAGNOSES     Principal Problem:    Acute and chronic respiratory failure with hypoxia (H)  Active Problems:    Chronic kidney disease, stage III (moderate) (H)    Hypertension    Peripheral vascular disease (H)    Obstructive Sleep Apnea    Hyponatremia    Pulmonary emphysema, unspecified emphysema type (H)    Aortic stenosis, moderate    COPD with acute exacerbation (H)      PENDING LABS     Unresulted Labs Ordered in the Past 30 Days of this Admission     No orders found from 6/15/2021 to 7/16/2021.            PROCEDURES ( this hospitalization only)          RECOMMENDATIONS TO OUTPATIENT PROVIDER FOR F/U VISIT     Follow-up Appointments     Follow-up and recommended labs and tests       Follow up with Dr. Damon , within 3-5 days to evaluate medication   change, blood pressure check and for hospital follow- up. The following   labs/tests are recommended: Basic metabolic panel.           General recheck of respiratory status.  Reevaluate volume status.  Assure heart failure clinic follow-up in place.  Assure pulmonology follow-up in place.    DISPOSITION     Home    SUMMARY OF HOSPITAL COURSE:    HPI: 80-year-old male with a history of chronic diastolic distant heart failure and COPD presented with worsening shortness of breath.  In the ER he is found to be hypoxic and required BiPAP.  He was admitted to the floor.    Acute hypoxic respiratory failure/acute COPD  exacerbation.  Patient was admitted and started on IV steroids, antibiotics, scheduled nebs, and BiPAP.  Patient required BiPAP for approximately 48 hours and then I was able to get off of that.  His oxygen requirements decreased rather quickly and at the time of discharge he was maintaining his O2 sats in the low 90s at room air at rest.  He was requesting discharge from the hospital.  He will discharge to home on oral Omnicef 300 mg twice daily for 7 more days, doxycycline 100 mg p.o. twice daily for 7 more days, prednisone taper, scheduled DuoNebs, and follow-up with his primary within the next several days.  Also recommend follow-up with pulmonology as an outpatient consultation was placed for outpatient visit.  Likely should have formal PFTs when at baseline.  Acute on chronic diastolic congestive heart failure.  Patient was diuresed with IV diuretics with good response.  Cardiology consultation was obtained.  Patient did have a recent echo.  Patient was later transitioned to oral Lasix 40 mg twice daily.  He is going to be discharged on this with plan for outpatient follow-up with his primary within 3 to 5 days and follow-up in heart failure clinic in 2 weeks.  Not on an ARB due to chronic kidney disease.  Atrial flutter.  Sotalol dose was decreased from 80 twice daily to 40 twice daily due to renal function.  Patient is chronically anticoagulated and rate controlled.  Essential hypertension.  Patient's blood pressures were elevated here.  He is on amlodipine and sotalol.  Also furosemide.  Cardiology recommended initiating clonidine twice daily but patient and his daughter wanted to wait and see if his blood pressures improved outside of the hospital as this is happened in the past.  Patient will see his primary within 3 to 5 days and will monitor his blood pressure at home.  He is asymptomatic with his elevated blood pressures.  Hyponatremia.  Resolved with diuresis and steroids.  Moderate aortic stenosis.   Outpatient follow-up.  Chronic kidney disease stage IIIb.  Renal function remained stable despite diuresis.  At discharge creatinine is 1.64 and GFR is 39.  Recommend outpatient follow-up in 3 to 5 days with basic metabolic panel.  Mild to moderate protein calorie malnutrition.  Encouraged increased caloric intake at home.  Discharge Medications with Med changes:     Current Discharge Medication List      START taking these medications    Details   albuterol (PROVENTIL) (2.5 MG/3ML) 0.083% neb solution Take 1 vial (2.5 mg) by nebulization every 2 hours as needed for shortness of breath / dyspnea or wheezing  Qty: 90 mL, Refills: 0    Associated Diagnoses: Chronic obstructive pulmonary disease, unspecified COPD type (H)      cefdinir (OMNICEF) 300 MG capsule Take 1 capsule (300 mg) by mouth 2 times daily for 7 days  Qty: 14 capsule, Refills: 0    Associated Diagnoses: Chronic obstructive pulmonary disease, unspecified COPD type (H)      doxycycline monohydrate (ADOXA) 100 MG tablet Take 1 tablet (100 mg) by mouth 2 times daily for 7 days  Qty: 14 tablet, Refills: 0    Associated Diagnoses: Chronic obstructive pulmonary disease, unspecified COPD type (H)      guaiFENesin (MUCINEX) 600 MG 12 hr tablet Take 2 tablets (1,200 mg) by mouth 2 times daily  Qty: 20 tablet, Refills: 0    Associated Diagnoses: Chronic obstructive pulmonary disease, unspecified COPD type (H)      ipratropium - albuterol 0.5 mg/2.5 mg/3 mL (DUONEB) 0.5-2.5 (3) MG/3ML neb solution 1 neb 4 times daily for 3 to 5 days and then 4 times daily as needed shortness of breath  Qty: 120 mL, Refills: 1    Associated Diagnoses: Chronic obstructive pulmonary disease, unspecified COPD type (H)      predniSONE (DELTASONE) 10 MG tablet 4 tablets oral daily for 5 days, then 2 tablets daily for 3 days, then 1 tablet daily for 3 days, then DC  Qty: 29 tablet, Refills: 0    Associated Diagnoses: Chronic obstructive pulmonary disease, unspecified COPD type (H)          CONTINUE these medications which have CHANGED    Details   furosemide (LASIX) 40 MG tablet Take 1 tablet (40 mg) by mouth 2 times daily  Qty: 60 tablet, Refills: 0    Associated Diagnoses: Acute on chronic diastolic congestive heart failure (H)      sotalol (BETAPACE) 80 MG tablet Take 0.5 tablets (40 mg) by mouth 2 times daily    Associated Diagnoses: Persistent atrial fibrillation (H)         CONTINUE these medications which have NOT CHANGED    Details   albuterol (PROAIR HFA/PROVENTIL HFA/VENTOLIN HFA) 108 (90 Base) MCG/ACT inhaler Inhale 2 puffs into the lungs every 4 hours as needed       amLODIPine (NORVASC) 10 MG tablet Take 10 mg by mouth daily      aspirin 81 MG EC tablet Take 81 mg by mouth daily      atorvastatin (LIPITOR) 40 MG tablet Take 40 mg by mouth every evening       cholecalciferol (VITAMIN D3) 10 mcg (400 units) TABS tablet Take 1,000 Units by mouth daily      mirtazapine (REMERON) 30 MG tablet Take 30 mg by mouth At Bedtime       Multiple Vitamins-Minerals (ONCOVITE) TABS Take 1 tablet by mouth daily      Omega 3-6-9 Fatty Acids (OMEGA 3-6-9 PO) Take 1,000 mg by mouth daily      rivaroxaban ANTICOAGULANT (XARELTO) 15 MG TABS tablet Take 15 mg by mouth daily (with dinner)       tamsulosin (FLOMAX) 0.4 MG capsule Take 0.4 mg by mouth daily       tiotropium (SPIRIVA HANDIHALER) 18 MCG inhaled capsule Inhale 2 puffs into the lungs daily       vitamin B-12 (CYANOCOBALAMIN) 500 MCG tablet Take 1,000 mcg by mouth daily      vitamin E 400 units TABS Take 400 Units by mouth daily                   Rationale for medication changes:      Antibiotics, steroids and nebulizers for COPD.  Diuretic increased for CHF.        Consults       CORE CLINIC EVALUATION IP CONSULT  NUTRITION SERVICES ADULT IP CONSULT  CARE MANAGEMENT / SOCIAL WORK IP CONSULT  CARDIOLOGY IP CONSULT  PHYSICAL THERAPY ADULT IP CONSULT    Immunizations given this encounter     Most Recent Immunizations   Administered Date(s) Administered      COVID-19,PF,Pfizer 03/03/2021     DT (PEDS <7y) 09/21/2005     Flu, Unspecified 11/14/2011     Influenza (High Dose) 3 valent vaccine 09/26/2019     Influenza Vaccine, 6+MO IM (QUADRIVALENT W/PRESERVATIVES) 01/14/2013     Pneumo Conj 13-V (2010&after) 04/06/2017     Pneumococcal 23 valent 03/03/2009     Td,adult,historic,unspecified 09/21/2005     Tdap (Adacel,Boostrix) 09/28/2013     Zoster vaccine, live 12/08/2011           Anticoagulation Information      Recent INR results: No results for input(s): INR in the last 168 hours.  Warfarin doses (if applicable) or name of other anticoagulant: Rectal oral anticoagulation      SIGNIFICANT IMAGING FINDINGS     Results for orders placed or performed during the hospital encounter of 07/15/21   XR Chest Port 1 View    Impression    IMPRESSION: Hyperinflation with some stable scattered fibrosis most prominently in the lung bases.    No acute new findings.   CT Chest Pulmonary Embolism w Contrast    Impression    IMPRESSION:  1.  No PE.  2.  Bronchial wall thickening can be seen with bronchitis.  Emphysema is present.  3.  Suspected left ventricular hypertrophy. There is severe atherosclerotic disease including coronary artery disease.       SIGNIFICANT LABORATORY FINDINGS     Most Recent 3 CBC's:Recent Labs   Lab Test 07/18/21  0602 07/16/21  0535 07/15/21  1254 05/18/21  0447   WBC  --  6.9 8.1 5.9   HGB 12.6* 12.7* 13.6 11.4*   MCV  --  87 87 88   PLT  --  227 259 257     Most Recent 3 BMP's:Recent Labs   Lab Test 07/18/21  0045 07/17/21  1318 07/17/21  0609 07/16/21  0535    132*  --  128*   POTASSIUM 4.2 4.0 4.6 4.6   CHLORIDE 97* 91*  --  91*   CO2 29 29  --  27   BUN 52* 47*  --  27   CR 1.64* 1.86*  --  1.71*   ANIONGAP 11 12  --  10   KELSEY 8.7 8.9  --  8.6   * 141*  --  141*           Discharge Orders        Nebulizer with Compressor    Nebs daily as prescribed     Follow-Up with Cardiac Advanced Practice Provider      Adult Pulmonary Medicine  Referral      Activity    Your activity upon discharge: activity as tolerated     Monitor and record    Weigh yourself every morning     When to contact your care team    Contact your care team If symptoms get worse, If increased shortness of breath, If waking up at night with difficulty breathing, If unable to lie down for sleep due to symptoms, If weight gain of 2 pounds a day for 2 days in a row OR 5 pounds in 1 week. and Increased swelling in your ankles or legs     Discharge Follow Up - with Primary Care clinic within 3-5 days (RN to schedule prior to d/c for HE/[facility] primary care     Reason for your hospital stay    Acute COPD exacerbation with volume overload/acute congestive heart failure     Activity    Your activity upon discharge: activity as tolerated     Follow-up and recommended labs and tests     Follow up with Dr. Damon , within 3-5 days to evaluate medication change, blood pressure check and for hospital follow- up. The following labs/tests are recommended: Basic metabolic panel.     Diet    Follow this diet upon discharge: Orders Placed This Encounter      2 Gram Sodium Diet       Examination   Physical Exam   Temp:  [97.5  F (36.4  C)-98.6  F (37  C)] 97.7  F (36.5  C)  Pulse:  [63-77] 75  Resp:  [18-20] 18  BP: (161-195)/() 172/72  FiO2 (%):  [92 %] 92 %  SpO2:  [85 %-96 %] 90 %  Wt Readings from Last 1 Encounters:   07/18/21 58.6 kg (129 lb 3.2 oz)       General Appearance: No apparent distress,  Respiratory: Lungs are clear with good respiratory effort but distant breath sounds  Cardiovascular: Rhythm normal S1-S2  GI: Abdomen is soft and nondistended positive bowel sounds no organomegaly  Skin: Visualized skin is normal  Other: Psych with good eye contact normal affect      Please see EMR for more detailed significant labs, imaging, consultant notes etc.    Mark HUERTA MD, personally saw the patient today and spent greater than 30 minutes discharging this patient.    Mark BRADFORD  MD [place]  [facility]    CC:Justo Tom

## 2021-07-18 NOTE — PLAN OF CARE
Patient weaned off oxygen and tolerating ambulation without oxygen. Patient given and explained heart failure packet and discharge AVS. Patient verbalized understanding.     Gloria Stauffer RN   7/18/2021

## 2021-07-18 NOTE — DISCHARGE INSTRUCTIONS
Follow up at [facility] Friday 23rd at 2pm with Nurse practitioner Ashlie Nevarez. Arrive at 1:45pm

## 2021-07-18 NOTE — PLAN OF CARE
Problem: Adult Inpatient Plan of Care  Goal: Plan of Care Review  Outcome: Improving   Lung sounds remain course. Encouraged to cough and deep breath. Pt unable to cough secretions. Notified MD. Tessalon pearls available if needed.   Remains on 1L nasal cannula overnight. NSR on tele with PVCs. Denies pain.    /100. MD notified. PRN hydralazine administered.

## 2021-07-18 NOTE — PLAN OF CARE
Problem: Adjustment to Illness COPD (Chronic Obstructive Pulmonary Disease)  Goal: Optimal Chronic Illness Coping  Outcome: Improving     Problem: Adult Inpatient Plan of Care  Goal: Absence of Hospital-Acquired Illness or Injury  Intervention: Identify and Manage Fall Risk  Recent Flowsheet Documentation  Taken 7/17/2021 1944 by Deirdre Herrera, RN  Safety Promotion/Fall Prevention: clutter free environment maintained     Pt on 1L NC, with O2 saturations in low 90's. Attempted to take pt off of O2 and O2 saturations decreased to 85% within 5 minutes.

## 2021-07-18 NOTE — PROGRESS NOTES
07/18/21 0830   Quick Adds   Type of Visit Initial PT Evaluation   Living Environment   People in home spouse   Current Living Arrangements house   Home Accessibility stairs within home   Number of Stairs, Within Home, Primary 10   Stair Railings, Within Home, Primary railing on left side (ascending)   Transportation Anticipated family or friend will provide   Self-Care   Usual Activity Tolerance moderate   Equipment Currently Used at Home none   Activity/Exercise/Self-Care Comment Not on supplemental O2 at home   General Information   Patient/Family Therapy Goals Statement (PT) Return to home   Pertinent History of Current Problem (include personal factors and/or comorbidities that impact the POC) COPD exacerbation   General Observations Patient currently not on O2 sitting in bed.   Cognition   Orientation Status (Cognition) oriented x 3   Affect/Mental Status (Cognition) WNL   Transfers   Transfers No deficits identified   Gait/Stairs (Locomotion)   Emmons Level (Gait) modified independence;independent   Assistive Device (Gait) walker, front-wheeled   Distance in Feet (Required for LE Total Joints) 100x3   Pattern (Gait) step-through   Deviations/Abnormal Patterns (Gait) michelle decreased;gait speed decreased   Negotiation (Stairs) stairs independence;number of steps   Emmons Level (Stairs) supervision   Number of Steps (Stairs) 10   Comment (Gait/Stairs) Gait and stairs with 2L nasal canula supplemental O2   Balance   Balance no deficits were identified   Clinical Impression   Criteria for Skilled Therapeutic Intervention yes, treatment indicated   PT Diagnosis (PT) decreased endurance   Influenced by the following impairments COPD exacerbation   Functional limitations due to impairments shortness of breath   Clinical Presentation Evolving/Changing   Therapy Frequency (PT) One time eval and treatment only   Planned Therapy Interventions (PT) stair training;gait training   Risk & Benefits of therapy  have been explained patient   Clinical Impression Comments Patient achieved therapy goals today but with 2L supplemental O2   PT Discharge Planning    PT Discharge Recommendation (DC Rec) home   PT Rationale for DC Rec Patient able to demonstrate mobility needed for return to home. However, goals achieved with 2L supplemental O2; RN informed of recommendation for respiratory assessment for possible home supplemental O2.   Total Evaluation Time   Total Evaluation Time (Minutes) 20

## 2021-07-18 NOTE — PROVIDER NOTIFICATION
07/17/21 2038   Tech Time   $Tech Time (10 minute increments) 2   Vital Signs   Pulse 73   Oxygen Therapy   SpO2 94 %   O2 Device Nasal cannula   Oxygen Delivery 1 LPM   Nebulizer Pre Assessment   $RT Use ONLY Delivery Method Nebulizer (Statistical)   Nebulizer Device Mouthpiece   $Demo/Eval Nebulizer/Inhaler - RT ONLY Completed   Pretreatment Heart Rate (beats/min) 67   Pretreatment Resp Rate (breaths/min) 18   Pretreatment O2 sats - (TCU only) 94   Pretreat Breath Sounds - Bilat - All Lobes diminished;wheezes, expiratory;wheezes, inspiratory  (faint exp wheeze)   Breath Sounds Post-Respiratory Treatment   Posttreatment Heart Rate (beats/min) 72   Posttreatment Resp Rate (breaths/min) 18   Post treatment O2 Sats - (TCU only) 92   Breath Sounds Posttreatment All Fields aeration increased;diminished   Aerosol Therapy (SVN)   Daily Review of Necessity (SVN) completed   Respiratory Treatment Status (SVN) given   Treatment Route (SVN) mouthpiece utilized   Patient Position HOB elevated   Posttreatment Assessment (SVN) breath sounds improved;patient reports breathing improved   Signs of Intolerance (SVN) none

## 2021-07-19 NOTE — PROGRESS NOTES
Clinic Care Coordination Contact  [facility]: Post-Discharge Note  SITUATION                                                      Admission:    Admission Date: 07/15/21   Reason for Admission: Shortness of breath  Discharge:   Discharge Date: 07/18/21  Discharge Diagnosis: Acute and chronic respiratory failure with hypoxia (H)  Discharge Service: None    BACKGROUND                                                      HPI: 80-year-old male with a history of chronic diastolic distant heart failure and COPD presented with worsening shortness of breath.  In the ER he is found to be hypoxic and required BiPAP.  He was admitted to the floor.    ASSESSMENT      Discharge Assessment  How are you doing now that you are home?: Pretty good, a little stuffy in my nose and head  How are your symptoms? (Red Flag symptoms escalate to triage hotline per guidelines): Unchanged  Do you feel your condition is stable enough to be safe at home until your provider visit?: Yes  Does the patient have their discharge instructions? : Yes  Does the patient have questions regarding their discharge instructions? : No  Were you started on any new medications or were there changes to any of your previous medications? : Yes - Schedule RNCC appt within 48 business hours  Does the patient have all of their medications?: Yes  Do you have questions regarding any of your medications? : No  Do you have all of your needed medical supplies or equipment (DME)?  (i.e. oxygen tank, CPAP, cane, etc.): Yes  Discharge follow-up appointment scheduled within 14 calendar days? : Yes  Discharge Follow Up Appointment Date: 07/23/21  Discharge Follow Up Appointment Scheduled with?: Primary Care Provider    Post-op  Did the patient have surgery or a procedure: No  Fever: No  Chills: No  Eating & Drinking: eating and drinking without complaints/concerns  PO Intake: other (low sodium diet)  Bowel Function: normal  Date of last BM: 07/19/21  Urinary Status:  voiding without complaint/concerns      PLAN                                                      Outpatient Plan:  Follow-up Appointments     Follow-up and recommended labs and tests       Follow up with Dr. Damon , within 3-5 days to evaluate medication   change, blood pressure check and for hospital follow- up. The following   labs/tests are recommended: Basic metabolic panel.           General recheck of respiratory status.  Reevaluate volume status.  Assure heart failure clinic follow-up in place.  Assure pulmonology follow-up in place.       Future Appointments   Date Time Provider Department Center   7/23/2021  2:00 PM Dinah Nevarez CNP [facility]   7/26/2021 10:30 AM Moe Mims MD [facility]   8/6/2021  8:50 AM [facility]   8/20/2021 11:00 AM [facility]         For any urgent concerns, please contact our 24 hour nurse triage line: [phone] ([phone])         FATUMA Braxton  [phone]  [facility]

## 2021-07-23 NOTE — PROGRESS NOTES
Clinic Note    Assessment:     Assessment and Plan:  1. COPD with acute exacerbation (H)/Pulmonary emphysema, unspecified emphysema type (H): Recent inpatient hospitalization. Continues on Cefdinir and Doxycycline, Duonebs. He is not smoking. Will follow up with Pulmonology. Breathing has been stable at home. Will recheck labs.  - CBC with platelets and differential; Future  - Basic metabolic panel  (Ca, Cl, CO2, Creat, Gluc, K, Na, BUN); Future  - Follow up with Pulmonology as scheduled.  -Continue not smoking.     2. Hyponatremia: Sodium on discharge was 137. Will check today.   - Basic metabolic panel  (Ca, Cl, CO2, Creat, Gluc, K, Na, BUN); Future    3. Hypertension: He continues on Norvasc and Sotalol. Sotalol was decreased from 80mg to 40mg due to kidney function. Cardiology recommended that he start clonidine twice daily. He did not want to do this. Discussed he needs to start checking his blood pressures in the AM and PM, record readings. Follow up with Dr. Tom in one month. Goals are less than 135/85.  - Basic metabolic panel  (Ca, Cl, CO2, Creat, Gluc, K, Na, BUN); Future  - Start twice daily blood pressures.  - Goals are less than 135/85.  - Follow up with Negro in 1 month.     5. Chronic heart failure with preserved ejection fraction (H): Hx of this Furosemide was changed from Furosemide 40mg daily to 40mg BID. No edema today on exam. Will recheck kidney function and electrolytes today. Not on an ARB Due to kidney function. He is followed per the heart failure clinic.     6. Paroxysmal atrial fibrillation (H): Continues. On Sotalol and Xarelto. Stable.     7. Moderate aortic valve stenosis: [place] on exam today. Echo was last done 05/17; was stable.     8. History of tobacco use: He stopped smoking on 07/15. Encouraged him to continue not smoking. He will update provider if needing assistance with this.     9. Chronic renal impairment, stage 3b: Creatinine at baseline on discharge was 1.64; EGF of 39.  Will recheck BMP today.        Patient Instructions   Finish out your medications.    Your labs are processing, we will call you with results once they are back.    Start AM and PM blood pressures, record them, bring them with to your follow up with Dr. Tom. Goals are less than 135/85.    Great job stopping smoking, keep this up!    See Pulmonology for follow as discussed.   Patient Education     Treating COPD       Your healthcare provider will prescribe the best treatments for your COPD.     Treatment  Treatments include:    Medicines. Some medicines help ease symptoms. Others control lung inflammation. Always take your medicines as prescribed. Learn the names of your medicines, and how and when to use them. Talk with your provider about other conditions you have and the medicines you take. When using a metered dose inhaler or nebulizer, use the correct techniques. If you have any questions about how to use your medicine delivery system, call your provider or refer to the user manual.    Tests. To monitor risks, your provider may advise a blood or sputum tests, or other lung function tests.  All people with COPD should be screened once for alpha-1 antitrypsin deficiency (AATD).    Oxygen therapy. If your blood contains too little oxygen, you may need oxygen therapy. Ask about long-term oxygen therapy with your provider.    Smoking. If you smoke, quit. Smoking is the main cause of COPD. Quitting will help you be able to better manage your COPD. Also don't use e-cigarettes or vaping products. Ask your provider to help you quit.    Preventing infections. Infections such as a cold or the flu can worsen your symptoms. Try to stay away from sick people. Wash your hands often. And ask your provider about vaccines for the flu and pneumonia.    Surgery. In a few cases, surgery may be needed.   Coping with shortness of breath  Coping tips include:    Exercise. Be as active as you can. This will help your energy and  strengthen your muscles so you can do more.    Breathing methods. Ask your provider or nurse to show you how to do pursed-lip breathing.    Pollution. Stay away from both indoor and outdoor pollution. Indoor pollution includes things like burning wood, smoke from home cooking, and heating fuels. Outdoor pollution includes things like dusts, vapors, fumes, gases, and other chemicals.    Balance rest and activity. Balance rest with activity. For example, you might start the day with getting dressed and eating breakfast. Then you can relax and read the paper. After that, take a brief walk. And then sit with your feet up for a while.    Pulmonary rehab.  Community and home-based programs work as well as hospital-based programs as long as they are done as often and as intensely. These programs help with shortness of breath in people with COPD. Supervised, traditional pulmonary rehab is the best option for people with COPD. These programs help manage your disease, breathing methods, exercise, support, and counseling. To find one, ask your provider or call your local hospital. Also talk with your provider about which program is best for you.    Healthy eating. Eating a healthy, balanced diet is smith to staying as healthy as possible. So is staying at your ideal weight. Being over- or underweight can affect your health. Make sure you have a lot of fruits and vegetables every day. And also eat  whole grains, lean meats and fish, and low-fat dairy products.  [employer] last reviewed this educational content on 8/1/2018 2000-2021 [employer]. All rights reserved. This information is not intended as a substitute for professional medical care. Always follow your healthcare professional's instructions.             Return in about 1 month (around 8/23/2021) for Follow up with Dr. Tom. .         Subjective:      Richie Gordillo is a 80 year old male presents today for post hospital discharge follow up.    He is a  patient of Dr. Tom.    He was inpatient at [facility] from 07/15-07/18/21 for an Acute COPD exacerbation with acute on chronic respiratory failure with hypoxia. He started on IV antibiotics, steroids, scheduled nebulizers, and BiPAP.  Patient required BiPAP for approximately 48 hours, was then titrated off of oxygen.  He was discharged home with 7-day supply of Omnicef, doxycycline, prednisone taper, and DuoNebs.  He was referred to see pulmonology with PFTs.    Patient also has a history of acute on chronic diastolic congestive heart failure.  He responded well to IV diuretics while inpatient.  He was transition to oral furosemide 40 mg twice daily, labs are rechecked today.  He also has a history of chronic kidney disease, stage IIIb.  He is not on an ARB due to this.  Creatinine at baseline on discharge was 1.64, GFR of 39.    He has a history also of atrial flutter, sotalol was decreased from 80 to 40 mg daily due to renal function.  He is on chronic anticoagulation.    Blood pressures were elevated while inpatient.  He continues on amlodipine and sotalol.  As well as furosemide.  Cardiology did recommend initiating clonidine twice daily, with daughter and the patient wanted to see if his blood pressure would stabilize at home first.  He is not currently taking his blood pressure at home.  Discussed he should start this twice daily, record results, and follow-up with Dr. Ingram in about a month.    He also had some hyponatremia which was resolved with diuresis and steroids.    The patient reports overall feeling well since discharge. He reports his breathing has been stable. He has two days of antibiotics left to finish. He denies a fever, chills, shortness of breath, chest pain, or chest pressure.    He will see Pulmonology in the next week. He will also follow up with the heart failure clinic.    He stopped smoking on admission on 07/15. He has not had a cigarette since. Discussed if he starts craving them  "to ask for help with this.     The following portions of the patient's history were reviewed and updated as appropriate.    Review of Systems:    Review is otherwise negative except for what is mentioned above.     Social Hx:    History   Smoking Status     Former Smoker     Packs/day: 0.25     Types: Cigarettes     Quit date: 7/15/2021   Smokeless Tobacco     Never Used     Comment: Quit 12/2/19 and restarted 7/22/20, and quit again on 1/23/21, started again until quitting for 1 week in 4/2021, and started again on 4/18/21         Objective:     Vitals:    07/23/21 1403   BP: (!) 148/72   BP Location: Left arm   Patient Position: Sitting   Cuff Size: Adult Regular   Pulse: 80   SpO2: 92%   Weight: 59.8 kg (131 lb 14.4 oz)   Height: 1.727 m (5' 8\")       Exam:  General: No apparent distress. Calm. Alert and Oriented X3. Pt behavior is appropriate.  Head:Atraumatic. Normocephalic.  Lungs: Bilateral lower lobe faint crackles, clear upper lobes, normal respiratory effort and rate.   Heart: Regular rate and rhythm, holosystolic murmur. Capillary refill <2 seconds. No edema.   Musculoskeletal: Walks without difficulty.   Neurologic: Interactive, alert, no focal findings.  Skin: Warm, dry.Normal skin turgor.       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     Hyponatremia     Depression     Pulmonary emphysema, unspecified emphysema type (H)     History of colonic polyps     History of stroke     Encounter for therapeutic drug monitoring     Carotid artery stenosis, asymptomatic, bilateral     Aortic stenosis, moderate     Persistent atrial fibrillation " (H)     Chronic heart failure with preserved ejection fraction (H)     Easy bruising     Chronic obstructive pulmonary disease, unspecified COPD type (H)     COPD with acute exacerbation (H)     Acute and chronic respiratory failure with hypoxia (H)     Current Outpatient Medications   Medication Sig Dispense Refill     albuterol (PROAIR HFA/PROVENTIL HFA/VENTOLIN HFA) 108 (90 Base) MCG/ACT inhaler Inhale 2 puffs into the lungs every 4 hours as needed        albuterol (PROVENTIL) (2.5 MG/3ML) 0.083% neb solution Take 1 vial (2.5 mg) by nebulization every 2 hours as needed for shortness of breath / dyspnea or wheezing 90 mL 0     amLODIPine (NORVASC) 10 MG tablet Take 10 mg by mouth daily       aspirin 81 MG EC tablet Take 81 mg by mouth daily       atorvastatin (LIPITOR) 40 MG tablet Take 40 mg by mouth every evening        cefdinir (OMNICEF) 300 MG capsule Take 1 capsule (300 mg) by mouth 2 times daily for 7 days 14 capsule 0     cholecalciferol (VITAMIN D3) 10 mcg (400 units) TABS tablet Take 1,000 Units by mouth daily       doxycycline monohydrate (ADOXA) 100 MG tablet Take 1 tablet (100 mg) by mouth 2 times daily for 7 days 14 tablet 0     furosemide (LASIX) 40 MG tablet Take 1 tablet (40 mg) by mouth 2 times daily 60 tablet 0     ipratropium - albuterol 0.5 mg/2.5 mg/3 mL (DUONEB) 0.5-2.5 (3) MG/3ML neb solution 1 neb 4 times daily for 3 to 5 days and then 4 times daily as needed shortness of breath 120 mL 1     mirtazapine (REMERON) 30 MG tablet Take 30 mg by mouth At Bedtime        Multiple Vitamins-Minerals (ONCOVITE) TABS Take 1 tablet by mouth daily       Nebulizers (VIOS AEROSOL DELIVERY SYSTEM) MISC        Omega 3-6-9 Fatty Acids (OMEGA 3-6-9 PO) Take 1,000 mg by mouth daily       predniSONE (DELTASONE) 10 MG tablet 4 tablets oral daily for 5 days, then 2 tablets daily for 3 days, then 1 tablet daily for 3 days, then DC 29 tablet 0     rivaroxaban ANTICOAGULANT (XARELTO) 15 MG TABS tablet Take 15 mg by  mouth daily (with dinner)        sotalol (BETAPACE) 80 MG tablet Take 0.5 tablets (40 mg) by mouth 2 times daily       tamsulosin (FLOMAX) 0.4 MG capsule Take 0.4 mg by mouth daily        tiotropium (SPIRIVA HANDIHALER) 18 MCG inhaled capsule Inhale 2 puffs into the lungs daily        vitamin B-12 (CYANOCOBALAMIN) 500 MCG tablet Take 1,000 mcg by mouth daily       vitamin E 400 units TABS Take 400 Units by mouth daily       guaiFENesin (MUCINEX) 600 MG 12 hr tablet Take 2 tablets (1,200 mg) by mouth 2 times daily (Patient not taking: Reported on 7/23/2021) 20 tablet 0     Dinah Nevarez, Adult-Geriatric Nurse Practitioner  [facility] - Internal Medicine Team     7/23/2021

## 2021-07-23 NOTE — PATIENT INSTRUCTIONS
Finish out your medications.    Your labs are processing, we will call you with results once they are back.    Start AM and PM blood pressures, record them, bring them with to your follow up with Dr. Tom. Goals are less than 135/85.    Great job stopping smoking, keep this up!    See Pulmonology for follow as discussed.   Patient Education     Treating COPD       Your healthcare provider will prescribe the best treatments for your COPD.     Treatment  Treatments include:    Medicines. Some medicines help ease symptoms. Others control lung inflammation. Always take your medicines as prescribed. Learn the names of your medicines, and how and when to use them. Talk with your provider about other conditions you have and the medicines you take. When using a metered dose inhaler or nebulizer, use the correct techniques. If you have any questions about how to use your medicine delivery system, call your provider or refer to the user manual.    Tests. To monitor risks, your provider may advise a blood or sputum tests, or other lung function tests.  All people with COPD should be screened once for alpha-1 antitrypsin deficiency (AATD).    Oxygen therapy. If your blood contains too little oxygen, you may need oxygen therapy. Ask about long-term oxygen therapy with your provider.    Smoking. If you smoke, quit. Smoking is the main cause of COPD. Quitting will help you be able to better manage your COPD. Also don't use e-cigarettes or vaping products. Ask your provider to help you quit.    Preventing infections. Infections such as a cold or the flu can worsen your symptoms. Try to stay away from sick people. Wash your hands often. And ask your provider about vaccines for the flu and pneumonia.    Surgery. In a few cases, surgery may be needed.   Coping with shortness of breath  Coping tips include:    Exercise. Be as active as you can. This will help your energy and strengthen your muscles so you can do more.    Breathing  methods. Ask your provider or nurse to show you how to do pursed-lip breathing.    Pollution. Stay away from both indoor and outdoor pollution. Indoor pollution includes things like burning wood, smoke from home cooking, and heating fuels. Outdoor pollution includes things like dusts, vapors, fumes, gases, and other chemicals.    Balance rest and activity. Balance rest with activity. For example, you might start the day with getting dressed and eating breakfast. Then you can relax and read the paper. After that, take a brief walk. And then sit with your feet up for a while.    Pulmonary rehab.  Community and home-based programs work as well as hospital-based programs as long as they are done as often and as intensely. These programs help with shortness of breath in people with COPD. Supervised, traditional pulmonary rehab is the best option for people with COPD. These programs help manage your disease, breathing methods, exercise, support, and counseling. To find one, ask your provider or call your local hospital. Also talk with your provider about which program is best for you.    Healthy eating. Eating a healthy, balanced diet is smith to staying as healthy as possible. So is staying at your ideal weight. Being over- or underweight can affect your health. Make sure you have a lot of fruits and vegetables every day. And also eat  whole grains, lean meats and fish, and low-fat dairy products.  [employer] last reviewed this educational content on 8/1/2018 2000-2021 [employer]. All rights reserved. This information is not intended as a substitute for professional medical care. Always follow your healthcare professional's instructions.

## 2021-07-26 NOTE — PROGRESS NOTES
Chief Complaints and History of Present Illnesses   Patient presents with     Follow Up     1 year f/u anophthalmos RE     Chief Complaint(s) and History of Present Illness(es)     Follow Up     In right eye.  Associated symptoms include Negative for eye pain.  Pain   was noted as 0/10. Additional comments: 1 year f/u anophthalmos RE              Comments     Patient notes that the pros fits well, is comfortable. Has some   mattering/discharge, denies use of gtts or waqar. Has not seen    recently.    Yumiko Franklin COT July 26, 2021 10:22 AM            s/p evisceration right eye w/ 18mm silicone implant on 10/30/19. Presents today for his annual exam. Is doing well, has no concerns. Denies pain or irritation. Reports he has mild mucoid discharge but feels it is similar in both eyes. Not using any artificial tears or ointment.          Assessment & Plan     Richie Gordillo is a 80 year old male with the following diagnoses:   1. Anophthalmos - Right Eye         - eye socket is healthy, however has persistent central nylon suture pushing through the conjunctiva with very thin overlying conjunctiva. No evidence of erosion or exposure.   - suture trimmed at the slit-lamp with out complication  - see     - RTC 1 year or sooner SCOTT Garber MD  Oculoplastics Fellow    Attending Physician Attestation:  Complete documentation of historical and exam elements from today's encounter can be found in the full encounter summary report (not reduplicated in this progress note).  I personally obtained the chief complaint(s) and history of present illness.  I confirmed and edited as necessary the review of systems, past medical/surgical history, family history, social history, and examination findings as documented by others; and I examined the patient myself.  I personally reviewed the relevant tests, images, and reports as documented above.  I formulated and edited as necessary the assessment and  plan and discussed the findings and management plan with the patient and family.    -Moe Mims MD

## 2021-07-26 NOTE — NURSING NOTE
Chief Complaints and History of Present Illnesses   Patient presents with     Follow Up     1 year f/u anophthalmos RE     Chief Complaint(s) and History of Present Illness(es)     Follow Up     Laterality: right eye    Associated symptoms: Negative for eye pain    Pain scale: 0/10    Comments: 1 year f/u anophthalmos RE              Comments     Patient notes that the pros fits well, is comfortable. Has some mattering/discharge, denies use of gtts or waqar. Has not seen  recently.    Yumiko IZAGUIRRE July 26, 2021 10:22 AM

## 2021-08-06 NOTE — LETTER
8/6/2021    Justo Tom MD  [address] [place] [address]    RE: Richie Gordillo       Dear Colleague,    I had the pleasure of seeing Richie Gordillo in the [facility].      HEART CARE ENCOUNTER CONSULTATON NOTE      [facility]  [phone]      Assessment/Recommendations   Assessment:    1. Atrial fib/flutter . CHADSVASC: 7 (Age-2, HTN, PVD, CVA-2, CHF).  On Xarelto   2. Acute on Chronic congestive heart failure with preserved ejection fraction (LVEF:55%,NYHA late III), recent hospitalization from July 2021 reviewed.   3. Moderate aortic stenosis (DI:0.4, echo 2021 reviewed)  4. Hypertension.    5. Prior CVA  6.  Moderate bilateral carotid artery disease   7. COPD   8. PVD   9. [place] in CKD stage III   10. Mild LVH      Plan:  1.  Continue sotalol at 40 mg twice daily, A. Fib.  12 lead ECG recently (reviewed) demonstrated QTc<500 ms.       2.  Xarelto 15 mg daily for stroke, renal dosed  3.  Continue lasix to 40 mg BID for congestive heart failure (HFpEF).   4.  Continue ASA for history of CVA and carotid artery disease  5.  Continue atorvastatin for carotid artery disease. LDL goal <70. At goal.   6. Check BMP for [place] in CKD and BNP to monitor CHF.     3 months follow-up.        History of Present Illness/Subjective    HPI: Richie Gordillo is a 80 year old male COPD, congestive failure, aortic stenosis, chronic knee disease stage III, A. fib on sotalol who presents to cardiology clinic in follow-up.    Patient was recently hospitalized for congestive heart failure in July 2021.  During his hospitalization he was evaluated by Dr. Rios.  Reviewed Dr. Asher's most recent notes.  He has congestive heart failure with preserved ejection fraction.      Presentation to the hospital he had noted lower extremity edema.  Worsening shortness of breath.  He was diuresed with Lasix.  Discharged on a higher dose of furosemide.  His  "sotalol was reduced given his CKD.  He was recently had a BNP level which did demonstrate worsening kidney function.  He maintained on Lasix at 40 mg twice daily.  We will check basic metabolic panel today.  Also check a BNP level.    ECG: Sinus rhythm with first AV block, QTC:464 ms.     Echocardiogram Results:  1. Normal left ventricular size and systolic performance with a visually estimated ejection fraction of 65%.   2. There is mild concentric increase in left ventricular wall thickness.   3. There is mild to moderate aortic stenosis.   4. Normal right ventricular size and systolic performance.        Physical Examination  Review of Systems   Vitals: BP (!) 140/62 (BP Location: Left arm, Patient Position: Sitting, Cuff Size: Adult Regular)   Pulse 68   Resp 16   Ht 1.727 m (5' 8\")   Wt 61.2 kg (135 lb)   BMI 20.53 kg/m    BMI= Body mass index is 20.53 kg/m .  Wt Readings from Last 3 Encounters:   07/23/21 59.8 kg (131 lb 14.4 oz)   07/18/21 58.6 kg (129 lb 3.2 oz)   06/22/21 62.6 kg (137 lb 14.4 oz)       General Appearance:   no distress, normal body habitus   ENT/Mouth: membranes moist, no oral lesions or bleeding gums.      EYES:  no scleral icterus, normal conjunctivae   Neck: no carotid bruits or thyromegaly   Chest/Lungs:    Coarse lung sounds.  No significant rales.  Poor airflow.  No sternal scar, equal chest wall expansion    Cardiovascular:   Regular. Normal first and second heart sounds with 3/6 mid peaking systolic murmurs, rubs, or gallops; the carotid, radial and posterior tibial pulses are intact, Jugular venous pressure 6-8, mild edema at ankles. bilaterally    Abdomen:  no organomegaly, masses, bruits, or tenderness; bowel sounds are present   Extremities: no cyanosis or clubbing   Skin: no xanthelasma, warm.    Neurologic: normal  bilateral, no tremors     Psychiatric: alert and oriented x3, calm        Please refer above for cardiac ROS details.        Medical History  Surgical " History Family History Social History   Past Medical History:   Diagnosis Date     Acute on chronic diastolic congestive heart failure (H) 5/21/2021     Arthritis      Atrial fibrillation (H)      BPH (benign prostatic hyperplasia)      Central retinal artery occlusion, right eye      Chronic kidney disease     CKD     CRAO (central retinal artery occlusion), right      Essential tremor      History of alcohol abuse     in remission     HTN (hypertension)      Hyperlipidemia      Hyponatremia      Involutional ectropion of right eye      Leg mass, left      Murmur      Other specified disorders resulting from impaired renal function      secondary to bladder outlet obstruction; resolved       Phthisis bulbi of right eye      PVD (peripheral vascular disease) (H)      Sleep apnea      Stroke (H)     TIA     Past Surgical History:   Procedure Laterality Date     CARDIOVERSION  01/30/2020     CATARACT EXTRACTION       CATARACT IOL, RT/LT       DIODE LASER CYCLOPHOTOCOAGULATION OD (RIGHT EYE) Right      EVISCERATION EYE Right 10/30/2019    Procedure: Evisceration Right Eye, Tarsorrhaphy;  Surgeon: Moe Mims MD;  Location: [facility] OR     EXCISE LESION LOWER EXTREMITY Left 7/27/2017    Procedure: EXCISION LEFT POSTERIOR LEG MASS;  Surgeon: Joseluis Fernandez MD;  Location: [facility];  Service:      EXTERNAL EAR SURGERY      mastoid     EYE SURGERY       HC TRANSURETHRAL ELEC-SURG PROSTATECTOM      Description: Transurethral Resection Of Prostate (TURP);  Recorded: 04/27/2009;     ILIAC ARTERY STENT Right     twice, 2009 and 2010     IR ABDOMINAL AORTOGRAM  8/6/2009     IR ABDOMINAL AORTOGRAM  8/25/2010     IR EXTREMITY ANGIOGRAM BILATERAL  8/6/2009     IR MISCELLANEOUS PROCEDURE  8/6/2009     IR MISCELLANEOUS PROCEDURE  8/6/2009     IR MISCELLANEOUS PROCEDURE  8/25/2010     Family History   Problem Relation Age of Onset     Glaucoma No family hx of      Macular Degeneration No family hx of      Brain Cancer  Father          age 76     Pancreatic Cancer Mother          age 52     Cancer Brother         metastatic, unknown primary     Heart Disease Brother         Social History     Socioeconomic History     Marital status:      Spouse name: Not on file     Number of children: Not on file     Years of education: Not on file     Highest education level: Not on file   Occupational History     Not on file   Tobacco Use     Smoking status: Former Smoker     Packs/day: 0.25     Types: Cigarettes     Quit date: 7/15/2021     Years since quittin.0     Smokeless tobacco: Never Used     Tobacco comment: Quit 21 and restarted 22, and quit again on 23, started again until quitting for 1 week in 2021, and started again on 21   Substance and Sexual Activity     Alcohol use: No     Comment: Alcoholic Drinks/day: hx alcohol abuse in remission     Drug use: No     Sexual activity: Yes     Partners: Female   Other Topics Concern     Not on file   Social History Narrative    He is a retired  and last worked at [employer]. He also was a dental technician. He is  and has 3 children and 8 grandchildren.     Social Determinants of Health     Financial Resource Strain:      Difficulty of Paying Living Expenses:    Food Insecurity:      Worried About Running Out of Food in the Last Year:      Ran Out of Food in the Last Year:    Transportation Needs:      Lack of Transportation (Medical):      Lack of Transportation (Non-Medical):    Physical Activity:      Days of Exercise per Week:      Minutes of Exercise per Session:    Stress:      Feeling of Stress :    Social Connections:      Frequency of Communication with Friends and Family:      Frequency of Social Gatherings with Friends and Family:      Attends Anabaptist Services:      Active Member of Clubs or Organizations:      Attends Club or Organization Meetings:      Marital Status:    Intimate Partner Violence:      Fear of  Current or Ex-Partner:      Emotionally Abused:      Physically Abused:      Sexually Abused:            Medications  Allergies   Current Outpatient Medications   Medication Sig Dispense Refill     albuterol (PROAIR HFA/PROVENTIL HFA/VENTOLIN HFA) 108 (90 Base) MCG/ACT inhaler Inhale 2 puffs into the lungs every 4 hours as needed        albuterol (PROVENTIL) (2.5 MG/3ML) 0.083% neb solution Take 1 vial (2.5 mg) by nebulization every 2 hours as needed for shortness of breath / dyspnea or wheezing 90 mL 0     amLODIPine (NORVASC) 10 MG tablet Take 10 mg by mouth daily       aspirin 81 MG EC tablet Take 81 mg by mouth daily       atorvastatin (LIPITOR) 40 MG tablet Take 40 mg by mouth every evening        cholecalciferol (VITAMIN D3) 10 mcg (400 units) TABS tablet Take 1,000 Units by mouth daily       furosemide (LASIX) 40 MG tablet Take 1 tablet (40 mg) by mouth 2 times daily 60 tablet 0     guaiFENesin (MUCINEX) 600 MG 12 hr tablet Take 2 tablets (1,200 mg) by mouth 2 times daily 20 tablet 0     ipratropium - albuterol 0.5 mg/2.5 mg/3 mL (DUONEB) 0.5-2.5 (3) MG/3ML neb solution 1 neb 4 times daily for 3 to 5 days and then 4 times daily as needed shortness of breath 120 mL 1     mirtazapine (REMERON) 30 MG tablet Take 30 mg by mouth At Bedtime        Multiple Vitamins-Minerals (ONCOVITE) TABS Take 1 tablet by mouth daily       Nebulizers (VIOS AEROSOL DELIVERY SYSTEM) MISC        Omega 3-6-9 Fatty Acids (OMEGA 3-6-9 PO) Take 1,000 mg by mouth daily       predniSONE (DELTASONE) 10 MG tablet 4 tablets oral daily for 5 days, then 2 tablets daily for 3 days, then 1 tablet daily for 3 days, then DC 29 tablet 0     rivaroxaban ANTICOAGULANT (XARELTO) 15 MG TABS tablet Take 15 mg by mouth daily (with dinner)        sotalol (BETAPACE) 80 MG tablet Take 0.5 tablets (40 mg) by mouth 2 times daily       tamsulosin (FLOMAX) 0.4 MG capsule Take 0.4 mg by mouth daily        tiotropium (SPIRIVA HANDIHALER) 18 MCG inhaled capsule  Inhale 2 puffs into the lungs daily        vitamin B-12 (CYANOCOBALAMIN) 500 MCG tablet Take 1,000 mcg by mouth daily       vitamin E 400 units TABS Take 400 Units by mouth daily         Allergies   Allergen Reactions     Ofloxacin Hives     Quinolones Itching          Lab Results    Chemistry/lipid CBC Cardiac Enzymes/BNP/TSH/INR   Recent Labs   Lab Test 02/09/21  1039   CHOL 116   HDL 36*   LDL 65   TRIG 73     Recent Labs   Lab Test 02/09/21  1039 08/07/19  1145 05/10/18  0952   LDL 65 46 63     Recent Labs   Lab Test 07/23/21  1430      POTASSIUM 4.1   CHLORIDE 94*   CO2 31      BUN 64*   CR 2.12*   GFRESTIMATED 29*   KELSEY 9.5     Recent Labs   Lab Test 07/23/21  1430 07/18/21  0045 07/17/21  1318   CR 2.12* 1.64* 1.86*     No results for input(s): A1C in the last 82082 hours.       Recent Labs   Lab Test 07/23/21  1430   WBC 17.2*   HGB 13.5   HCT 40.8   MCV 87        Recent Labs   Lab Test 07/23/21  1430 07/18/21  0602 07/16/21  0535   HGB 13.5 12.6* 12.7*    Recent Labs   Lab Test 07/18/21  0045 07/15/21  1254 05/18/21  0447   TROPONINI 0.04 0.04 0.04     Recent Labs   Lab Test 07/18/21  0045 07/15/21  1254 05/17/21  1733   * 1,268* 1,262*     Recent Labs   Lab Test 12/02/19  1727   TSH 1.40     No results for input(s): INR in the last 81188 hours.     Fabian Perez DO                                          Thank you for allowing me to participate in the care of your patient.      Sincerely,     Fabian Perez DO     [facility]  cc:   No referring provider defined for this encounter.

## 2021-08-06 NOTE — PROGRESS NOTES
HEART CARE ENCOUNTER CONSULTATON NOTE      [facility]  [phone]      Assessment/Recommendations   Assessment:    1. Atrial fib/flutter . CHADSVASC: 7 (Age-2, HTN, PVD, CVA-2, CHF).  On Xarelto   2. Acute on Chronic congestive heart failure with preserved ejection fraction (LVEF:55%,NYHA late III), recent hospitalization from July 2021 reviewed.   3. Moderate aortic stenosis (DI:0.4, echo 2021 reviewed)  4. Hypertension.    5. Prior CVA  6.  Moderate bilateral carotid artery disease   7. COPD   8. PVD   9. [place] in CKD stage III   10. Mild LVH      Plan:  1.  Continue sotalol at 40 mg twice daily, A. Fib.  12 lead ECG recently (reviewed) demonstrated QTc<500 ms.       2.  Xarelto 15 mg daily for stroke, renal dosed  3.  Continue lasix to 40 mg BID for congestive heart failure (HFpEF).   4.  Continue ASA for history of CVA and carotid artery disease  5.  Continue atorvastatin for carotid artery disease. LDL goal <70. At goal.   6. Check BMP for [place] in CKD and BNP to monitor CHF.     3 months follow-up.        History of Present Illness/Subjective    HPI: Richie Gordillo is a 80 year old male COPD, congestive failure, aortic stenosis, chronic knee disease stage III, A. fib on sotalol who presents to cardiology clinic in follow-up.    Patient was recently hospitalized for congestive heart failure in July 2021.  During his hospitalization he was evaluated by Dr. Rios.  Reviewed Dr. Asher's most recent notes.  He has congestive heart failure with preserved ejection fraction.      Presentation to the hospital he had noted lower extremity edema.  Worsening shortness of breath.  He was diuresed with Lasix.  Discharged on a higher dose of furosemide.  His sotalol was reduced given his CKD.  He was recently had a BNP level which did demonstrate worsening kidney function.  He maintained on Lasix at 40 mg twice daily.  We will check basic metabolic panel today.  Also check a BNP level.    ECG: Sinus  "rhythm with first AV block, QTC:464 ms.     Echocardiogram Results:  1. Normal left ventricular size and systolic performance with a visually estimated ejection fraction of 65%.   2. There is mild concentric increase in left ventricular wall thickness.   3. There is mild to moderate aortic stenosis.   4. Normal right ventricular size and systolic performance.        Physical Examination  Review of Systems   Vitals: BP (!) 140/62 (BP Location: Left arm, Patient Position: Sitting, Cuff Size: Adult Regular)   Pulse 68   Resp 16   Ht 1.727 m (5' 8\")   Wt 61.2 kg (135 lb)   BMI 20.53 kg/m    BMI= Body mass index is 20.53 kg/m .  Wt Readings from Last 3 Encounters:   07/23/21 59.8 kg (131 lb 14.4 oz)   07/18/21 58.6 kg (129 lb 3.2 oz)   06/22/21 62.6 kg (137 lb 14.4 oz)       General Appearance:   no distress, normal body habitus   ENT/Mouth: membranes moist, no oral lesions or bleeding gums.      EYES:  no scleral icterus, normal conjunctivae   Neck: no carotid bruits or thyromegaly   Chest/Lungs:    Coarse lung sounds.  No significant rales.  Poor airflow.  No sternal scar, equal chest wall expansion    Cardiovascular:   Regular. Normal first and second heart sounds with 3/6 mid peaking systolic murmurs, rubs, or gallops; the carotid, radial and posterior tibial pulses are intact, Jugular venous pressure 6-8, mild edema at ankles. bilaterally    Abdomen:  no organomegaly, masses, bruits, or tenderness; bowel sounds are present   Extremities: no cyanosis or clubbing   Skin: no xanthelasma, warm.    Neurologic: normal  bilateral, no tremors     Psychiatric: alert and oriented x3, calm        Please refer above for cardiac ROS details.        Medical History  Surgical History Family History Social History   Past Medical History:   Diagnosis Date     Acute on chronic diastolic congestive heart failure (H) 5/21/2021     Arthritis      Atrial fibrillation (H)      BPH (benign prostatic hyperplasia)      Central " retinal artery occlusion, right eye      Chronic kidney disease     CKD     CRAO (central retinal artery occlusion), right      Essential tremor      History of alcohol abuse     in remission     HTN (hypertension)      Hyperlipidemia      Hyponatremia      Involutional ectropion of right eye      Leg mass, left      Murmur      Other specified disorders resulting from impaired renal function      secondary to bladder outlet obstruction; resolved       Phthisis bulbi of right eye      PVD (peripheral vascular disease) (H)      Sleep apnea      Stroke (H)     TIA     Past Surgical History:   Procedure Laterality Date     CARDIOVERSION  2020     CATARACT EXTRACTION       CATARACT IOL, RT/LT       DIODE LASER CYCLOPHOTOCOAGULATION OD (RIGHT EYE) Right      EVISCERATION EYE Right 10/30/2019    Procedure: Evisceration Right Eye, Tarsorrhaphy;  Surgeon: Moe Mims MD;  [facility]     EXCISE LESION LOWER EXTREMITY Left 2017    Procedure: EXCISION LEFT POSTERIOR LEG MASS;  Surgeon: Joseluis Fernandez MD;  Location: [facility];  Service:      EXTERNAL EAR SURGERY      mastoid     EYE SURGERY       HC TRANSURETHRAL ELEC-SURG PROSTATECTOM      Description: Transurethral Resection Of Prostate (TURP);  Recorded: 2009;     ILIAC ARTERY STENT Right     twice,  and      IR ABDOMINAL AORTOGRAM  2009     IR ABDOMINAL AORTOGRAM  2010     IR EXTREMITY ANGIOGRAM BILATERAL  2009     IR MISCELLANEOUS PROCEDURE  2009     IR MISCELLANEOUS PROCEDURE  2009     IR MISCELLANEOUS PROCEDURE  2010     Family History   Problem Relation Age of Onset     Glaucoma No family hx of      Macular Degeneration No family hx of      Brain Cancer Father          age 76     Pancreatic Cancer Mother          age 52     Cancer Brother         metastatic, unknown primary     Heart Disease Brother         Social History     Socioeconomic History     Marital status:      Spouse  name: Not on file     Number of children: Not on file     Years of education: Not on file     Highest education level: Not on file   Occupational History     Not on file   Tobacco Use     Smoking status: Former Smoker     Packs/day: 0.25     Types: Cigarettes     Quit date: 7/15/2021     Years since quittin.0     Smokeless tobacco: Never Used     Tobacco comment: Quit 21 and restarted 22, and quit again on 21, started again until quitting for 1 week in 2021, and started again on 21   Substance and Sexual Activity     Alcohol use: No     Comment: Alcoholic Drinks/day: hx alcohol abuse in remission     Drug use: No     Sexual activity: Yes     Partners: Female   Other Topics Concern     Not on file   Social History Narrative    He is a retired  and last worked at [employer]. He also was a dental technician. He is  and has 3 children and 8 grandchildren.     Social Determinants of Health     Financial Resource Strain:      Difficulty of Paying Living Expenses:    Food Insecurity:      Worried About Running Out of Food in the Last Year:      Ran Out of Food in the Last Year:    Transportation Needs:      Lack of Transportation (Medical):      Lack of Transportation (Non-Medical):    Physical Activity:      Days of Exercise per Week:      Minutes of Exercise per Session:    Stress:      Feeling of Stress :    Social Connections:      Frequency of Communication with Friends and Family:      Frequency of Social Gatherings with Friends and Family:      Attends Restorationist Services:      Active Member of Clubs or Organizations:      Attends Club or Organization Meetings:      Marital Status:    Intimate Partner Violence:      Fear of Current or Ex-Partner:      Emotionally Abused:      Physically Abused:      Sexually Abused:            Medications  Allergies   Current Outpatient Medications   Medication Sig Dispense Refill     albuterol (PROAIR HFA/PROVENTIL HFA/VENTOLIN HFA)  108 (90 Base) MCG/ACT inhaler Inhale 2 puffs into the lungs every 4 hours as needed        albuterol (PROVENTIL) (2.5 MG/3ML) 0.083% neb solution Take 1 vial (2.5 mg) by nebulization every 2 hours as needed for shortness of breath / dyspnea or wheezing 90 mL 0     amLODIPine (NORVASC) 10 MG tablet Take 10 mg by mouth daily       aspirin 81 MG EC tablet Take 81 mg by mouth daily       atorvastatin (LIPITOR) 40 MG tablet Take 40 mg by mouth every evening        cholecalciferol (VITAMIN D3) 10 mcg (400 units) TABS tablet Take 1,000 Units by mouth daily       furosemide (LASIX) 40 MG tablet Take 1 tablet (40 mg) by mouth 2 times daily 60 tablet 0     guaiFENesin (MUCINEX) 600 MG 12 hr tablet Take 2 tablets (1,200 mg) by mouth 2 times daily 20 tablet 0     ipratropium - albuterol 0.5 mg/2.5 mg/3 mL (DUONEB) 0.5-2.5 (3) MG/3ML neb solution 1 neb 4 times daily for 3 to 5 days and then 4 times daily as needed shortness of breath 120 mL 1     mirtazapine (REMERON) 30 MG tablet Take 30 mg by mouth At Bedtime        Multiple Vitamins-Minerals (ONCOVITE) TABS Take 1 tablet by mouth daily       Nebulizers (VIOS AEROSOL DELIVERY SYSTEM) MISC        Omega 3-6-9 Fatty Acids (OMEGA 3-6-9 PO) Take 1,000 mg by mouth daily       predniSONE (DELTASONE) 10 MG tablet 4 tablets oral daily for 5 days, then 2 tablets daily for 3 days, then 1 tablet daily for 3 days, then DC 29 tablet 0     rivaroxaban ANTICOAGULANT (XARELTO) 15 MG TABS tablet Take 15 mg by mouth daily (with dinner)        sotalol (BETAPACE) 80 MG tablet Take 0.5 tablets (40 mg) by mouth 2 times daily       tamsulosin (FLOMAX) 0.4 MG capsule Take 0.4 mg by mouth daily        tiotropium (SPIRIVA HANDIHALER) 18 MCG inhaled capsule Inhale 2 puffs into the lungs daily        vitamin B-12 (CYANOCOBALAMIN) 500 MCG tablet Take 1,000 mcg by mouth daily       vitamin E 400 units TABS Take 400 Units by mouth daily         Allergies   Allergen Reactions     Ofloxacin Hives      Quinolones Itching          Lab Results    Chemistry/lipid CBC Cardiac Enzymes/BNP/TSH/INR   Recent Labs   Lab Test 02/09/21  1039   CHOL 116   HDL 36*   LDL 65   TRIG 73     Recent Labs   Lab Test 02/09/21  1039 08/07/19  1145 05/10/18  0952   LDL 65 46 63     Recent Labs   Lab Test 07/23/21  1430      POTASSIUM 4.1   CHLORIDE 94*   CO2 31      BUN 64*   CR 2.12*   GFRESTIMATED 29*   KELSEY 9.5     Recent Labs   Lab Test 07/23/21  1430 07/18/21  0045 07/17/21  1318   CR 2.12* 1.64* 1.86*     No results for input(s): A1C in the last 07251 hours.       Recent Labs   Lab Test 07/23/21  1430   WBC 17.2*   HGB 13.5   HCT 40.8   MCV 87        Recent Labs   Lab Test 07/23/21  1430 07/18/21  0602 07/16/21  0535   HGB 13.5 12.6* 12.7*    Recent Labs   Lab Test 07/18/21  0045 07/15/21  1254 05/18/21  0447   TROPONINI 0.04 0.04 0.04     Recent Labs   Lab Test 07/18/21  0045 07/15/21  1254 05/17/21  1733   * 1,268* 1,262*     Recent Labs   Lab Test 12/02/19  1727   TSH 1.40     No results for input(s): INR in the last 97899 hours.     Fabian Perez DO

## 2021-08-10 NOTE — PROGRESS NOTES
RESPIRATORY CARE NOTE     Patient Name: Richie Gordillo  Today's Date: 8/10/2021     Complete PFT done. Pt performed tests with good effort. Alb neb given. Test results meet ATS criteria. Results scanned into epic. Pt left in no distress.       Marly Cali, RT

## 2021-08-13 NOTE — PATIENT INSTRUCTIONS - HE
Continue current medications, including the 25 mg dose of losartan.    However, if your labs are not improved, I may contact you for further medication changes.    Try to eat a good breakfast in the morning and avoid drinking large amounts of fluid or coffee, as that can lower your sodium.    Reduce smoking in preparation for your upcoming surgery.    I will have you stop the Plavix/clopidogrel for 10 days prior to your eye surgery.  I will plan to have you continue on the aspirin.    We will discuss your colonoscopy in November, after you recover from the eye surgery.    Flu shot today.   no

## 2021-08-17 NOTE — PROGRESS NOTES
LUNG NODULE & INTERVENTIONAL PULMONARY CLINIC  [facility], [facility]     Richie Gordillo MRN# [id number]   Age: 80 year old YOB: 1940       Requesting Physician: Mark Lorenzana MD  [address] [place] [address]       Assessment and Plan:    1. COPD with recent exacerbation. Advance therapy to anoro.  Action plan discussed. Smoking cessation discussed. We will follow up.    2. Tobacco use - recent.  I encouraged him to continue to abstain.    3.dyspnea on exertion.             History:     Richie Gordillo is a 80 year old male with sig h/o for tobacco use who is here for evaluation/followup of copd exacerbation.  He was admitted with an exacerbation in July.  This was not a usual situation for him.  He required bipap and IV treatments.  Fortunately over the next few days his symptoms improved and he did not require oxygen at discharge.  He is feeling better.  No other concerning provocative factors.  He stopped smoking.    He has some shortness of breath with activity that improves with rest.      - My interpretation of the images relevant for this visit includes: CT chest with no PE, some bronchial wall thickening.  - My interpretation of the PFT's relevant for this visit includes: Obstructive pattern            Past Medical History:      Past Medical History:   Diagnosis Date     Acute on chronic diastolic congestive heart failure (H) 5/21/2021     Arthritis      Atrial fibrillation (H)      BPH (benign prostatic hyperplasia)      Central retinal artery occlusion, right eye      Chronic kidney disease     CKD     CRAO (central retinal artery occlusion), right      Essential tremor      History of alcohol abuse     in remission     HTN (hypertension)      Hyperlipidemia      Hyponatremia      Involutional ectropion of right eye      Leg mass, left      Murmur      Other specified disorders resulting from impaired renal function       secondary to bladder outlet obstruction; resolved       Phthisis bulbi of right eye      PVD (peripheral vascular disease) (H)      Sleep apnea      Stroke (H)     TIA           Past Surgical History:      Past Surgical History:   Procedure Laterality Date     CARDIOVERSION  2020     CATARACT EXTRACTION       CATARACT IOL, RT/LT       DIODE LASER CYCLOPHOTOCOAGULATION OD (RIGHT EYE) Right      EVISCERATION EYE Right 10/30/2019    Procedure: Evisceration Right Eye, Tarsorrhaphy;  Surgeon: Moe Mims MD;  [facility]     EXCISE LESION LOWER EXTREMITY Left 2017    Procedure: EXCISION LEFT POSTERIOR LEG MASS;  Surgeon: Joseluis Fernandez MD;  Location: [facility];  Service:      EXTERNAL EAR SURGERY      mastoid     EYE SURGERY       HC TRANSURETHRAL ELEC-SURG PROSTATECTOM      Description: Transurethral Resection Of Prostate (TURP);  Recorded: 2009;     ILIAC ARTERY STENT Right     twice,  and      IR ABDOMINAL AORTOGRAM  2009     IR ABDOMINAL AORTOGRAM  2010     IR EXTREMITY ANGIOGRAM BILATERAL  2009     IR MISCELLANEOUS PROCEDURE  2009     IR MISCELLANEOUS PROCEDURE  2009     IR MISCELLANEOUS PROCEDURE  2010          Social History:     Social History     Tobacco Use     Smoking status: Former Smoker     Packs/day: 0.25     Types: Cigarettes     Quit date: 7/15/2021     Years since quittin.0     Smokeless tobacco: Never Used     Tobacco comment: Quit 21 and restarted 20, and quit again on 21, started again until quitting for 1 week in 2021, and started again on 21, quit 21   Substance Use Topics     Alcohol use: No     Comment: Alcoholic Drinks/day: hx alcohol abuse in remission          Family History:     Family History   Problem Relation Age of Onset     Glaucoma No family hx of      Macular Degeneration No family hx of      Brain Cancer Father          age 76     Pancreatic Cancer Mother          age 52      Cancer Brother         metastatic, unknown primary     Heart Disease Brother            Allergies:      Allergies   Allergen Reactions     Ofloxacin Hives     Quinolones Itching          Medications:     Current Outpatient Medications   Medication Sig     albuterol (PROAIR HFA/PROVENTIL HFA/VENTOLIN HFA) 108 (90 Base) MCG/ACT inhaler Inhale 2 puffs into the lungs every 4 hours as needed      albuterol (PROVENTIL) (2.5 MG/3ML) 0.083% neb solution Take 1 vial (2.5 mg) by nebulization every 2 hours as needed for shortness of breath / dyspnea or wheezing     amLODIPine (NORVASC) 10 MG tablet Take 10 mg by mouth daily     aspirin 81 MG EC tablet Take 81 mg by mouth daily     atorvastatin (LIPITOR) 40 MG tablet Take 40 mg by mouth every evening      cholecalciferol (VITAMIN D3) 10 mcg (400 units) TABS tablet Take 1,000 Units by mouth daily     furosemide (LASIX) 40 MG tablet Take 1 tablet (40 mg) by mouth 2 times daily     guaiFENesin (MUCINEX) 600 MG 12 hr tablet Take 2 tablets (1,200 mg) by mouth 2 times daily     ipratropium - albuterol 0.5 mg/2.5 mg/3 mL (DUONEB) 0.5-2.5 (3) MG/3ML neb solution 1 neb 4 times daily for 3 to 5 days and then 4 times daily as needed shortness of breath     mirtazapine (REMERON) 30 MG tablet Take 30 mg by mouth At Bedtime      Multiple Vitamins-Minerals (ONCOVITE) TABS Take 1 tablet by mouth daily     Nebulizers (VIOS AEROSOL DELIVERY SYSTEM) MISC      Omega 3-6-9 Fatty Acids (OMEGA 3-6-9 PO) Take 1,000 mg by mouth daily     predniSONE (DELTASONE) 10 MG tablet 4 tablets oral daily for 5 days, then 2 tablets daily for 3 days, then 1 tablet daily for 3 days, then DC     rivaroxaban ANTICOAGULANT (XARELTO) 15 MG TABS tablet Take 15 mg by mouth daily (with dinner)      sotalol (BETAPACE) 80 MG tablet Take 0.5 tablets (40 mg) by mouth 2 times daily     tamsulosin (FLOMAX) 0.4 MG capsule Take 0.4 mg by mouth daily      tiotropium (SPIRIVA HANDIHALER) 18 MCG inhaled capsule Inhale 2 puffs into  "the lungs daily      vitamin B-12 (CYANOCOBALAMIN) 500 MCG tablet Take 1,000 mcg by mouth daily     vitamin E 400 units TABS Take 400 Units by mouth daily     No current facility-administered medications for this visit.          Review of Systems:     See HPI         Physical Exam:   /52   Pulse 64   Ht 1.727 m (5' 8\")   Wt 61.7 kg (136 lb)   SpO2 94%   BMI 20.68 kg/m      Constitutional - looks well, in no apparent distress  Eyes - no redness or discharge  Respiratory -breathing appears comfortable. No wheeze or rhonchi.   Cardiac -- Normal rate, rhythm.   Skin - No appreciable discoloration or lesions (very limited exam)  Neurological - No apparent tremors. Speech fluent and articlate  Psychiatric - no signs of delirium or anxiety          Current Laboratory Data:   All laboratory and imaging data reviewed.    No results found for this or any previous visit (from the past 24 hour(s)).                 "

## 2021-08-20 NOTE — PATIENT INSTRUCTIONS
Reduce amlodipine down to 5 mg a day by cutting pill in half.    Make sure your blood pressure stays less than 150/85.  Increase amlodipine back to 10 mg a day if your blood pressure starts running higher.    Continue the furosemide at 40 mg twice a day.    If blood pressure stays less than 110/60 with continued lightheaded dizzy spells, contact me to consider further medication adjustment.    Quit smoking entirely.    Consider return to the sleep clinic to get evaluated for one of the newer CPAP machines that he may tolerate.  Your sleep apnea condition is likely contributing to your heart failure condition that led your hospitalization.    Plan to repeat your colonoscopy in May of next year.    Follow-up with me in clinic in approximately 2 months, nonfasting visit.    Plan to repeat your heart echo in May of next year, or possibly sooner if you are having more heart failure exacerbations.

## 2021-08-20 NOTE — LETTER
" My COPD Action Plan     Name: Richie Gordillo    YOB: 1940   Date: 8/20/2021    My doctor: Justo Tom MD   My clinic: [facility]    [address] [place] [id number] [phone]  My Controller Medicine: { :873347}   Dose: ***     My Rescue Medicine: { :708561}   Dose: ***     My Flare Up Medicine: { :069089}   Dose: ***     My COPD Severity: { :900246}      Use of Oxygen: { :058278::\"Oxygen Not Prescribed \"}     Make sure you've had your pneumonia   vaccines.          GREEN ZONE       Doing well today      Usual level of activity and exercise    Usual amount of cough and mucus    No shortness of breath    Usual level of health (thinking clearly, sleeping well, feel like eating) Actions:      Take daily medicines    Use oxygen as prescribed    Follow regular exercise and diet plan    Avoid cigarette smoke and other irritants that harm the lungs           YELLOW ZONE          Having a bad day or flare up      Short of breath more than usual    A lot more sputum (mucus) than usual    Sputum looks yellow, green, tan, brown or bloody    More coughing or wheezing    Fever or chills    Less energy; trouble completing activities    Trouble thinking or focusing    Using quick relief inhaler or nebulizer more often    Poor sleep; symptoms wake me up    Do not feel like eating Actions:      Get plenty of rest    Take daily medicines    Use quick relief inhaler every *** hours    If you use oxygen, call you doctor to see if you should adjust your oxygen    Do breathing exercises or other things to help you relax    Let a loved one, friend or neighbor know you are feeling worse    Call your care team if you have 2 or more symptoms.  Start taking steroids or antibiotics if directed by your care team           RED ZONE       Need medical care now      Severe shortness of breath (feel you can't breathe)    Fever, chills    Not enough breath to do any " activity    Trouble coughing up mucus, walking or talking    Blood in mucus    Frequent coughing   Rescue medicines are not working    Not able to sleep because of breathing    Feel confused or drowsy    Chest pain    Actions:      Call your health care team.  If you cannot reach your care team, call 911 or go to the emergency room.        Annual Reminders:  Meet with Care Team, Flu Shot every Fall  Pharmacy:    [id number] [place], [place] - [address] AT [facility] [address]  [facility] [place] [place] - [address] AT [address]AL TO [facility]

## 2021-08-20 NOTE — LETTER
"My Heart Failure Action Plan   Name: Richie Gordillo    YOB: 1940   Date: 8/20/2021    My doctor: Justo Tom [address] [address] [phone]  My Diagnosis: {HF STAGES:284834}   My Exercise Goal: 30 minutes daily  .     My Weight Plan:   Wt Readings from Last 2 Encounters:   08/20/21 136 lb (61.7 kg)   08/17/21 136 lb (61.7 kg)     Weigh yourself daily using the same scale. If you gain more than 2 pounds in 24 hours or 5 pounds in a week {HF WEIGHT GOAL:850531}    My Diet Goal: { :834019::\"No added salt\"}    Emergency Room Visits:    Our goal is to improve your quality of life and help you avoid a visit to the emergency room or hospital.  If we work together, we can achieve this goal. But, if you feel you need to call 911 or go to the emergency room, please do so.  If you go to the emergency room, please bring your list of medicines and your daily weight chart with you.       GREEN ZONE     Doing well today    Weight gained is no more than 2 pounds a day or 5 pounds a week.    No swelling in feet, ankles, legs or stomach.    No more swelling than usual.    No more trouble breathing than usual.    No change in my sleep.    No other problems. Actions:    I am doing fine.  I will take my medicine, follow my diet, see my doctor, exercise, and watch for symptoms.           YELLOW ZONE         Having a bad day or flare up    Weight gain of more than 2 pounds in one day or 5 pounds in one week.    New swelling in ankle, leg, knee or thigh.    Bloating in belly, pants feel tighter.    Swelling in hands or face.    Coughing or trouble breathing while walking or talking.    Harder to breathe last night.    Have trouble sleeping, wake up short of breath.    Much more tired than usual.    Not eating.    Pain in my chest or bad leg cramps.    Feel weak or dizzy. Actions:    I need to take action and call my doctor or nurse " today.                 RED ZONE         Need medical care now    Weight gain of 5 pounds overnight.    Chest pain or pressure that does not go away.    Feel less alert.    Wheezing or have trouble breathing when at rest.    Cannot sleep lying down.    Cannot take my water pill.    Pass out or faint. Actions:    I need to call my doctor or nurse now!    Call 911 if I have chest pain or cannot breathe.

## 2021-08-20 NOTE — PROGRESS NOTES
"SUBJECTIVE:   Richie Gordillo is a 80 year old male who presents for Preventive Visit.      Patient has been advised of split billing requirements and indicates understanding: Yes   Are you in the first 12 months of your Medicare coverage?  No    Healthy Habits:     In general, how would you rate your overall health?  Fair    Frequency of exercise:  None    Do you usually eat at least 4 servings of fruit and vegetables a day, include whole grains    & fiber and avoid regularly eating high fat or \"junk\" foods?  No    Taking medications regularly:  Yes    Ability to successfully perform activities of daily living:  No assistance needed    Home Safety:  No safety concerns identified    Hearing Impairment:  No hearing concerns    In the past 6 months, have you been bothered by leaking of urine?  No    In general, how would you rate your overall mental or emotional health?  Excellent      PHQ-2 Total Score: 0    Additional concerns today:  No    Do you feel safe in your environment? Yes    Have you ever done Advance Care Planning? (For example, a Health Directive, POLST, or a discussion with a medical provider or your loved ones about your wishes): Patient wishes to continue full CODE STATUS.      Fall risk  Fallen 2 or more times in the past year?: No  Any fall with injury in the past year?: No  click delete button to remove this line now  Cognitive Screening   1) Repeat 3 items (Leader, Season, Table)    2) Clock draw: ABNORMAL 5 mins off  3) 3 item recall: Recalls 3 objects  Results: NORMAL clock, 1-2 items recalled: COGNITIVE IMPAIRMENT LESS LIKELY    Mini-CogTM Copyright SUZETTE Rodriguez. Licensed by the author for use in [facility]; reprinted with permission (temitope@.[facility]). All rights reserved.      Do you have sleep apnea, excessive snoring or daytime drowsiness?: yes    Reviewed and updated as needed this visit by clinical staff               Richie is a 80-year-old male here for adult wellness visit physical " exam.    His main issue is recurrent hospitalizations for diastolic congestive heart failure, a another hospitalization in July which I reviewed with patient today.  He arrived hypoxic with pulmonary hypertension, needed BiPAP and IV Lasix, but did recover fairly quickly, ruled out for MI.    Last heart echo May 2021 showed mild to moderate aortic stenosis ejection fraction 65%, no PFO.    He does have moderately severe COPD, he still smokes some about 1 to 2 cigarettes a day.    I did review the chest CT scan from July 2021 that showed bronchitis and emphysema and severe coronary calcification but no new nodule.  No pulmonary embolism.    Patient saw cardiology on August 6.  BNP of 3383 was drawn.  He has chronic renal insufficiency with a creatinine of 1.7.    Patient has known obstructive sleep apnea but does not tolerate CPAP and still refuses to consider another trial of CPAP.  Previous alcohol use but has not been drinking recently.    Patient did increase the furosemide from 40 mg once a day to twice a day since the hospitalization in July.    Lower extremity edema has not returned since hospitalization.    He has had lower blood pressures with significant orthostasis.  Last week he had an episode of 92/49 and felt like he was almost going to faint.  He is having some intermittent blood pressures down around 111/61 with some mild lightheadedness.  Most of his blood pressures have been in the 140-150s/60s.    He is on amlodipine 10 mg a day in addition to the twice a day Lasix.    Severe peripheral vascular disease.  No new claudication.  Known carotid artery stenosis 50-69% bilateral on August 2019 ultrasound.  History of right iliac stent.    January 2021 - stress test.    He is on aspirin in addition to his Xarelto.    Paroxysmal atrial fibrillation well controlled without significant exacerbation, now on lower dose sotalol 40 mg twice daily, renally dosed, that was changed in July.    History of cerebellar  infarct noticed on 2015 imaging.  No new neurologic event.  History of right retinal infarct causing blindness, now right eye removed since 2019.    He is urinating well without significant difficulty after TURP, on Flomax.  No hematuria.    Bowels are regular with no blood in stool.  Colonoscopy May 2021 with 11-15 sessile adenoma, 16-20 abdomen and 8 m millimeter adenoma, GI did not clearly state follow-up plan, plan at this time is 1 year follow-up.    Past history of squamous cell cancer but he does not see any new skin lesions and does not want a follow-up with dermatology at this time.    He does see ophthalmology regularly, history of right eye removed.  Reviewed and updated as needed this visit by Provider    Lives independently with wife.  Previously lived on [place]/ [facility]                Social History     Tobacco Use     Smoking status: Former Smoker     Packs/day: 0.25     Types: Cigarettes     Quit date: 7/15/2021     Years since quittin.0     Smokeless tobacco: Never Used     Tobacco comment: Quit 21 and restarted 20, and quit again on 21, started again until quitting for 1 week in 2021, and started again on 21, quit 21   Substance Use Topics     Alcohol use: No     Comment: Alcoholic Drinks/day: hx alcohol abuse in remission     If you drink alcohol do you typically have >3 drinks per day or >7 drinks per week? No    Alcohol Use 2021   Prescreen: >3 drinks/day or >7 drinks/week? Not Applicable   No flowsheet data found.        Current providers sharing in care for this patient include:   Patient Care Team:  Justo Tom MD as PCP - General (Internal Medicine)  Mando Avila MD as Referring Physician (Ophthalmology)  Moe Mims MD as MD (Ophthalmology)  Moe Mims MD as Assigned Surgical Provider  Justo Tom MD as Assigned PCP  Kika Perez APRN CNP as Assigned Heart and Vascular Provider    The following health  "maintenance items are reviewed in Epic and correct as of today:  Health Maintenance Due   Topic Date Due     HF ACTION PLAN  Never done     ANNUAL REVIEW OF HM ORDERS  Never done     ADVANCE CARE PLANNING  Never done     COPD ACTION PLAN  Never done     HEPATITIS C SCREENING  Never done     MEDICARE ANNUAL WELLNESS VISIT  Never done     ZOSTER IMMUNIZATION (2 of 3) 02/02/2012     PHQ-9  08/09/2021     Labs reviewed in EPIC          Review of Systems  Constitutional, HEENT, cardiovascular, pulmonary, GI, , musculoskeletal, neuro, skin, endocrine and psych systems are negative, except as otherwise noted.    OBJECTIVE:   There were no vitals taken for this visit. Estimated body mass index is 20.68 kg/m  as calculated from the following:    Height as of 8/17/21: 1.727 m (5' 8\").    Weight as of 8/17/21: 61.7 kg (136 lb).  Physical Exam  Alert and oriented x3 with good mood and affect.  Clock face drawing is adequate.  Word recall normal.  Mobility exam within normal limits and able to climb up on exam table.  Right eye nonfunctional with replacement in 2019.  Left pupil reactive.  No jaundice.  External ears and nose exam is normal moderate cerumen but not severe.  No cervical or supraclavicular adenopathy or neck mass.  No JVD and murmur radiation to the carotids noted.  No thyromegaly or nodularity.  Decreased breath sounds throughout consistent with COPD but no wheezing or crackles.  No dullness.  Heart is regular with Prilosec systolic murmur right upper sternal border consistent with his aortic stenosis.  No gallop or rub.  Abdomen is thin, nontender.  No hepatosplenomegaly or pulsatile abdominal mass.  He declined  exam.  No ankle edema.  Skin exam did not show any suspicious sclerotic lesions to inspection and palpation.  ASSESSMENT / PLAN:   1. Encounter for wellness examination in adult  Emphasized the importance of maintaining regular activity and walking.    His main health maintenance issue is his " diastolic heart failure and sleep apnea condition.  - REVIEW OF HEALTH MAINTENANCE PROTOCOL ORDERS  - Full Code    No further prostate cancer screening with age.    2. History of colonic polyps  Plan 1 year follow-up colonoscopy given multiple large polyps earlier this year.  Repeat colonoscopy will be due May of next year    3. History of tobacco use  He has cut down to 1 to 2 cigarettes a day.  I have encouraged him to quit entirely.    Chest CT scan from last month reviewed, no new nodularity    4. Pulmonary emphysema, unspecified emphysema type (H)  Severe, but currently stable.  Continue inhalers.  Quit smoking entirely.    His main issue is the diastolic congestive heart failure most likely associated mainly with his obstructive sleep apnea, currently untreated.  I stressed the importance of treating sleep apnea to reduce chance of heart failure exacerbation in the future.  He did not want referral to the sleep clinic at this time.  I did discuss in detail some of the newer CPAP machines which may be better tolerated for him.  I would have him consider referral to sleep clinic in the future if he would reconsider.    5. Aortic valve stenosis, etiology of cardiac valve disease unspecified  Mild to moderate.  Plan repeat echo in May of next year, if not sooner for heart failure exacerbation.    6. Essential hypertension  Blood pressure intermittently low.  On a higher dose of furosemide now twice a day which she will continue given his heart failure history.    But I will have him lower the amlodipine dose as long as blood pressure is controlled, see patient instructions.  - amLODIPine (NORVASC) 10 MG tablet; Take 0.5 tablets (5 mg) by mouth daily    7. Chronic renal insufficiency, stage 3 (moderate)  Discussed with patient today.  Furosemide for edema management.    8. Peripheral vascular disease (H)  No new claudication.  History of right iliac stent.  Medical management with Lipitor and aspirin, in addition  "to Xarelto    9. Hypercholesterolemia  As above.  I did review labs from February 2021 with an LDL of 65 and HDL 34.  He is not fasting today.    10. Paroxysmal atrial fibrillation (H)  Xarelto in addition to aspirin.    Sotalol 40 mg twice daily.  Regular rhythm today.    11. Obstructive sleep apnea syndrome  Patient does not wish referral for CPAP evaluation.  See discussion above.  Takes mirtazapine at night.  Denies further alcohol use.    12. History of squamous cell carcinoma of skin  Patient did not want to return to the dermatologist at this time.  No new skin lesions identified today.    13. Encounter for therapeutic drug monitoring  I did review labs from August 6, 2021 done with his cardiologist.  As noted above.  Hemoglobin 13.5.    Plan to follow-up in 2 months, I will plan to repeat labs at that time.    BPH voiding well post TURP, Flomax.      Patient has been advised of split billing requirements and indicates understanding: No  COUNSELING:  Reviewed preventive health counseling, as reflected in patient instructions       Regular exercise    Estimated body mass index is 20.68 kg/m  as calculated from the following:    Height as of 8/17/21: 1.727 m (5' 8\").    Weight as of 8/17/21: 61.7 kg (136 lb).    Weight management plan Chronically thin patient with emphysema    He reports that he quit smoking about 5 weeks ago. His smoking use included cigarettes. He smoked 0.25 packs per day. He has never used smokeless tobacco.      Appropriate preventive services were discussed with this patient, including applicable screening as appropriate for cardiovascular disease, diabetes, osteopenia/osteoporosis, and glaucoma.  As appropriate for age/gender, discussed screening for colorectal cancer, prostate cancer, breast cancer, and cervical cancer. Checklist reviewing preventive services available has been given to the patient.    Reviewed patients plan of care and provided an AVS. The Basic Care Plan (routine " screening as documented in Health Maintenance) for Richie meets the Care Plan requirement. This Care Plan has been established and reviewed with the Patient.    Counseling Resources:  ATP IV Guidelines  Pooled Cohorts Equation Calculator  Breast Cancer Risk Calculator  Breast Cancer: Medication to Reduce Risk  FRAX Risk Assessment  ICSI Preventive Guidelines  Dietary Guidelines for Americans, 2010  USDA's MyPlate  ASA Prophylaxis  Lung CA Screening    Justo Tom MD  [facility]    Identified Health Risks:

## 2021-10-15 NOTE — TELEPHONE ENCOUNTER
Please determine if a prior authorization is needed for his DuoNeb solution for nebulizer.  He does have significant COPD and would benefit from DuoNeb.

## 2021-10-21 NOTE — PATIENT INSTRUCTIONS
Get a flu shot this fall.    I do recommend 20 minutes of walking or physical activity every day.  You can walk around your house or put on your favorite music and dance or March around your house for 20 minutes.    CPAP attention right away if you have significant increasing shortness of breath or leg swelling.    Follow-up in 6 months for checkup appointment if otherwise stable.    Plan to repeat your colonoscopy next May.

## 2021-10-21 NOTE — PROGRESS NOTES
[facility] Follow Up Note    Richie Gordillo   80 year old male    Date of Visit: 10/21/2021    Chief Complaint   Patient presents with     RECHECK     Imm/Inj     COVID-19 VACCINE     Subjective  Richie is a 80-year-old male here for follow-up on his heart and lung condition and other medical issues.    He has obstructive sleep apnea but does not tolerate CPAP and does not wish referral back to the sleep clinic at this time.  Previous alcohol but he denies significant at this time.    History of diastolic congestive heart failure with hospitalization in July.  He required BiPAP and IV Lasix at that time.  May 2021 heart echo with ejection fraction 65% with moderate aortic stenosis.  BNP in August was 3385.  Creatinine 1.7 with baseline chronic renal insufficiency.    Moderate to severe COPD, still smokes occasionally.    July 2021 chest CT scan negative for pulmonary embolism.  Emphysema noted.  He is on inhalers.  He denies significant exacerbation at this time.  No change in cough or hemoptysis.    Severe coronary calcifications.  No chest pain.  Negative stress test January 2021.    Paroxysmal atrial fibrillation but no symptomatic palpitations.  On sotalol 40 mg twice daily and Xarelto.  Also on aspirin 81 mg a day and Lipitor 40 mg.    No new generalized myalgia complaints.  LDL 65 in February.    Severe peripheral vascular disease with history of right iliac stent but no claudication complaints.    August 2019 carotid ultrasound 50-69% bilateral.  He had a cerebellar infarct noted in 2015 imaging.  History of right retinal infarct, blind in eye removed in 2019.    Urinating adequately after TURP, with Flomax.    Bowels are normal without blood in stool no abdominal pain.  Patient had a number of large polyps, high risk May 2021 with a 1 year follow-up colonoscopy plan.    History of squamous cell cancer but no new skin lesions and does not plan to see dermatology at this time.    PMHx:    Past  Medical History:   Diagnosis Date     Acute on chronic diastolic congestive heart failure (H) 5/21/2021     Arthritis      Atrial fibrillation (H)      BPH (benign prostatic hyperplasia)      Central retinal artery occlusion, right eye      Chronic kidney disease     CKD     CRAO (central retinal artery occlusion), right      Essential tremor      History of alcohol abuse     in remission     HTN (hypertension)      Hyperlipidemia      Hyponatremia      Involutional ectropion of right eye      Leg mass, left      Murmur      Other specified disorders resulting from impaired renal function      secondary to bladder outlet obstruction; resolved       Phthisis bulbi of right eye      PVD (peripheral vascular disease) (H)      Sleep apnea      Stroke (H)     TIA     PSHx:    Past Surgical History:   Procedure Laterality Date     CARDIOVERSION  01/30/2020     CATARACT EXTRACTION       CATARACT IOL, RT/LT       DIODE LASER CYCLOPHOTOCOAGULATION OD (RIGHT EYE) Right      EVISCERATION EYE Right 10/30/2019    Procedure: Evisceration Right Eye, Tarsorrhaphy;  Surgeon: Moe Mims MD;  Location:  OR     EXCISE LESION LOWER EXTREMITY Left 7/27/2017    Procedure: EXCISION LEFT POSTERIOR LEG MASS;  Surgeon: Joseluis Fernandez MD;  Location: [facility];  Service:      EXTERNAL EAR SURGERY      mastoid     EYE SURGERY       HC TRANSURETHRAL ELEC-SURG PROSTATECTOM      Description: Transurethral Resection Of Prostate (TURP);  Recorded: 04/27/2009;     ILIAC ARTERY STENT Right     twice, 2009 and 2010     IR ABDOMINAL AORTOGRAM  8/6/2009     IR ABDOMINAL AORTOGRAM  8/25/2010     IR EXTREMITY ANGIOGRAM BILATERAL  8/6/2009     IR MISCELLANEOUS PROCEDURE  8/6/2009     IR MISCELLANEOUS PROCEDURE  8/6/2009     IR MISCELLANEOUS PROCEDURE  8/25/2010     Immunizations:   Immunization History   Administered Date(s) Administered     COVID-19,PF,Pfizer 02/10/2021, 03/03/2021     DT (PEDS <7y) 09/21/2005     Influenza (High Dose)  3 valent vaccine 02/13/2019, 09/26/2019     Influenza (IIV3) PF 11/14/2011     Influenza Vaccine, 6+MO IM (QUADRIVALENT W/PRESERVATIVES) 01/14/2013     Pneumo Conj 13-V (2010&after) 04/06/2017     Pneumococcal 23 valent 03/03/2009     TD (ADULT, 7+) 09/21/2005     Tdap (Adacel,Boostrix) 09/28/2013     Zoster vaccine, live 12/08/2011       ROS A comprehensive review of systems was performed and was otherwise negative    Medications, allergies, and problem list were reviewed and updated    Exam  BP (!) 148/60 (BP Location: Left arm, Patient Position: Sitting, Cuff Size: Adult Regular)   Pulse 60   Resp 16   Wt 60.9 kg (134 lb 5 oz)   BMI 20.42 kg/m    Thin elderly man.  No jaundice.  Lungs with decreased breath sounds throughout but no crackles or wheezing.  Heart is regular with 3 out of 6 systolic murmur unchanged.  Abdomen is nontender no ankle edema.  Able to climb up on exam table.    Assessment/Plan  1. Chronic diastolic congestive heart failure (H)  Currently compensated.  Associate with his COPD and sleep apnea.  Patient does not wish to return to the sleep clinic to get evaluated for sleep apnea.  He does not tolerate CPAP.    Continue furosemide 40 mg twice daily.    His blood pressure at home has been running 115-130s over 70s for the most part.  Occasional blood pressures up to 140s.  Denies orthostasis.  He still on 5 mg a day of amlodipine in addition to furosemide.    2. Obstructive sleep apnea syndrome  Does not wish to return to sleep clinic to get evaluated for CPAP.  Does not tolerate CPAP.  Remeron in the evening.  Previous alcohol, patient denies currently    3. Pulmonary emphysema, unspecified emphysema type (H)  Stable but severe.  No smoking some cigarettes.  Not oxygen dependent.  On inhalers.    Pfizer booster today.  He was reminded to get a flu shot this fall.    4. History of tobacco use  Patient was counseled to quit smoking entirely    5. Essential hypertension  Borderline high  systolic today but running lower at home.  Continue current amlodipine at this time.  Lasix twice daily.    Chronic renal insufficiency, checking kidney labs today.    6. Coronary artery disease due to calcified coronary lesion  Asymptomatic.  Lipitor 40 mg and aspirin 81 mg in addition to Xarelto    7. Paroxysmal atrial fibrillation (H)  Regular rhythm at this time on sotalol 40 mg twice daily.  Xarelto.  I did discuss bleeding risk with patient and reminded him if he does fall and hit his head to go to emergency room right away    8. Peripheral vascular disease (H)  No leg claudication.  Carotid artery stenosis.  Medical management as above.    9. History of colonic polyps  High risk polyps earlier this year.  Plan repeat colonoscopy in May next year    10. History of squamous cell carcinoma of skin  Patient does not wish to return to dermatology at this time    11. Encounter for therapeutic drug monitoring    - Comprehensive metabolic panel  - CBC with platelets    12. High priority for 2019-nCoV vaccine    - COVID-19,PF,PFIZER      Return in about 6 months (around 4/21/2022) for Follow up.   Patient Instructions   Get a flu shot this fall.    I do recommend 20 minutes of walking or physical activity every day.  You can walk around your house or put on your favorite music and dance or March around your house for 20 minutes.    CPAP attention right away if you have significant increasing shortness of breath or leg swelling.    Follow-up in 6 months for checkup appointment if otherwise stable.    Plan to repeat your colonoscopy next May.    Justo Tom MD, MD        Current Outpatient Medications   Medication Sig Dispense Refill     albuterol (PROAIR HFA/PROVENTIL HFA/VENTOLIN HFA) 108 (90 Base) MCG/ACT inhaler Inhale 2 puffs into the lungs every 4 hours as needed        amLODIPine (NORVASC) 10 MG tablet Take 0.5 tablets (5 mg) by mouth daily       aspirin 81 MG EC tablet Take 81 mg by mouth daily        atorvastatin (LIPITOR) 40 MG tablet Take 40 mg by mouth every evening        cholecalciferol (VITAMIN D3) 10 mcg (400 units) TABS tablet Take 1,000 Units by mouth daily       furosemide (LASIX) 40 MG tablet Take 1 tablet (40 mg) by mouth 2 times daily 60 tablet 0     guaiFENesin (MUCINEX) 600 MG 12 hr tablet Take 2 tablets (1,200 mg) by mouth 2 times daily 20 tablet 0     ipratropium - albuterol 0.5 mg/2.5 mg/3 mL (DUONEB) 0.5-2.5 (3) MG/3ML neb solution Take 1 vial (3 mLs) by nebulization 4 times daily 120 mL 3     mirtazapine (REMERON) 30 MG tablet Take 30 mg by mouth At Bedtime        Multiple Vitamins-Minerals (ONCOVITE) TABS Take 1 tablet by mouth daily       Nebulizers (Voovio aka 3Ditize AEROSOL DELIVERY SYSTEM) MISC        Omega 3-6-9 Fatty Acids (OMEGA 3-6-9 PO) Take 1,000 mg by mouth daily       predniSONE (DELTASONE) 10 MG tablet 4 tablets oral daily for 5 days, then 2 tablets daily for 3 days, then 1 tablet daily for 3 days, then DC 29 tablet 0     rivaroxaban ANTICOAGULANT (XARELTO) 15 MG TABS tablet Take 15 mg by mouth daily (with dinner)        sotalol (BETAPACE) 80 MG tablet Take 0.5 tablets (40 mg) by mouth 2 times daily       tamsulosin (FLOMAX) 0.4 MG capsule Take 0.4 mg by mouth daily        tiotropium (SPIRIVA HANDIHALER) 18 MCG inhaled capsule Inhale 2 puffs into the lungs daily        umeclidinium-vilanterol (ANORO ELLIPTA) 62.5-25 MCG/INH oral inhaler Inhale 1 puff into the lungs daily 1 each 11     vitamin B-12 (CYANOCOBALAMIN) 500 MCG tablet Take 1,000 mcg by mouth daily       vitamin E 400 units TABS Take 400 Units by mouth daily       albuterol (PROVENTIL) (2.5 MG/3ML) 0.083% neb solution Take 1 vial (2.5 mg) by nebulization every 2 hours as needed for shortness of breath / dyspnea or wheezing 90 mL 0     Allergies   Allergen Reactions     Ofloxacin Hives     Quinolones Itching     Social History     Tobacco Use     Smoking status: Former Smoker     Packs/day: 0.25     Types: Cigarettes     Quit date:  7/15/2021     Years since quittin.2     Smokeless tobacco: Never Used     Tobacco comment: Quit 19 and restarted 20, and quit again on 21, started again until quitting for 1 week in 2021, and started again on 21, quit 21   Substance Use Topics     Alcohol use: No     Comment: Alcoholic Drinks/day: hx alcohol abuse in remission     Drug use: No

## 2021-12-02 NOTE — TELEPHONE ENCOUNTER
Reason for Call:  Medication or medication refill: furosemide (LASIX) 40 MG tablet    Do you use a [facility]? NO  Name of the pharmacy and phone number for the current request:  [facility] [address] @ [facility] in [place], [phone]    Name of the medication requested:   furosemide (LASIX) 40 MG tablet 60 tablet 0 7/18/2021  No   Sig - Route: Take 1 tablet (40 mg) by mouth 2 times daily - Oral     Can we leave a detailed message on this number? YES    Phone number patient can be reached at: Home number on file [phone] (home)    Best Time: ANY    Call taken on 12/2/2021 at 10:52 AM by Selam Garcia

## 2021-12-07 ENCOUNTER — TELEPHONE (OUTPATIENT)
Dept: INTERNAL MEDICINE | Facility: CLINIC | Age: 81
End: 2021-12-07
Payer: COMMERCIAL

## 2021-12-07 NOTE — TELEPHONE ENCOUNTER
12-7-21  Reason for Call:  Verbal consent to sign death cert    Detailed comments:  Verbal consent to sign death cert    Phone Number Patient can be reached at: [phone]    Best Time: anytime    Can we leave a detailed message on this number? YES    Call taken on 12/7/2021 at 10:33 AM by Lauren Gonsalez

## 2021-12-07 NOTE — TELEPHONE ENCOUNTER
I called the medical examiner today.  I was given a history that patient woke up yesterday morning and woke up with his wife complaining of chest pain and shortness of breath.  Although wife was getting ready to bring him to hospital, patient was found unresponsive on floor, patient did not recover consciousness.    Patient has known coronary artery disease.  Death consistent with coronary disease, natural causes.  I will sign death certificate.

## 2021-12-14 ENCOUNTER — TELEPHONE (OUTPATIENT)
Dept: OPHTHALMOLOGY | Facility: CLINIC | Age: 81
End: 2021-12-14
Payer: COMMERCIAL

## 2021-12-14 NOTE — TELEPHONE ENCOUNTER
LVM for patient regarding rescheduling appointment due to provider is out of clinic. Provided [facility] and direct number for rescheduling need.

## 2021-12-17 ENCOUNTER — TELEPHONE (OUTPATIENT)
Dept: OPHTHALMOLOGY | Facility: CLINIC | Age: 81
End: 2021-12-17
Payer: COMMERCIAL

## 2021-12-17 NOTE — TELEPHONE ENCOUNTER
LVM for patient regarding cancelled appointment due to provider is out of clinic. Provided [facility] and direct number for rescheduling need. Also sent a Reschedule and then a Cancelled appointment letter.

## 2022-09-14 NOTE — TELEPHONE ENCOUNTER
POD1  A telephone call was made to Richie Gordillo to make sure the post operative course has been uneventful and that all questions were answered. There was no answer and a telephone message was left.    Oliva Martin MD  Oculoplastic Surgery Fellow     [FreeTextEntry1] : elbow pain [de-identified] : Initially had left elbow pain- went to ptx- had some improvement but it persists. No injections. \par Now with few weeks of right elbow pain.\par  at [facility]. \par not pregnant.\par decl flu vacc.\par pfizer #2

## (undated) DEVICE — SUCTION MANIFOLD NEPTUNE 2 SYS 1 PORT 702-025-000

## (undated) DEVICE — ADH LIQUID MASTISOL TOPICAL VIAL 2-3ML 0523-48

## (undated) DEVICE — ESU CORD BIPOLAR GREEN 10-4000

## (undated) DEVICE — ESU GROUND PAD ADULT W/CORD E7507

## (undated) DEVICE — GLOVE PROTEXIS MICRO 7.5  2D73PM75

## (undated) DEVICE — EYE DRSG PAD OVAL

## (undated) DEVICE — SYR 10ML LL W/O NDL

## (undated) DEVICE — NDL 25GA 1.5" 305127

## (undated) DEVICE — PACK MINOR EYE CUSTOM ASC

## (undated) DEVICE — SOL WATER IRRIG 500ML BOTTLE 2F7113

## (undated) DEVICE — SOL NACL 0.9% 10ML VIAL 0409-4888-02

## (undated) DEVICE — ESU NDL COLORADO MICRO 3CM STR N103A

## (undated) DEVICE — TUBING SUCTION 12"X1/4" N612

## (undated) DEVICE — SU ETHILON 5-0 P-3 18" BLACK 698G

## (undated) DEVICE — EYE SHIELD PLASTIC

## (undated) DEVICE — BLADE KNIFE SURG 15 371115

## (undated) DEVICE — SYR 03ML LL W/O NDL 309657

## (undated) DEVICE — DRSG GAUZE 4X4" TRAY 6939

## (undated) DEVICE — SU VICRYL 6-0 P-1 18" UND J489G

## (undated) DEVICE — LINEN TOWEL PACK X5 5464

## (undated) DEVICE — SU VICRYL 5-0 P-3 18" UND J493G

## (undated) DEVICE — PEN MARKING SKIN TYCO DEVON DUAL TIP 31145868

## (undated) DEVICE — TAPE ELASTIKON 2" LATEX

## (undated) DEVICE — SPECIMEN CONTAINER W/10% BUFFERED FORMALIN 120ML 591201

## (undated) RX ORDER — PHENYLEPHRINE HCL IN 0.9% NACL 1 MG/10 ML
SYRINGE (ML) INTRAVENOUS
Status: DISPENSED
Start: 2019-10-30

## (undated) RX ORDER — ACETAMINOPHEN 325 MG/1
TABLET ORAL
Status: DISPENSED
Start: 2019-10-30

## (undated) RX ORDER — FENTANYL CITRATE 50 UG/ML
INJECTION, SOLUTION INTRAMUSCULAR; INTRAVENOUS
Status: DISPENSED
Start: 2019-10-30

## (undated) RX ORDER — PROPOFOL 10 MG/ML
INJECTION, EMULSION INTRAVENOUS
Status: DISPENSED
Start: 2019-10-30

## (undated) RX ORDER — GLYCOPYRROLATE 0.2 MG/ML
INJECTION INTRAMUSCULAR; INTRAVENOUS
Status: DISPENSED
Start: 2019-10-30

## (undated) RX ORDER — CEFAZOLIN SODIUM 1 G/3ML
INJECTION, POWDER, FOR SOLUTION INTRAMUSCULAR; INTRAVENOUS
Status: DISPENSED
Start: 2019-10-30

## (undated) RX ORDER — EPHEDRINE SULFATE 50 MG/ML
INJECTION, SOLUTION INTRAMUSCULAR; INTRAVENOUS; SUBCUTANEOUS
Status: DISPENSED
Start: 2019-10-30

## (undated) RX ORDER — LIDOCAINE HYDROCHLORIDE 20 MG/ML
INJECTION, SOLUTION EPIDURAL; INFILTRATION; INTRACAUDAL; PERINEURAL
Status: DISPENSED
Start: 2019-10-30

## (undated) RX ORDER — DEXAMETHASONE SODIUM PHOSPHATE 4 MG/ML
INJECTION, SOLUTION INTRA-ARTICULAR; INTRALESIONAL; INTRAMUSCULAR; INTRAVENOUS; SOFT TISSUE
Status: DISPENSED
Start: 2019-10-30

## (undated) RX ORDER — VASOPRESSIN 20 U/ML
INJECTION PARENTERAL
Status: DISPENSED
Start: 2019-10-30

## (undated) RX ORDER — ONDANSETRON 2 MG/ML
INJECTION INTRAMUSCULAR; INTRAVENOUS
Status: DISPENSED
Start: 2019-10-30

## (undated) RX ORDER — GENTAMICIN 40 MG/ML
INJECTION, SOLUTION INTRAMUSCULAR; INTRAVENOUS
Status: DISPENSED
Start: 2019-10-30

## (undated) RX ORDER — OXYCODONE HYDROCHLORIDE 5 MG/1
TABLET ORAL
Status: DISPENSED
Start: 2019-10-30